# Patient Record
Sex: FEMALE | Race: WHITE | Employment: OTHER | URBAN - METROPOLITAN AREA
[De-identification: names, ages, dates, MRNs, and addresses within clinical notes are randomized per-mention and may not be internally consistent; named-entity substitution may affect disease eponyms.]

---

## 2018-04-17 ENCOUNTER — HOSPITAL ENCOUNTER (OUTPATIENT)
Dept: GENERAL RADIOLOGY | Age: 73
Discharge: HOME | End: 2018-04-19
Payer: MEDICARE

## 2018-04-17 ENCOUNTER — HOSPITAL ENCOUNTER (OUTPATIENT)
Age: 73
End: 2018-04-17
Payer: MEDICARE

## 2018-04-17 DIAGNOSIS — R06.00 DYSPNEA, UNSPECIFIED TYPE: ICD-10-CM

## 2018-04-17 PROCEDURE — 71046 X-RAY EXAM CHEST 2 VIEWS: CPT

## 2018-11-09 ENCOUNTER — TELEPHONE (OUTPATIENT)
Dept: ADMINISTRATIVE | Age: 73
End: 2018-11-09

## 2019-01-14 ENCOUNTER — OFFICE VISIT (OUTPATIENT)
Dept: CARDIOLOGY CLINIC | Age: 74
End: 2019-01-14
Payer: MEDICARE

## 2019-01-14 VITALS
BODY MASS INDEX: 46.44 KG/M2 | SYSTOLIC BLOOD PRESSURE: 130 MMHG | HEIGHT: 64 IN | RESPIRATION RATE: 20 BRPM | WEIGHT: 272 LBS | DIASTOLIC BLOOD PRESSURE: 90 MMHG | HEART RATE: 82 BPM

## 2019-01-14 DIAGNOSIS — I50.30 (HFPEF) HEART FAILURE WITH PRESERVED EJECTION FRACTION (HCC): ICD-10-CM

## 2019-01-14 DIAGNOSIS — Z82.49 FAMILY HISTORY OF HEART DISEASE: ICD-10-CM

## 2019-01-14 DIAGNOSIS — I10 ESSENTIAL HYPERTENSION: Primary | ICD-10-CM

## 2019-01-14 DIAGNOSIS — I35.0 AORTIC VALVE STENOSIS, ETIOLOGY OF CARDIAC VALVE DISEASE UNSPECIFIED: ICD-10-CM

## 2019-01-14 DIAGNOSIS — E03.9 ACQUIRED HYPOTHYROIDISM: ICD-10-CM

## 2019-01-14 DIAGNOSIS — I35.0 NONRHEUMATIC AORTIC VALVE STENOSIS: ICD-10-CM

## 2019-01-14 DIAGNOSIS — E78.5 HYPERLIPIDEMIA, UNSPECIFIED HYPERLIPIDEMIA TYPE: ICD-10-CM

## 2019-01-14 DIAGNOSIS — Z82.49 FAMILY HISTORY OF CORONARY ARTERY DISEASE: ICD-10-CM

## 2019-01-14 PROCEDURE — 93000 ELECTROCARDIOGRAM COMPLETE: CPT | Performed by: INTERNAL MEDICINE

## 2019-01-14 PROCEDURE — 99214 OFFICE O/P EST MOD 30 MIN: CPT | Performed by: INTERNAL MEDICINE

## 2019-01-14 RX ORDER — MAGNESIUM GLUCONATE 30 MG(550)
TABLET ORAL DAILY
COMMUNITY

## 2019-01-14 RX ORDER — FUROSEMIDE 20 MG/1
20 TABLET ORAL DAILY
Qty: 30 TABLET | Refills: 11 | Status: SHIPPED | OUTPATIENT
Start: 2019-01-14 | End: 2020-01-28

## 2019-01-14 RX ORDER — LOSARTAN POTASSIUM AND HYDROCHLOROTHIAZIDE 25; 100 MG/1; MG/1
1 TABLET ORAL DAILY
Refills: 1 | COMMUNITY
Start: 2018-10-30

## 2019-01-23 ENCOUNTER — HOSPITAL ENCOUNTER (OUTPATIENT)
Dept: CARDIOLOGY | Age: 74
Discharge: HOME | End: 2019-01-23
Payer: MEDICARE

## 2019-01-23 DIAGNOSIS — I50.30 (HFPEF) HEART FAILURE WITH PRESERVED EJECTION FRACTION (HCC): ICD-10-CM

## 2019-01-23 DIAGNOSIS — I35.0 NONRHEUMATIC AORTIC VALVE STENOSIS: ICD-10-CM

## 2019-01-23 LAB
LV EF: 65 %
LVEF MODALITY: NORMAL

## 2019-01-23 PROCEDURE — 93306 TTE W/DOPPLER COMPLETE: CPT | Performed by: PSYCHIATRY & NEUROLOGY

## 2019-01-25 ENCOUNTER — TELEPHONE (OUTPATIENT)
Dept: CARDIOLOGY CLINIC | Age: 74
End: 2019-01-25

## 2019-04-14 ENCOUNTER — HOSPITAL ENCOUNTER (EMERGENCY)
Age: 74
Discharge: HOME | End: 2019-04-14
Attending: EMERGENCY MEDICINE
Payer: MEDICARE

## 2019-04-14 VITALS
TEMPERATURE: 98.4 F | HEIGHT: 64 IN | HEART RATE: 70 BPM | OXYGEN SATURATION: 100 % | DIASTOLIC BLOOD PRESSURE: 63 MMHG | RESPIRATION RATE: 16 BRPM | SYSTOLIC BLOOD PRESSURE: 114 MMHG | WEIGHT: 282 LBS | BODY MASS INDEX: 48.14 KG/M2

## 2019-04-14 DIAGNOSIS — R04.0 EPISTAXIS: Primary | ICD-10-CM

## 2019-04-14 LAB
ANION GAP SERPL CALCULATED.3IONS-SCNC: 7 MMOL/L (ref 7–16)
APTT: 32.3 SEC (ref 24.5–35.1)
BUN BLDV-MCNC: 18 MG/DL (ref 8–23)
CALCIUM SERPL-MCNC: 9.2 MG/DL (ref 8.6–10.2)
CHLORIDE BLD-SCNC: 94 MMOL/L (ref 98–107)
CO2: 37 MMOL/L (ref 22–29)
CREAT SERPL-MCNC: 0.9 MG/DL (ref 0.5–1)
GFR AFRICAN AMERICAN: >60
GFR NON-AFRICAN AMERICAN: >60 ML/MIN/1.73
GLUCOSE BLD-MCNC: 129 MG/DL (ref 74–99)
HCT VFR BLD CALC: 45.1 % (ref 34–48)
HEMOGLOBIN: 13.9 G/DL (ref 11.5–15.5)
INR BLD: 1
MCH RBC QN AUTO: 27.7 PG (ref 26–35)
MCHC RBC AUTO-ENTMCNC: 30.8 % (ref 32–34.5)
MCV RBC AUTO: 90 FL (ref 80–99.9)
PDW BLD-RTO: 15.9 FL (ref 11.5–15)
PLATELET # BLD: 178 E9/L (ref 130–450)
PMV BLD AUTO: 10.6 FL (ref 7–12)
POTASSIUM REFLEX MAGNESIUM: 4.1 MMOL/L (ref 3.5–5)
PROTHROMBIN TIME: 11.6 SEC (ref 9.3–12.4)
RBC # BLD: 5.01 E12/L (ref 3.5–5.5)
SODIUM BLD-SCNC: 138 MMOL/L (ref 132–146)
WBC # BLD: 7.1 E9/L (ref 4.5–11.5)

## 2019-04-14 PROCEDURE — 30901 CONTROL OF NOSEBLEED: CPT

## 2019-04-14 PROCEDURE — 85027 COMPLETE CBC AUTOMATED: CPT

## 2019-04-14 PROCEDURE — 85730 THROMBOPLASTIN TIME PARTIAL: CPT

## 2019-04-14 PROCEDURE — 85610 PROTHROMBIN TIME: CPT

## 2019-04-14 PROCEDURE — 80048 BASIC METABOLIC PNL TOTAL CA: CPT

## 2019-04-14 PROCEDURE — 36415 COLL VENOUS BLD VENIPUNCTURE: CPT

## 2019-04-14 PROCEDURE — 99283 EMERGENCY DEPT VISIT LOW MDM: CPT

## 2019-04-14 ASSESSMENT — ENCOUNTER SYMPTOMS
EYE PAIN: 0
ABDOMINAL PAIN: 0
WHEEZING: 0
EYE REDNESS: 0
SINUS PRESSURE: 0
COUGH: 0
RHINORRHEA: 0
DIARRHEA: 0
SHORTNESS OF BREATH: 0
NAUSEA: 0
COLOR CHANGE: 0
SORE THROAT: 0
VOMITING: 0

## 2019-04-14 NOTE — ED PROVIDER NOTES
headaches. Hematological: Negative for adenopathy. Does not bruise/bleed easily. Physical Exam   Constitutional: She is oriented to person, place, and time. She appears well-developed and well-nourished. No distress. HENT:   Head: Normocephalic and atraumatic. Nose: No nasal septal hematoma. Epistaxis is observed. Mouth/Throat: Oropharynx is clear and moist. No oropharyngeal exudate. Small amount of dried dark red blood with a small clot between the turbinates and septum, no active bleeding; no posterior oropharynx blood   Eyes: Pupils are equal, round, and reactive to light. Conjunctivae and EOM are normal. Right eye exhibits no discharge. Left eye exhibits no discharge. No scleral icterus. Neck: Normal range of motion. Neck supple. No JVD present. No tracheal deviation present. No thyromegaly present. Cardiovascular: Normal rate, regular rhythm and intact distal pulses. Exam reveals no gallop and no friction rub. Murmur (systolic) heard. Pulmonary/Chest: Effort normal and breath sounds normal. No accessory muscle usage. No respiratory distress. She has no wheezes. She has no rhonchi. She has no rales. She exhibits no tenderness and no crepitus. Abdominal: Soft. Bowel sounds are normal. She exhibits no distension. There is no tenderness. There is no rebound and no guarding. Musculoskeletal: Normal range of motion. She exhibits no edema, tenderness or deformity. Neurological: She is alert and oriented to person, place, and time. No cranial nerve deficit. She exhibits normal muscle tone. Coordination normal.   Skin: Skin is warm and dry. No rash noted. She is not diaphoretic. No erythema. No pallor. Nursing note and vitals reviewed.       Procedures  --------------------------------------------------------- PROCEDURE NOTE ---------------------------------------------------------  NASAL  CAUTERY:    Unless otherwise indicated, this procedure was done or directly supervised by the ED attending. Indication:   Right anterior epistaxis. Risks, benefits and alternatives (for applicable procedures below) described. Informed consent obtained: The patient provided verbal consent for this procedure. .    Procedure:  Right nares cauterized with silver nitrate. .    Patient tolerated the procedure well    -----------------------------------------------------------------------------------------------------------------------------------------------------    [facility]    ED Course as of Apr 14 2030   Sun Apr 14, 2019   1215 Scabbed, clotted area of the right septum was cauterized with silver nitrate. No bleeding or bruising afterwards. Patient tolerated this well. She is ready and eager for discharge. [JL]      ED Course User Index  [JL] Dung Kruse DO           --------------------------------------------- PAST HISTORY ---------------------------------------------  Past Medical History:  has a past medical history of Dyspnea on exertion, Family history of heart disease, Hyperlipidemia, Hypertension, and Mild aortic stenosis. Past Surgical History:  has a past surgical history that includes Tubal ligation. Social History:  reports that she quit smoking about 54 years ago. Her smoking use included cigarettes. She has a 0.30 pack-year smoking history. She has never used smokeless tobacco. She reports that she does not drink alcohol. Family History: family history includes Heart Attack in her brother. The patients home medications have been reviewed. Allergies: Patient has no known allergies.     -------------------------------------------------- RESULTS -------------------------------------------------  Labs:  Results for orders placed or performed during the hospital encounter of 04/14/19   Protime-INR   Result Value Ref Range    Protime 11.6 9.3 - 12.4 sec    INR 1.0    APTT   Result Value Ref Range    aPTT 32.3 24.5 - 35.1 sec   CBC   Result Value Ref Range    WBC 7.1 4.5 - 11.5 E9/L RBC 5.01 3.50 - 5.50 E12/L    Hemoglobin 13.9 11.5 - 15.5 g/dL    Hematocrit 45.1 34.0 - 48.0 %    MCV 90.0 80.0 - 99.9 fL    MCH 27.7 26.0 - 35.0 pg    MCHC 30.8 (L) 32.0 - 34.5 %    RDW 15.9 (H) 11.5 - 15.0 fL    Platelets 598 211 - 391 E9/L    MPV 10.6 7.0 - 12.0 fL   Basic Metabolic Panel w/ Reflex to MG   Result Value Ref Range    Sodium 138 132 - 146 mmol/L    Potassium reflex Magnesium 4.1 3.5 - 5.0 mmol/L    Chloride 94 (L) 98 - 107 mmol/L    CO2 37 (H) 22 - 29 mmol/L    Anion Gap 7 7 - 16 mmol/L    Glucose 129 (H) 74 - 99 mg/dL    BUN 18 8 - 23 mg/dL    CREATININE 0.9 0.5 - 1.0 mg/dL    GFR Non-African American >60 >=60 mL/min/1.73    GFR African American >60     Calcium 9.2 8.6 - 10.2 mg/dL       Radiology:  No orders to display       ------------------------- NURSING NOTES AND VITALS REVIEWED ---------------------------  Date / Time Roomed:  4/14/2019 10:35 AM  ED Bed Assignment:  05/05    The nursing notes within the ED encounter and vital signs as below have been reviewed. /63   Pulse 70   Temp 98.4 °F (36.9 °C) (Oral)   Resp 16   Ht 5' 4\" (1.626 m)   Wt 282 lb (127.9 kg)   SpO2 100%   BMI 48.41 kg/m²   Oxygen Saturation Interpretation: Normal      ------------------------------------------ PROGRESS NOTES ------------------------------------------  11:01 AM  I have spoken with the patient and discussed todays results, in addition to providing specific details for the plan of care and counseling regarding the diagnosis and prognosis. Their questions are answered at this time and they are agreeable with the plan. I discussed at length with them reasons for immediate return here for re evaluation.  They will followup with their primary care physician by calling their office on Monday.      --------------------------------- ADDITIONAL PROVIDER NOTES ---------------------------------  At this time the patient is without objective evidence of an acute process requiring hospitalization or inpatient management. They have remained hemodynamically stable throughout their entire ED visit and are stable for discharge with outpatient follow-up. The plan has been discussed in detail and they are aware of the specific conditions for emergent return, as well as the importance of follow-up. Discharge Medication List as of 4/14/2019 12:14 PM          Diagnosis:  1. Epistaxis        Disposition:  Patient's disposition: Discharge to home  Patient's condition is stable.             Memo Galeano, DO  Resident  04/14/19 2030

## 2019-04-14 NOTE — ED NOTES
Bed: 05  Expected date:   Expected time:   Means of arrival:   Comments:  Jocelin Peña RN  04/14/19 4523

## 2019-07-31 ENCOUNTER — OFFICE VISIT (OUTPATIENT)
Dept: CARDIOLOGY CLINIC | Age: 74
End: 2019-07-31
Payer: MEDICARE

## 2019-07-31 VITALS
WEIGHT: 290.3 LBS | SYSTOLIC BLOOD PRESSURE: 118 MMHG | BODY MASS INDEX: 49.56 KG/M2 | HEIGHT: 64 IN | DIASTOLIC BLOOD PRESSURE: 68 MMHG | HEART RATE: 84 BPM | RESPIRATION RATE: 16 BRPM

## 2019-07-31 DIAGNOSIS — I50.32 CHRONIC HEART FAILURE WITH PRESERVED EJECTION FRACTION (HCC): Primary | ICD-10-CM

## 2019-07-31 DIAGNOSIS — Z82.49 FAMILY HISTORY OF HEART DISEASE: ICD-10-CM

## 2019-07-31 DIAGNOSIS — I10 ESSENTIAL HYPERTENSION: ICD-10-CM

## 2019-07-31 DIAGNOSIS — I35.0 NONRHEUMATIC AORTIC VALVE STENOSIS: ICD-10-CM

## 2019-07-31 PROCEDURE — 93000 ELECTROCARDIOGRAM COMPLETE: CPT | Performed by: INTERNAL MEDICINE

## 2019-07-31 PROCEDURE — 99214 OFFICE O/P EST MOD 30 MIN: CPT | Performed by: INTERNAL MEDICINE

## 2019-07-31 NOTE — PROGRESS NOTES
on file    Sexual activity: Not on file   Lifestyle    Physical activity:     Days per week: Not on file     Minutes per session: Not on file    Stress: Not on file   Relationships    Social connections:     Talks on phone: Not on file     Gets together: Not on file     Attends [employer] service: Not on file     Active member of club or organization: Not on file     Attends meetings of clubs or organizations: Not on file     Relationship status: Not on file    Intimate partner violence:     Fear of current or ex partner: Not on file     Emotionally abused: Not on file     Physically abused: Not on file     Forced sexual activity: Not on file   Other Topics Concern    Not on file   Social History Narrative    Not on file       Allergies:  No Known Allergies    Current Medications:  Current Outpatient Medications   Medication Sig Dispense Refill    losartan-hydrochlorothiazide (HYZAAR) 100-25 MG per tablet Take 1 tablet by mouth daily   1    Cholecalciferol (VITAMIN D3) 5000 units TABS Take by mouth daily      Potassium Gluconate 595 (99 K) MG TABS Take by mouth daily      furosemide (LASIX) 20 MG tablet Take 1 tablet by mouth daily 30 tablet 11    montelukast (SINGULAIR) 10 MG tablet Take 10 mg by mouth nightly.  Multiple Vitamins-Minerals (VISION VITAMINS PO) Take  by mouth daily.  Flaxseed, Linseed, (EQL FLAX SEED OIL) 1000 MG CAPS Take 1,300 mg by mouth daily       Omega-3 Fatty Acids (OMEGA 3 PO) Take 1,000 mg by mouth daily.  albuterol (PROAIR HFA) 108 (90 BASE) MCG/ACT inhaler Inhale 1-2 puffs into the lungs every 6 hours as needed.  levothyroxine (SYNTHROID) 150 MCG tablet Take 150 mcg by mouth Daily. No current facility-administered medications for this visit.       Physical Exam:  /68   Pulse 84   Resp 16   Ht 5' 4\" (1.626 m)   Wt 290 lb 4.8 oz (131.7 kg)   BMI 49.83 kg/m²   Wt Readings from Last 3 Encounters:   07/31/19 290 lb 4.8 oz (131.7 kg)   04/14/19 282 lb (127.9 kg)   01/14/19 272 lb (123.4 kg)     Appearance: Awake, no acute respiratory distress  Skin: Intact, no rash  Head: Normocephalic, atraumatic  Eyes: EOMI, no conjunctival erythema  ENMT: No pharyngeal erythema, MMM, no rhinorrhea  Neck: Supple, no elevated JVP, no carotid bruits  Lungs: Decreased BS B/L, no wheezing  Cardiac: Regular rate and rhythm, +H8N6, 2/6 systolic murmur > RUSB  Abdomen: Soft, nontender, +bowel sounds  Extremities: Moves all extremities x 4, +LE edema  Neurologic: No focal motor deficits apparent, normal mood and affect    Cardiac Tests:  ECG: Sinus rhythm, poor R wave progression    ** Lexiscan nuclear stress test on 5/10/13 demonstrated no evidence of stress induced ischemia, EF 74%    ** Echocardiogram on 5/10/13 demonstrated normal LV and RV function (EF 65%), moderate LVH, stage 1 diastolic dysfunction, and mild AS    Echocardiogram: 1/23/19 (Dr. Oneyda Beyer)   Normal left ventricle size and systolic function. Normal right ventricle structure and function.   Mild aortic stenosis. ASSESSMENT / PLAN:  1. Long-standing exertional dyspnea on exertion  2. LE lymphedema  3. HTN - /68  4. DM  5. HLD  6. Hypothyroidism  7. Diastolic dysfunction  8. [facility] of premature CAD  9.  Mild aortic stenosis (5/2013 and 1/2019)    - Results of 1/2019 echocardiogram reviewed with the patient today / serial echocardiograms  - Lasix resumed in 1/2019 (no significant improvement in LE edema) / she defers referral to lymphedema clinic  - Continue ARB    Alyssa Wu MD  [facility]) Cardiology

## 2020-01-29 RX ORDER — FUROSEMIDE 20 MG/1
TABLET ORAL
Qty: 30 TABLET | Refills: 11 | Status: SHIPPED | OUTPATIENT
Start: 2020-01-29

## 2024-12-05 ENCOUNTER — HOSPITAL ENCOUNTER (OUTPATIENT)
Dept: GENERAL RADIOLOGY | Age: 79
Discharge: HOME | End: 2024-12-07
Payer: MEDICARE

## 2024-12-05 ENCOUNTER — HOSPITAL ENCOUNTER (OUTPATIENT)
Age: 79
Discharge: HOME | End: 2024-12-07
Payer: MEDICARE

## 2024-12-05 DIAGNOSIS — R06.02 SHORTNESS OF BREATH: ICD-10-CM

## 2024-12-05 PROCEDURE — 71046 X-RAY EXAM CHEST 2 VIEWS: CPT

## 2025-01-17 ENCOUNTER — HOSPITAL ENCOUNTER (OUTPATIENT)
Age: 80
Discharge: HOME | End: 2025-01-19
Payer: MEDICARE

## 2025-01-17 ENCOUNTER — HOSPITAL ENCOUNTER (OUTPATIENT)
Dept: ULTRASOUND IMAGING | Age: 80
Discharge: HOME | End: 2025-01-19
Payer: MEDICARE

## 2025-01-17 DIAGNOSIS — N18.32 CHRONIC KIDNEY DISEASE (CKD) STAGE G3B/A1, MODERATELY DECREASED GLOMERULAR FILTRATION RATE (GFR) BETWEEN 30-44 ML/MIN/1.73 SQUARE METER AND ALBUMINURIA CREATININE RATIO LESS THAN 30 MG/G (HCC): ICD-10-CM

## 2025-01-17 PROCEDURE — 76770 US EXAM ABDO BACK WALL COMP: CPT

## 2025-02-19 ENCOUNTER — APPOINTMENT (OUTPATIENT)
Dept: GENERAL RADIOLOGY | Age: 80
DRG: 291 | End: 2025-02-19
Payer: MEDICARE

## 2025-02-19 ENCOUNTER — APPOINTMENT (OUTPATIENT)
Dept: CT IMAGING | Age: 80
DRG: 291 | End: 2025-02-19
Payer: MEDICARE

## 2025-02-19 ENCOUNTER — HOSPITAL ENCOUNTER (INPATIENT)
Age: 80
LOS: 12 days | Discharge: NURSING, REHAB OR LONG-TERM CARE | DRG: 291 | End: 2025-03-03
Attending: EMERGENCY MEDICINE | Admitting: INTERNAL MEDICINE
Payer: MEDICARE

## 2025-02-19 DIAGNOSIS — I50.30 (HFPEF) HEART FAILURE WITH PRESERVED EJECTION FRACTION (HCC): ICD-10-CM

## 2025-02-19 DIAGNOSIS — N17.9 AKI (ACUTE KIDNEY INJURY): ICD-10-CM

## 2025-02-19 DIAGNOSIS — R06.02 SHORTNESS OF BREATH: ICD-10-CM

## 2025-02-19 DIAGNOSIS — R60.0 LEG EDEMA: ICD-10-CM

## 2025-02-19 DIAGNOSIS — J96.01 ACUTE RESPIRATORY FAILURE WITH HYPOXIA (HCC): ICD-10-CM

## 2025-02-19 DIAGNOSIS — I50.9 CONGESTIVE HEART FAILURE, UNSPECIFIED HF CHRONICITY, UNSPECIFIED HEART FAILURE TYPE (HCC): Primary | ICD-10-CM

## 2025-02-19 DIAGNOSIS — I48.0 PAROXYSMAL ATRIAL FIBRILLATION (HCC): ICD-10-CM

## 2025-02-19 DIAGNOSIS — E87.70 HYPERVOLEMIA, UNSPECIFIED HYPERVOLEMIA TYPE: ICD-10-CM

## 2025-02-19 DIAGNOSIS — I35.0 NONRHEUMATIC AORTIC VALVE STENOSIS: ICD-10-CM

## 2025-02-19 DIAGNOSIS — M79.89 SWELLING OF BOTH LOWER EXTREMITIES: ICD-10-CM

## 2025-02-19 LAB
ALBUMIN SERPL-MCNC: 4.3 G/DL (ref 3.5–5.2)
ALP SERPL-CCNC: 78 U/L (ref 35–104)
ALT SERPL-CCNC: 17 U/L (ref 0–32)
ANION GAP SERPL CALCULATED.3IONS-SCNC: 10 MMOL/L (ref 7–16)
AST SERPL-CCNC: 15 U/L (ref 0–31)
BASOPHILS # BLD: 0.07 K/UL (ref 0–0.2)
BASOPHILS NFR BLD: 1 % (ref 0–2)
BILIRUB SERPL-MCNC: 0.5 MG/DL (ref 0–1.2)
BNP SERPL-MCNC: 4086 PG/ML (ref 0–450)
BUN SERPL-MCNC: 55 MG/DL (ref 6–23)
CALCIUM SERPL-MCNC: 9.2 MG/DL (ref 8.6–10.2)
CHLORIDE SERPL-SCNC: 96 MMOL/L (ref 98–107)
CO2 SERPL-SCNC: 34 MMOL/L (ref 22–29)
CREAT SERPL-MCNC: 1.8 MG/DL (ref 0.5–1)
EOSINOPHIL # BLD: 0.13 K/UL (ref 0.05–0.5)
EOSINOPHILS RELATIVE PERCENT: 2 % (ref 0–6)
ERYTHROCYTE [DISTWIDTH] IN BLOOD BY AUTOMATED COUNT: 17.8 % (ref 11.5–15)
GFR, ESTIMATED: 28 ML/MIN/1.73M2
GLUCOSE SERPL-MCNC: 105 MG/DL (ref 74–99)
HCT VFR BLD AUTO: 52.6 % (ref 34–48)
HGB BLD-MCNC: 15.3 G/DL (ref 11.5–15.5)
IMM GRANULOCYTES # BLD AUTO: 0.04 K/UL (ref 0–0.58)
IMM GRANULOCYTES NFR BLD: 1 % (ref 0–5)
LACTATE BLDV-SCNC: 2.3 MMOL/L (ref 0.5–2.2)
LYMPHOCYTES NFR BLD: 1.07 K/UL (ref 1.5–4)
LYMPHOCYTES RELATIVE PERCENT: 13 % (ref 20–42)
MCH RBC QN AUTO: 27.5 PG (ref 26–35)
MCHC RBC AUTO-ENTMCNC: 29.1 G/DL (ref 32–34.5)
MCV RBC AUTO: 94.4 FL (ref 80–99.9)
MONOCYTES NFR BLD: 0.59 K/UL (ref 0.1–0.95)
MONOCYTES NFR BLD: 7 % (ref 2–12)
NEUTROPHILS NFR BLD: 77 % (ref 43–80)
NEUTS SEG NFR BLD: 6.2 K/UL (ref 1.8–7.3)
PLATELET # BLD AUTO: 197 K/UL (ref 130–450)
PMV BLD AUTO: 11.6 FL (ref 7–12)
POTASSIUM SERPL-SCNC: 4.6 MMOL/L (ref 3.5–5)
PROT SERPL-MCNC: 7.2 G/DL (ref 6.4–8.3)
RBC # BLD AUTO: 5.57 M/UL (ref 3.5–5.5)
SODIUM SERPL-SCNC: 140 MMOL/L (ref 132–146)
TROPONIN I SERPL HS-MCNC: 34 NG/L (ref 0–9)
WBC OTHER # BLD: 8.1 K/UL (ref 4.5–11.5)

## 2025-02-19 PROCEDURE — 83880 ASSAY OF NATRIURETIC PEPTIDE: CPT

## 2025-02-19 PROCEDURE — 84484 ASSAY OF TROPONIN QUANT: CPT

## 2025-02-19 PROCEDURE — 71275 CT ANGIOGRAPHY CHEST: CPT

## 2025-02-19 PROCEDURE — 85025 COMPLETE CBC W/AUTO DIFF WBC: CPT

## 2025-02-19 PROCEDURE — 6360000002 HC RX W HCPCS: Performed by: EMERGENCY MEDICINE

## 2025-02-19 PROCEDURE — 99285 EMERGENCY DEPT VISIT HI MDM: CPT

## 2025-02-19 PROCEDURE — 80053 COMPREHEN METABOLIC PANEL: CPT

## 2025-02-19 PROCEDURE — 2060000000 HC ICU INTERMEDIATE R&B

## 2025-02-19 PROCEDURE — 71045 X-RAY EXAM CHEST 1 VIEW: CPT

## 2025-02-19 PROCEDURE — 6360000004 HC RX CONTRAST MEDICATION: Performed by: RADIOLOGY

## 2025-02-19 PROCEDURE — 83605 ASSAY OF LACTIC ACID: CPT

## 2025-02-19 PROCEDURE — 93005 ELECTROCARDIOGRAM TRACING: CPT | Performed by: EMERGENCY MEDICINE

## 2025-02-19 RX ORDER — IOPAMIDOL 755 MG/ML
75 INJECTION, SOLUTION INTRAVASCULAR
Status: COMPLETED | OUTPATIENT
Start: 2025-02-19 | End: 2025-02-19

## 2025-02-19 RX ORDER — BUMETANIDE 0.25 MG/ML
1 INJECTION, SOLUTION INTRAMUSCULAR; INTRAVENOUS ONCE
Status: COMPLETED | OUTPATIENT
Start: 2025-02-19 | End: 2025-02-19

## 2025-02-19 RX ADMIN — BUMETANIDE 1 MG: 0.25 INJECTION INTRAMUSCULAR; INTRAVENOUS at 22:38

## 2025-02-19 RX ADMIN — IOPAMIDOL 75 ML: 755 INJECTION, SOLUTION INTRAVENOUS at 21:01

## 2025-02-19 ASSESSMENT — LIFESTYLE VARIABLES
HOW OFTEN DO YOU HAVE A DRINK CONTAINING ALCOHOL: NEVER
HOW MANY STANDARD DRINKS CONTAINING ALCOHOL DO YOU HAVE ON A TYPICAL DAY: PATIENT DOES NOT DRINK

## 2025-02-19 NOTE — ED NOTES
Name: Aggie Carter  : 1945  MRN: [id number]    Date: 2025    Benefits of immediately proceeding with Radiology exam outweigh the risks and therefore the following is being waived:      [] Pregnancy test    [] Protocol for Iodine allergy    [] MRI questionnaire    [x] BUN/Creatinine        DO Edmond Cueto Charles, DO  25 0713

## 2025-02-19 NOTE — ED NOTES
Department of Emergency Medicine  FIRST PROVIDER TRIAGE NOTE             Independent MLP           2/19/25  2:04 PM EST    Date of Encounter: 2/19/25   MRN: [id number]      HPI: Aggie Carter is a 79 y.o. female who presents to the ED for Irregular Heart Beat (Patient sent by Dr. Berry's office to be evaluated for A-fib, heart block and 21 lb. Weight gain in the past month. ), Weight Gain, and Shortness of Breath     CYANOTIC DURING TRIAGE, SPO2 62% ON ROOM AIR.  PATIENT DOES NOT WEAR O2 AT BASELINE.     ROS: Negative for abd pain or back pain.    PE: Gen Appearance/Constitutional: alert  HEENT: NC/NT. PERRLA,  Airway patent.    Provider-Patient relationship only established for Provider In Triage (PIT)  Full assessment, HPI and examination not performed, therefore, it is not yet possible to state whether or not an emergency medical condition exists   Focused assessment only during triage. This is not a comprehensive evaluation due to no physical ER space, staff shortage and high numbers of boarding patients in the ER.       Initial Plan of Care: All treatment areas with department are currently occupied. Plan to order/Initiate the following while awaiting opening in ED.  Initiate Treatment-Testing, Proceed toTreatment Area When Bed Available for ED Attending/MLP to Continue Care    Electronically signed by SARA Amado CNP   DD: 2/19/25      Seth Linares APRN - CNP  02/19/25 8320

## 2025-02-19 NOTE — ED PROVIDER NOTES
79-year-old female presents to the emergency department for concern for A-fib and concern for fluid overload the patient has gained 21 pounds over the last month possibly due to her not using her diuretics appropriately patient family reports she was told by her doctor a month ago to cut back on her diuretics but apparently she stopped using all diuretics they report she is now having increasing shortness of breath cough congestion or other complaints.  Nursing staff reporting patient is hypoxic    The history is provided by the patient.   Shortness of Breath  Severity:  Moderate  Onset quality:  Gradual  Duration:  1 week  Timing:  Intermittent  Progression:  Waxing and waning  Chronicity:  New  Relieved by:  Nothing  Worsened by:  Nothing  Ineffective treatments:  None tried  Associated symptoms: no chest pain, no cough, no ear pain, no fever, no headaches, no rash, no sore throat, no vomiting and no wheezing         Review of Systems   Constitutional:  Positive for unexpected weight change. Negative for chills and fever.   HENT:  Negative for ear pain, sinus pressure and sore throat.    Eyes:  Negative for pain, discharge and redness.   Respiratory:  Positive for shortness of breath. Negative for cough and wheezing.    Cardiovascular:  Positive for leg swelling. Negative for chest pain.   Gastrointestinal:  Negative for abdominal distention, diarrhea, nausea and vomiting.   Genitourinary:  Negative for dysuria and frequency.   Musculoskeletal:  Negative for arthralgias and back pain.   Skin:  Negative for rash and wound.   Neurological:  Negative for weakness and headaches.   Hematological:  Negative for adenopathy.   All other systems reviewed and are negative.       Physical Exam  Constitutional:       Appearance: She is well-developed.   HENT:      Head: Normocephalic and atraumatic.   Eyes:      Extraocular Movements: Extraocular movements intact.      Pupils: Pupils are equal, round, and reactive to

## 2025-02-20 ENCOUNTER — APPOINTMENT (OUTPATIENT)
Age: 80
DRG: 291 | End: 2025-02-20
Payer: MEDICARE

## 2025-02-20 PROBLEM — I48.91 ATRIAL FIBRILLATION, NEW ONSET (HCC): Status: ACTIVE | Noted: 2025-02-20

## 2025-02-20 PROBLEM — E87.70 VOLUME OVERLOAD: Status: ACTIVE | Noted: 2025-02-20

## 2025-02-20 PROBLEM — I35.0 NONRHEUMATIC AORTIC VALVE STENOSIS: Status: ACTIVE | Noted: 2025-02-20

## 2025-02-20 PROBLEM — N17.9 AKI (ACUTE KIDNEY INJURY): Status: ACTIVE | Noted: 2025-02-20

## 2025-02-20 LAB
ANION GAP SERPL CALCULATED.3IONS-SCNC: 10 MMOL/L (ref 7–16)
BASOPHILS # BLD: 0.06 K/UL (ref 0–0.2)
BASOPHILS NFR BLD: 1 % (ref 0–2)
BUN SERPL-MCNC: 50 MG/DL (ref 6–23)
CALCIUM SERPL-MCNC: 9 MG/DL (ref 8.6–10.2)
CHLORIDE SERPL-SCNC: 100 MMOL/L (ref 98–107)
CHOLEST SERPL-MCNC: 151 MG/DL
CO2 SERPL-SCNC: 32 MMOL/L (ref 22–29)
CREAT SERPL-MCNC: 1.7 MG/DL (ref 0.5–1)
ECHO AO ASC DIAM: 3.8 CM
ECHO AO ASCENDING AORTA INDEX: 1.66 CM/M2
ECHO AO SINUS VALSALVA DIAM: 3.2 CM
ECHO AO SINUS VALSALVA INDEX: 1.4 CM/M2
ECHO AO ST JNCT DIAM: 3 CM
ECHO AV AREA PEAK VELOCITY: 1.2 CM2
ECHO AV AREA VTI: 1.2 CM2
ECHO AV AREA/BSA PEAK VELOCITY: 0.5 CM2/M2
ECHO AV AREA/BSA VTI: 0.5 CM2/M2
ECHO AV CUSP MM: 1.4 CM
ECHO AV MEAN GRADIENT: 14 MMHG
ECHO AV MEAN VELOCITY: 1.8 M/S
ECHO AV PEAK GRADIENT: 22 MMHG
ECHO AV PEAK VELOCITY: 2.3 M/S
ECHO AV VELOCITY RATIO: 0.3
ECHO AV VTI: 49.1 CM
ECHO BSA: 2.43 M2
ECHO EST RA PRESSURE: 3 MMHG
ECHO LA DIAMETER INDEX: 1.75 CM/M2
ECHO LA DIAMETER: 4 CM
ECHO LA VOL A-L A2C: 69 ML (ref 22–52)
ECHO LA VOL A-L A4C: 44 ML (ref 22–52)
ECHO LA VOL MOD A2C: 64 ML (ref 22–52)
ECHO LA VOL MOD A4C: 42 ML (ref 22–52)
ECHO LA VOLUME AREA LENGTH: 58 ML
ECHO LA VOLUME INDEX A-L A2C: 30 ML/M2 (ref 16–34)
ECHO LA VOLUME INDEX A-L A4C: 19 ML/M2 (ref 16–34)
ECHO LA VOLUME INDEX AREA LENGTH: 25 ML/M2 (ref 16–34)
ECHO LA VOLUME INDEX MOD A2C: 28 ML/M2 (ref 16–34)
ECHO LA VOLUME INDEX MOD A4C: 18 ML/M2 (ref 16–34)
ECHO LV EDV A2C: 100 ML
ECHO LV EDV A4C: 81 ML
ECHO LV EDV BP: 91 ML (ref 56–104)
ECHO LV EDV INDEX A4C: 35 ML/M2
ECHO LV EDV INDEX BP: 40 ML/M2
ECHO LV EDV NDEX A2C: 44 ML/M2
ECHO LV EF PHYSICIAN: 60 %
ECHO LV EJECTION FRACTION A2C: 63 %
ECHO LV EJECTION FRACTION A4C: 61 %
ECHO LV EJECTION FRACTION BIPLANE: 61 % (ref 55–100)
ECHO LV ESV A2C: 37 ML
ECHO LV ESV A4C: 31 ML
ECHO LV ESV BP: 35 ML (ref 19–49)
ECHO LV ESV INDEX A2C: 16 ML/M2
ECHO LV ESV INDEX A4C: 14 ML/M2
ECHO LV ESV INDEX BP: 15 ML/M2
ECHO LV FRACTIONAL SHORTENING: 31 % (ref 28–44)
ECHO LV INTERNAL DIMENSION DIASTOLE INDEX: 2.4 CM/M2
ECHO LV INTERNAL DIMENSION DIASTOLIC: 5.5 CM (ref 3.9–5.3)
ECHO LV INTERNAL DIMENSION SYSTOLIC INDEX: 1.66 CM/M2
ECHO LV INTERNAL DIMENSION SYSTOLIC: 3.8 CM
ECHO LV ISOVOLUMETRIC RELAXATION TIME (IVRT): 64.6 MS
ECHO LV IVSD: 1.1 CM (ref 0.6–0.9)
ECHO LV IVSS: 1.6 CM
ECHO LV MASS 2D: 272.4 G (ref 67–162)
ECHO LV MASS INDEX 2D: 118.9 G/M2 (ref 43–95)
ECHO LV POSTERIOR WALL DIASTOLIC: 1.3 CM (ref 0.6–0.9)
ECHO LV POSTERIOR WALL SYSTOLIC: 1.9 CM
ECHO LV RELATIVE WALL THICKNESS RATIO: 0.47
ECHO LVOT AREA: 3.8 CM2
ECHO LVOT AV VTI INDEX: 0.32
ECHO LVOT DIAM: 2.2 CM
ECHO LVOT MEAN GRADIENT: 1 MMHG
ECHO LVOT PEAK GRADIENT: 2 MMHG
ECHO LVOT PEAK VELOCITY: 0.7 M/S
ECHO LVOT STROKE VOLUME INDEX: 26.4 ML/M2
ECHO LVOT SV: 60.4 ML
ECHO LVOT VTI: 15.9 CM
ECHO MV AREA VTI: 4.2 CM2
ECHO MV LVOT VTI INDEX: 0.91
ECHO MV MAX VELOCITY: 1.3 M/S
ECHO MV MEAN GRADIENT: 3 MMHG
ECHO MV MEAN VELOCITY: 0.8 M/S
ECHO MV PEAK GRADIENT: 6 MMHG
ECHO MV VTI: 14.4 CM
ECHO PV MAX VELOCITY: 1 M/S
ECHO PV MEAN GRADIENT: 3 MMHG
ECHO PV MEAN VELOCITY: 0.8 M/S
ECHO PV PEAK GRADIENT: 4 MMHG
ECHO PV VTI: 22 CM
ECHO PVEIN PEAK D VELOCITY: 0.7 M/S
ECHO PVEIN PEAK S VELOCITY: 0.5 M/S
ECHO PVEIN S/D RATIO: 0.7
ECHO RIGHT VENTRICULAR SYSTOLIC PRESSURE (RVSP): 49 MMHG
ECHO RV BASAL DIMENSION: 6 CM
ECHO RV INTERNAL DIMENSION: 2.8 CM
ECHO RV MID DIMENSION: 4.4 CM
ECHO RV TAPSE: 1.7 CM (ref 1.7–?)
ECHO TV REGURGITANT MAX VELOCITY: 3.38 M/S
ECHO TV REGURGITANT PEAK GRADIENT: 46 MMHG
EKG ATRIAL RATE: 312 BPM
EKG Q-T INTERVAL: 330 MS
EKG QRS DURATION: 74 MS
EKG QTC CALCULATION (BAZETT): 444 MS
EKG R AXIS: -87 DEGREES
EKG T AXIS: 92 DEGREES
EKG VENTRICULAR RATE: 109 BPM
EOSINOPHIL # BLD: 0.13 K/UL (ref 0.05–0.5)
EOSINOPHILS RELATIVE PERCENT: 2 % (ref 0–6)
ERYTHROCYTE [DISTWIDTH] IN BLOOD BY AUTOMATED COUNT: 17.5 % (ref 11.5–15)
FERRITIN SERPL-MCNC: 43 NG/ML
GFR, ESTIMATED: 30 ML/MIN/1.73M2
GLUCOSE SERPL-MCNC: 83 MG/DL (ref 74–99)
HCT VFR BLD AUTO: 49.1 % (ref 34–48)
HDLC SERPL-MCNC: 45 MG/DL
HGB BLD-MCNC: 14 G/DL (ref 11.5–15.5)
IMM GRANULOCYTES # BLD AUTO: 0.03 K/UL (ref 0–0.58)
IMM GRANULOCYTES NFR BLD: 1 % (ref 0–5)
IRON SATN MFR SERPL: 12 % (ref 15–50)
IRON SERPL-MCNC: 46 UG/DL (ref 37–145)
LDLC SERPL CALC-MCNC: 88 MG/DL
LYMPHOCYTES NFR BLD: 0.83 K/UL (ref 1.5–4)
LYMPHOCYTES RELATIVE PERCENT: 13 % (ref 20–42)
MAGNESIUM SERPL-MCNC: 2.6 MG/DL (ref 1.6–2.6)
MCH RBC QN AUTO: 27.2 PG (ref 26–35)
MCHC RBC AUTO-ENTMCNC: 28.5 G/DL (ref 32–34.5)
MCV RBC AUTO: 95.3 FL (ref 80–99.9)
MONOCYTES NFR BLD: 0.61 K/UL (ref 0.1–0.95)
MONOCYTES NFR BLD: 9 % (ref 2–12)
NEUTROPHILS NFR BLD: 75 % (ref 43–80)
NEUTS SEG NFR BLD: 4.98 K/UL (ref 1.8–7.3)
PLATELET # BLD AUTO: 146 K/UL (ref 130–450)
PMV BLD AUTO: 10.9 FL (ref 7–12)
POTASSIUM SERPL-SCNC: 5.2 MMOL/L (ref 3.5–5)
RBC # BLD AUTO: 5.15 M/UL (ref 3.5–5.5)
RBC # BLD: ABNORMAL 10*6/UL
SODIUM SERPL-SCNC: 142 MMOL/L (ref 132–146)
T4 FREE SERPL-MCNC: 1.2 NG/DL (ref 0.9–1.7)
TIBC SERPL-MCNC: 394 UG/DL (ref 250–450)
TRIGL SERPL-MCNC: 88 MG/DL
TROPONIN I SERPL HS-MCNC: 29 NG/L (ref 0–9)
TSH SERPL DL<=0.05 MIU/L-ACNC: 4.82 UIU/ML (ref 0.27–4.2)
VLDLC SERPL CALC-MCNC: 18 MG/DL
WBC OTHER # BLD: 6.6 K/UL (ref 4.5–11.5)

## 2025-02-20 PROCEDURE — 6360000002 HC RX W HCPCS: Performed by: NURSE PRACTITIONER

## 2025-02-20 PROCEDURE — 83550 IRON BINDING TEST: CPT

## 2025-02-20 PROCEDURE — 82728 ASSAY OF FERRITIN: CPT

## 2025-02-20 PROCEDURE — 93010 ELECTROCARDIOGRAM REPORT: CPT | Performed by: INTERNAL MEDICINE

## 2025-02-20 PROCEDURE — 93306 TTE W/DOPPLER COMPLETE: CPT | Performed by: INTERNAL MEDICINE

## 2025-02-20 PROCEDURE — 85025 COMPLETE CBC W/AUTO DIFF WBC: CPT

## 2025-02-20 PROCEDURE — C8929 TTE W OR WO FOL WCON,DOPPLER: HCPCS

## 2025-02-20 PROCEDURE — 80061 LIPID PANEL: CPT

## 2025-02-20 PROCEDURE — 83540 ASSAY OF IRON: CPT

## 2025-02-20 PROCEDURE — 2500000003 HC RX 250 WO HCPCS: Performed by: NURSE PRACTITIONER

## 2025-02-20 PROCEDURE — 2060000000 HC ICU INTERMEDIATE R&B

## 2025-02-20 PROCEDURE — 2700000000 HC OXYGEN THERAPY PER DAY

## 2025-02-20 PROCEDURE — 80048 BASIC METABOLIC PNL TOTAL CA: CPT

## 2025-02-20 PROCEDURE — 84443 ASSAY THYROID STIM HORMONE: CPT

## 2025-02-20 PROCEDURE — 6360000004 HC RX CONTRAST MEDICATION: Performed by: NURSE PRACTITIONER

## 2025-02-20 PROCEDURE — 84439 ASSAY OF FREE THYROXINE: CPT

## 2025-02-20 PROCEDURE — 83735 ASSAY OF MAGNESIUM: CPT

## 2025-02-20 PROCEDURE — 6370000000 HC RX 637 (ALT 250 FOR IP): Performed by: NURSE PRACTITIONER

## 2025-02-20 PROCEDURE — 36415 COLL VENOUS BLD VENIPUNCTURE: CPT

## 2025-02-20 PROCEDURE — 84484 ASSAY OF TROPONIN QUANT: CPT

## 2025-02-20 RX ORDER — ACETAMINOPHEN 650 MG/1
650 SUPPOSITORY RECTAL EVERY 6 HOURS PRN
Status: DISCONTINUED | OUTPATIENT
Start: 2025-02-20 | End: 2025-03-04 | Stop reason: HOSPADM

## 2025-02-20 RX ORDER — ONDANSETRON 2 MG/ML
4 INJECTION INTRAMUSCULAR; INTRAVENOUS EVERY 6 HOURS PRN
Status: DISCONTINUED | OUTPATIENT
Start: 2025-02-20 | End: 2025-03-04 | Stop reason: HOSPADM

## 2025-02-20 RX ORDER — ONDANSETRON 4 MG/1
4 TABLET, ORALLY DISINTEGRATING ORAL EVERY 8 HOURS PRN
Status: DISCONTINUED | OUTPATIENT
Start: 2025-02-20 | End: 2025-03-04 | Stop reason: HOSPADM

## 2025-02-20 RX ORDER — LEVOTHYROXINE SODIUM 75 UG/1
150 TABLET ORAL DAILY
Status: DISCONTINUED | OUTPATIENT
Start: 2025-02-20 | End: 2025-03-04 | Stop reason: HOSPADM

## 2025-02-20 RX ORDER — ACETAMINOPHEN 325 MG/1
650 TABLET ORAL EVERY 6 HOURS PRN
Status: DISCONTINUED | OUTPATIENT
Start: 2025-02-20 | End: 2025-03-04 | Stop reason: HOSPADM

## 2025-02-20 RX ORDER — ALBUTEROL SULFATE 0.83 MG/ML
2.5 SOLUTION RESPIRATORY (INHALATION) EVERY 6 HOURS PRN
Status: DISCONTINUED | OUTPATIENT
Start: 2025-02-20 | End: 2025-03-04 | Stop reason: HOSPADM

## 2025-02-20 RX ORDER — ENOXAPARIN SODIUM 100 MG/ML
30 INJECTION SUBCUTANEOUS 2 TIMES DAILY
Status: DISCONTINUED | OUTPATIENT
Start: 2025-02-20 | End: 2025-02-22

## 2025-02-20 RX ORDER — POTASSIUM CHLORIDE 7.45 MG/ML
10 INJECTION INTRAVENOUS PRN
Status: DISCONTINUED | OUTPATIENT
Start: 2025-02-20 | End: 2025-03-04 | Stop reason: HOSPADM

## 2025-02-20 RX ORDER — SODIUM CHLORIDE 0.9 % (FLUSH) 0.9 %
5-40 SYRINGE (ML) INJECTION EVERY 12 HOURS SCHEDULED
Status: DISCONTINUED | OUTPATIENT
Start: 2025-02-20 | End: 2025-03-04 | Stop reason: HOSPADM

## 2025-02-20 RX ORDER — MAGNESIUM SULFATE IN WATER 40 MG/ML
2000 INJECTION, SOLUTION INTRAVENOUS PRN
Status: DISCONTINUED | OUTPATIENT
Start: 2025-02-20 | End: 2025-03-04 | Stop reason: HOSPADM

## 2025-02-20 RX ORDER — POTASSIUM CHLORIDE 1500 MG/1
40 TABLET, EXTENDED RELEASE ORAL PRN
Status: DISCONTINUED | OUTPATIENT
Start: 2025-02-20 | End: 2025-03-04 | Stop reason: HOSPADM

## 2025-02-20 RX ORDER — BUMETANIDE 0.25 MG/ML
0.5 INJECTION, SOLUTION INTRAMUSCULAR; INTRAVENOUS 2 TIMES DAILY
Status: DISCONTINUED | OUTPATIENT
Start: 2025-02-20 | End: 2025-02-21

## 2025-02-20 RX ORDER — SODIUM CHLORIDE 9 MG/ML
INJECTION, SOLUTION INTRAVENOUS PRN
Status: DISCONTINUED | OUTPATIENT
Start: 2025-02-20 | End: 2025-03-04 | Stop reason: HOSPADM

## 2025-02-20 RX ORDER — SODIUM CHLORIDE 0.9 % (FLUSH) 0.9 %
10 SYRINGE (ML) INJECTION PRN
Status: DISCONTINUED | OUTPATIENT
Start: 2025-02-20 | End: 2025-03-04 | Stop reason: HOSPADM

## 2025-02-20 RX ORDER — LOSARTAN POTASSIUM AND HYDROCHLOROTHIAZIDE 25; 100 MG/1; MG/1
1 TABLET ORAL DAILY
Status: DISCONTINUED | OUTPATIENT
Start: 2025-02-20 | End: 2025-02-22

## 2025-02-20 RX ORDER — ALBUTEROL SULFATE 90 UG/1
1 INHALANT RESPIRATORY (INHALATION) EVERY 6 HOURS PRN
Status: DISCONTINUED | OUTPATIENT
Start: 2025-02-20 | End: 2025-02-20 | Stop reason: CLARIF

## 2025-02-20 RX ADMIN — BUMETANIDE 0.5 MG: 0.25 INJECTION INTRAMUSCULAR; INTRAVENOUS at 08:56

## 2025-02-20 RX ADMIN — SODIUM CHLORIDE, PRESERVATIVE FREE 10 ML: 5 INJECTION INTRAVENOUS at 08:57

## 2025-02-20 RX ADMIN — BUMETANIDE 0.5 MG: 0.25 INJECTION INTRAMUSCULAR; INTRAVENOUS at 16:56

## 2025-02-20 RX ADMIN — LEVOTHYROXINE SODIUM 150 MCG: 75 TABLET ORAL at 06:14

## 2025-02-20 RX ADMIN — ENOXAPARIN SODIUM 30 MG: 100 INJECTION SUBCUTANEOUS at 08:57

## 2025-02-20 RX ADMIN — SODIUM CHLORIDE, PRESERVATIVE FREE 10 ML: 5 INJECTION INTRAVENOUS at 21:45

## 2025-02-20 RX ADMIN — ENOXAPARIN SODIUM 30 MG: 100 INJECTION SUBCUTANEOUS at 21:35

## 2025-02-20 RX ADMIN — SULFUR HEXAFLUORIDE 2 ML: 60.7; .19; .19 INJECTION, POWDER, LYOPHILIZED, FOR SUSPENSION INTRAVENOUS; INTRAVESICAL at 08:56

## 2025-02-20 RX ADMIN — ENOXAPARIN SODIUM 30 MG: 100 INJECTION SUBCUTANEOUS at 01:02

## 2025-02-20 NOTE — CARE COORDINATION
Advance Care Planning   Healthcare Decision Maker:    Primary Decision Maker: Lucy Chow - Step Child - [phone]    Secondary Decision Maker: DonatoJossy - Grandchild - [phone]    Click here to complete Healthcare Decision Makers including selection of the Healthcare Decision Maker Relationship (ie \"Primary\").

## 2025-02-20 NOTE — H&P
closely.  May add norvasc, BB, or dilt if needed.      DVT prophylaxis - lovenox 30 BID.  PT OT  Discharge plan - likely in 2-3 days.          Dwight Avendano MD  2/20/2025  11:48 AM

## 2025-02-20 NOTE — CARE COORDINATION
Introduced my self and provided explanation of CM role to patient.  Patient is awake, alert, and aware of current diagnosis and treatment plan including iv diuresis, cardiac monitoring, supplemental oxygen.  She voices she resides at home and completes her adl's with independence. She uses a walker w/in the home and a cane when outside her residence.  Patient is established with a pcp and denies any issue with retail pharmaceutical coverage.  She has not had recent home health care utilization and has never had snf/omar stay.  Patient and her step daughter Lucy are agreeable to initiating [facility] and after choices provided/reviewed, [facility] was selected.  Referral process initiated to Dottie at agency.  Will await her review and response regarding  availability/acceptance. HHC orders are in place.  Patient will need followed and assisted with discharge planning as necessary.   PT/OT evals ordered, remain incomplete.  Tushar Jaramillo, MSN RN  [facility] Case Management  [phone]

## 2025-02-20 NOTE — ED NOTES
ED to Inpatient Handoff Report    Notified 4W that electronic handoff available and patient ready for transport to room 415.    Safety Risks: None identified    Patient in Restraints: no    Constant Observer or Patient : no    Telemetry Monitoring Ordered: Yes     Cardiac Rhythm: Atrial fib    Order to transfer to unit without monitor: NO    Last MEWS: 1 Time completed: 2331    Deterioration Index: 33.71    Vitals:    02/19/25 1456 02/19/25 1556 02/19/25 2251 02/19/25 2330   BP: (!) 146/92 (!) 139/96 134/70 134/70   Pulse: 100 93 87 91   Resp: 22 17 16 18   Temp:    98.6 °F (37 °C)   TempSrc:    Oral   SpO2: 97% 96%  99%   Weight:       Height:           Opportunity for questions and clarification was provided.

## 2025-02-21 PROCEDURE — 2060000000 HC ICU INTERMEDIATE R&B

## 2025-02-21 PROCEDURE — 97161 PT EVAL LOW COMPLEX 20 MIN: CPT

## 2025-02-21 PROCEDURE — 97535 SELF CARE MNGMENT TRAINING: CPT

## 2025-02-21 PROCEDURE — 6370000000 HC RX 637 (ALT 250 FOR IP): Performed by: NURSE PRACTITIONER

## 2025-02-21 PROCEDURE — 2700000000 HC OXYGEN THERAPY PER DAY

## 2025-02-21 PROCEDURE — 2500000003 HC RX 250 WO HCPCS: Performed by: NURSE PRACTITIONER

## 2025-02-21 PROCEDURE — 6370000000 HC RX 637 (ALT 250 FOR IP): Performed by: INTERNAL MEDICINE

## 2025-02-21 PROCEDURE — 6360000002 HC RX W HCPCS: Performed by: NURSE PRACTITIONER

## 2025-02-21 PROCEDURE — 97165 OT EVAL LOW COMPLEX 30 MIN: CPT

## 2025-02-21 RX ORDER — FUROSEMIDE 20 MG/1
20 TABLET ORAL 2 TIMES DAILY
Status: DISCONTINUED | OUTPATIENT
Start: 2025-02-21 | End: 2025-02-22

## 2025-02-21 RX ORDER — METOPROLOL SUCCINATE 25 MG/1
25 TABLET, EXTENDED RELEASE ORAL DAILY
Status: DISCONTINUED | OUTPATIENT
Start: 2025-02-21 | End: 2025-02-22

## 2025-02-21 RX ADMIN — ENOXAPARIN SODIUM 30 MG: 100 INJECTION SUBCUTANEOUS at 08:28

## 2025-02-21 RX ADMIN — LEVOTHYROXINE SODIUM 150 MCG: 75 TABLET ORAL at 06:08

## 2025-02-21 RX ADMIN — SODIUM CHLORIDE, PRESERVATIVE FREE 10 ML: 5 INJECTION INTRAVENOUS at 20:27

## 2025-02-21 RX ADMIN — BUMETANIDE 0.5 MG: 0.25 INJECTION INTRAMUSCULAR; INTRAVENOUS at 08:27

## 2025-02-21 RX ADMIN — METOPROLOL SUCCINATE 25 MG: 25 TABLET, EXTENDED RELEASE ORAL at 12:09

## 2025-02-21 RX ADMIN — ENOXAPARIN SODIUM 30 MG: 100 INJECTION SUBCUTANEOUS at 20:30

## 2025-02-21 RX ADMIN — FUROSEMIDE 20 MG: 20 TABLET ORAL at 16:36

## 2025-02-21 RX ADMIN — SODIUM CHLORIDE, PRESERVATIVE FREE 10 ML: 5 INJECTION INTRAVENOUS at 08:28

## 2025-02-21 ASSESSMENT — PAIN SCALES - GENERAL
PAINLEVEL_OUTOF10: 0

## 2025-02-21 NOTE — ACP (ADVANCE CARE PLANNING)
Advance Care Planning   Healthcare Decision Maker:    Primary Decision Maker: Lucy Chow - Step Child - [phone]    Secondary Decision Maker: DonatoJossy - Grandchild - [phone]    Click here to complete Healthcare Decision Makers including selection of the Healthcare Decision Maker Relationship (ie \"Primary\").

## 2025-02-22 PROBLEM — I50.9 CONGESTIVE HEART FAILURE (HCC): Status: ACTIVE | Noted: 2025-02-22

## 2025-02-22 LAB
ANION GAP SERPL CALCULATED.3IONS-SCNC: 7 MMOL/L (ref 7–16)
BUN SERPL-MCNC: 44 MG/DL (ref 6–23)
CALCIUM SERPL-MCNC: 9.2 MG/DL (ref 8.6–10.2)
CHLORIDE SERPL-SCNC: 100 MMOL/L (ref 98–107)
CO2 SERPL-SCNC: 37 MMOL/L (ref 22–29)
CREAT SERPL-MCNC: 1.4 MG/DL (ref 0.5–1)
GFR, ESTIMATED: 37 ML/MIN/1.73M2
GLUCOSE SERPL-MCNC: 89 MG/DL (ref 74–99)
POTASSIUM SERPL-SCNC: 4.6 MMOL/L (ref 3.5–5)
SODIUM SERPL-SCNC: 144 MMOL/L (ref 132–146)

## 2025-02-22 PROCEDURE — 6370000000 HC RX 637 (ALT 250 FOR IP): Performed by: INTERNAL MEDICINE

## 2025-02-22 PROCEDURE — APPSS60 APP SPLIT SHARED TIME 46-60 MINUTES

## 2025-02-22 PROCEDURE — 6360000002 HC RX W HCPCS: Performed by: NURSE PRACTITIONER

## 2025-02-22 PROCEDURE — 2500000003 HC RX 250 WO HCPCS: Performed by: NURSE PRACTITIONER

## 2025-02-22 PROCEDURE — 2060000000 HC ICU INTERMEDIATE R&B

## 2025-02-22 PROCEDURE — 2700000000 HC OXYGEN THERAPY PER DAY

## 2025-02-22 PROCEDURE — 6370000000 HC RX 637 (ALT 250 FOR IP): Performed by: NURSE PRACTITIONER

## 2025-02-22 PROCEDURE — 80048 BASIC METABOLIC PNL TOTAL CA: CPT

## 2025-02-22 PROCEDURE — 6360000002 HC RX W HCPCS: Performed by: INTERNAL MEDICINE

## 2025-02-22 PROCEDURE — 99222 1ST HOSP IP/OBS MODERATE 55: CPT | Performed by: INTERNAL MEDICINE

## 2025-02-22 RX ORDER — FUROSEMIDE 10 MG/ML
40 INJECTION INTRAMUSCULAR; INTRAVENOUS DAILY
Status: DISCONTINUED | OUTPATIENT
Start: 2025-02-22 | End: 2025-02-24

## 2025-02-22 RX ORDER — METOPROLOL SUCCINATE 25 MG/1
25 TABLET, EXTENDED RELEASE ORAL 2 TIMES DAILY
Status: DISCONTINUED | OUTPATIENT
Start: 2025-02-22 | End: 2025-03-04 | Stop reason: HOSPADM

## 2025-02-22 RX ADMIN — METOPROLOL SUCCINATE 25 MG: 25 TABLET, EXTENDED RELEASE ORAL at 20:48

## 2025-02-22 RX ADMIN — FUROSEMIDE 20 MG: 20 TABLET ORAL at 08:33

## 2025-02-22 RX ADMIN — SODIUM CHLORIDE, PRESERVATIVE FREE 10 ML: 5 INJECTION INTRAVENOUS at 20:49

## 2025-02-22 RX ADMIN — LEVOTHYROXINE SODIUM 150 MCG: 75 TABLET ORAL at 06:39

## 2025-02-22 RX ADMIN — SODIUM CHLORIDE, PRESERVATIVE FREE 10 ML: 5 INJECTION INTRAVENOUS at 08:34

## 2025-02-22 RX ADMIN — METOPROLOL SUCCINATE 25 MG: 25 TABLET, EXTENDED RELEASE ORAL at 08:33

## 2025-02-22 RX ADMIN — ENOXAPARIN SODIUM 30 MG: 100 INJECTION SUBCUTANEOUS at 08:33

## 2025-02-22 RX ADMIN — APIXABAN 5 MG: 5 TABLET, FILM COATED ORAL at 20:49

## 2025-02-22 RX ADMIN — FUROSEMIDE 40 MG: 10 INJECTION, SOLUTION INTRAMUSCULAR; INTRAVENOUS at 15:52

## 2025-02-23 LAB
ANION GAP SERPL CALCULATED.3IONS-SCNC: 8 MMOL/L (ref 7–16)
BNP SERPL-MCNC: 3348 PG/ML (ref 0–450)
BUN SERPL-MCNC: 44 MG/DL (ref 6–23)
CALCIUM SERPL-MCNC: 8.9 MG/DL (ref 8.6–10.2)
CHLORIDE SERPL-SCNC: 99 MMOL/L (ref 98–107)
CO2 SERPL-SCNC: 35 MMOL/L (ref 22–29)
CREAT SERPL-MCNC: 1.4 MG/DL (ref 0.5–1)
ERYTHROCYTE [DISTWIDTH] IN BLOOD BY AUTOMATED COUNT: 17 % (ref 11.5–15)
GFR, ESTIMATED: 38 ML/MIN/1.73M2
GLUCOSE SERPL-MCNC: 137 MG/DL (ref 74–99)
HCT VFR BLD AUTO: 47.7 % (ref 34–48)
HGB BLD-MCNC: 13.6 G/DL (ref 11.5–15.5)
MCH RBC QN AUTO: 27.3 PG (ref 26–35)
MCHC RBC AUTO-ENTMCNC: 28.5 G/DL (ref 32–34.5)
MCV RBC AUTO: 95.6 FL (ref 80–99.9)
PLATELET, FLUORESCENCE: 125 K/UL (ref 130–450)
PMV BLD AUTO: 10.5 FL (ref 7–12)
POTASSIUM SERPL-SCNC: 4.2 MMOL/L (ref 3.5–5)
RBC # BLD AUTO: 4.99 M/UL (ref 3.5–5.5)
SODIUM SERPL-SCNC: 142 MMOL/L (ref 132–146)
WBC OTHER # BLD: 6.1 K/UL (ref 4.5–11.5)

## 2025-02-23 PROCEDURE — 83880 ASSAY OF NATRIURETIC PEPTIDE: CPT

## 2025-02-23 PROCEDURE — 6370000000 HC RX 637 (ALT 250 FOR IP): Performed by: INTERNAL MEDICINE

## 2025-02-23 PROCEDURE — 2700000000 HC OXYGEN THERAPY PER DAY

## 2025-02-23 PROCEDURE — 80048 BASIC METABOLIC PNL TOTAL CA: CPT

## 2025-02-23 PROCEDURE — 99232 SBSQ HOSP IP/OBS MODERATE 35: CPT | Performed by: INTERNAL MEDICINE

## 2025-02-23 PROCEDURE — 2500000003 HC RX 250 WO HCPCS: Performed by: NURSE PRACTITIONER

## 2025-02-23 PROCEDURE — 85027 COMPLETE CBC AUTOMATED: CPT

## 2025-02-23 PROCEDURE — 6370000000 HC RX 637 (ALT 250 FOR IP): Performed by: NURSE PRACTITIONER

## 2025-02-23 PROCEDURE — 6360000002 HC RX W HCPCS: Performed by: INTERNAL MEDICINE

## 2025-02-23 PROCEDURE — 2060000000 HC ICU INTERMEDIATE R&B

## 2025-02-23 RX ADMIN — METOPROLOL SUCCINATE 25 MG: 25 TABLET, EXTENDED RELEASE ORAL at 19:53

## 2025-02-23 RX ADMIN — SODIUM CHLORIDE, PRESERVATIVE FREE 10 ML: 5 INJECTION INTRAVENOUS at 07:55

## 2025-02-23 RX ADMIN — METOPROLOL SUCCINATE 25 MG: 25 TABLET, EXTENDED RELEASE ORAL at 07:55

## 2025-02-23 RX ADMIN — APIXABAN 5 MG: 5 TABLET, FILM COATED ORAL at 07:55

## 2025-02-23 RX ADMIN — APIXABAN 5 MG: 5 TABLET, FILM COATED ORAL at 19:53

## 2025-02-23 RX ADMIN — SODIUM CHLORIDE, PRESERVATIVE FREE 10 ML: 5 INJECTION INTRAVENOUS at 19:53

## 2025-02-23 RX ADMIN — LEVOTHYROXINE SODIUM 150 MCG: 75 TABLET ORAL at 05:36

## 2025-02-23 RX ADMIN — FUROSEMIDE 40 MG: 10 INJECTION, SOLUTION INTRAMUSCULAR; INTRAVENOUS at 07:55

## 2025-02-24 LAB
ANION GAP SERPL CALCULATED.3IONS-SCNC: 4 MMOL/L (ref 7–16)
BUN SERPL-MCNC: 39 MG/DL (ref 6–23)
CALCIUM SERPL-MCNC: 9.5 MG/DL (ref 8.6–10.2)
CHLORIDE SERPL-SCNC: 100 MMOL/L (ref 98–107)
CO2 SERPL-SCNC: 40 MMOL/L (ref 22–29)
CREAT SERPL-MCNC: 1.5 MG/DL (ref 0.5–1)
ERYTHROCYTE [DISTWIDTH] IN BLOOD BY AUTOMATED COUNT: 16.8 % (ref 11.5–15)
GFR, ESTIMATED: 35 ML/MIN/1.73M2
GLUCOSE SERPL-MCNC: 96 MG/DL (ref 74–99)
HCT VFR BLD AUTO: 47 % (ref 34–48)
HGB BLD-MCNC: 13.4 G/DL (ref 11.5–15.5)
MCH RBC QN AUTO: 27.5 PG (ref 26–35)
MCHC RBC AUTO-ENTMCNC: 28.5 G/DL (ref 32–34.5)
MCV RBC AUTO: 96.5 FL (ref 80–99.9)
PLATELET, FLUORESCENCE: 120 K/UL (ref 130–450)
PMV BLD AUTO: 10.8 FL (ref 7–12)
POTASSIUM SERPL-SCNC: 4.8 MMOL/L (ref 3.5–5)
RBC # BLD AUTO: 4.87 M/UL (ref 3.5–5.5)
SODIUM SERPL-SCNC: 144 MMOL/L (ref 132–146)
WBC OTHER # BLD: 6.4 K/UL (ref 4.5–11.5)

## 2025-02-24 PROCEDURE — 6370000000 HC RX 637 (ALT 250 FOR IP): Performed by: INTERNAL MEDICINE

## 2025-02-24 PROCEDURE — 97530 THERAPEUTIC ACTIVITIES: CPT

## 2025-02-24 PROCEDURE — 2500000003 HC RX 250 WO HCPCS: Performed by: NURSE PRACTITIONER

## 2025-02-24 PROCEDURE — 6370000000 HC RX 637 (ALT 250 FOR IP): Performed by: STUDENT IN AN ORGANIZED HEALTH CARE EDUCATION/TRAINING PROGRAM

## 2025-02-24 PROCEDURE — 80048 BASIC METABOLIC PNL TOTAL CA: CPT

## 2025-02-24 PROCEDURE — 36415 COLL VENOUS BLD VENIPUNCTURE: CPT

## 2025-02-24 PROCEDURE — 6360000002 HC RX W HCPCS: Performed by: INTERNAL MEDICINE

## 2025-02-24 PROCEDURE — 99233 SBSQ HOSP IP/OBS HIGH 50: CPT | Performed by: INTERNAL MEDICINE

## 2025-02-24 PROCEDURE — 85027 COMPLETE CBC AUTOMATED: CPT

## 2025-02-24 PROCEDURE — 94640 AIRWAY INHALATION TREATMENT: CPT

## 2025-02-24 PROCEDURE — 2700000000 HC OXYGEN THERAPY PER DAY

## 2025-02-24 PROCEDURE — 2060000000 HC ICU INTERMEDIATE R&B

## 2025-02-24 PROCEDURE — 94664 DEMO&/EVAL PT USE INHALER: CPT

## 2025-02-24 PROCEDURE — 6370000000 HC RX 637 (ALT 250 FOR IP): Performed by: NURSE PRACTITIONER

## 2025-02-24 PROCEDURE — 6360000002 HC RX W HCPCS: Performed by: STUDENT IN AN ORGANIZED HEALTH CARE EDUCATION/TRAINING PROGRAM

## 2025-02-24 RX ORDER — FUROSEMIDE 10 MG/ML
40 INJECTION INTRAMUSCULAR; INTRAVENOUS 2 TIMES DAILY
Status: DISCONTINUED | OUTPATIENT
Start: 2025-02-24 | End: 2025-02-27

## 2025-02-24 RX ORDER — ARFORMOTEROL TARTRATE 15 UG/2ML
15 SOLUTION RESPIRATORY (INHALATION)
Status: DISCONTINUED | OUTPATIENT
Start: 2025-02-24 | End: 2025-03-04 | Stop reason: HOSPADM

## 2025-02-24 RX ORDER — BUDESONIDE 0.5 MG/2ML
0.5 INHALANT ORAL
Status: DISCONTINUED | OUTPATIENT
Start: 2025-02-24 | End: 2025-03-04 | Stop reason: HOSPADM

## 2025-02-24 RX ORDER — MONTELUKAST SODIUM 10 MG/1
10 TABLET ORAL NIGHTLY
Status: DISCONTINUED | OUTPATIENT
Start: 2025-02-24 | End: 2025-03-04 | Stop reason: HOSPADM

## 2025-02-24 RX ADMIN — BUDESONIDE 500 MCG: 0.5 SUSPENSION RESPIRATORY (INHALATION) at 20:39

## 2025-02-24 RX ADMIN — METOPROLOL SUCCINATE 25 MG: 25 TABLET, EXTENDED RELEASE ORAL at 10:08

## 2025-02-24 RX ADMIN — MONTELUKAST 10 MG: 10 TABLET, FILM COATED ORAL at 21:23

## 2025-02-24 RX ADMIN — SODIUM CHLORIDE, PRESERVATIVE FREE 10 ML: 5 INJECTION INTRAVENOUS at 21:23

## 2025-02-24 RX ADMIN — FUROSEMIDE 40 MG: 10 INJECTION, SOLUTION INTRAMUSCULAR; INTRAVENOUS at 10:08

## 2025-02-24 RX ADMIN — METOPROLOL SUCCINATE 25 MG: 25 TABLET, EXTENDED RELEASE ORAL at 21:23

## 2025-02-24 RX ADMIN — BUDESONIDE 500 MCG: 0.5 SUSPENSION RESPIRATORY (INHALATION) at 12:21

## 2025-02-24 RX ADMIN — FUROSEMIDE 40 MG: 10 INJECTION, SOLUTION INTRAMUSCULAR; INTRAVENOUS at 18:14

## 2025-02-24 RX ADMIN — APIXABAN 5 MG: 5 TABLET, FILM COATED ORAL at 10:08

## 2025-02-24 RX ADMIN — SODIUM CHLORIDE, PRESERVATIVE FREE 10 ML: 5 INJECTION INTRAVENOUS at 10:08

## 2025-02-24 RX ADMIN — ARFORMOTEROL TARTRATE 15 MCG: 15 SOLUTION RESPIRATORY (INHALATION) at 20:39

## 2025-02-24 RX ADMIN — LEVOTHYROXINE SODIUM 150 MCG: 75 TABLET ORAL at 05:01

## 2025-02-24 RX ADMIN — ARFORMOTEROL TARTRATE 15 MCG: 15 SOLUTION RESPIRATORY (INHALATION) at 12:21

## 2025-02-24 RX ADMIN — APIXABAN 5 MG: 5 TABLET, FILM COATED ORAL at 21:23

## 2025-02-24 ASSESSMENT — PAIN SCALES - GENERAL: PAINLEVEL_OUTOF10: 0

## 2025-02-24 NOTE — CARE COORDINATION
Plan on discharge is home with [facility] and Home O2 from [facility].  Both agencies will require notification at time of discharge.  Patient will need followed and assisted with discharge planning as necessary.   Tushar Jaramillo, MSN RN  [facility] Case Management  [phone]

## 2025-02-25 LAB
ANION GAP SERPL CALCULATED.3IONS-SCNC: 7 MMOL/L (ref 7–16)
BNP SERPL-MCNC: 3249 PG/ML (ref 0–450)
BUN SERPL-MCNC: 37 MG/DL (ref 6–23)
CALCIUM SERPL-MCNC: 9.5 MG/DL (ref 8.6–10.2)
CHLORIDE SERPL-SCNC: 100 MMOL/L (ref 98–107)
CO2 SERPL-SCNC: 41 MMOL/L (ref 22–29)
CREAT SERPL-MCNC: 1.5 MG/DL (ref 0.5–1)
ERYTHROCYTE [DISTWIDTH] IN BLOOD BY AUTOMATED COUNT: 16.6 % (ref 11.5–15)
GFR, ESTIMATED: 35 ML/MIN/1.73M2
GLUCOSE SERPL-MCNC: 116 MG/DL (ref 74–99)
HCT VFR BLD AUTO: 48.9 % (ref 34–48)
HGB BLD-MCNC: 13.8 G/DL (ref 11.5–15.5)
MCH RBC QN AUTO: 27.4 PG (ref 26–35)
MCHC RBC AUTO-ENTMCNC: 28.2 G/DL (ref 32–34.5)
MCV RBC AUTO: 97 FL (ref 80–99.9)
PLATELET, FLUORESCENCE: 125 K/UL (ref 130–450)
PMV BLD AUTO: 11.1 FL (ref 7–12)
POTASSIUM SERPL-SCNC: 4.1 MMOL/L (ref 3.5–5)
RBC # BLD AUTO: 5.04 M/UL (ref 3.5–5.5)
SODIUM SERPL-SCNC: 148 MMOL/L (ref 132–146)
WBC OTHER # BLD: 6.2 K/UL (ref 4.5–11.5)

## 2025-02-25 PROCEDURE — 6370000000 HC RX 637 (ALT 250 FOR IP): Performed by: INTERNAL MEDICINE

## 2025-02-25 PROCEDURE — 6360000002 HC RX W HCPCS: Performed by: STUDENT IN AN ORGANIZED HEALTH CARE EDUCATION/TRAINING PROGRAM

## 2025-02-25 PROCEDURE — 83880 ASSAY OF NATRIURETIC PEPTIDE: CPT

## 2025-02-25 PROCEDURE — 2700000000 HC OXYGEN THERAPY PER DAY

## 2025-02-25 PROCEDURE — 94640 AIRWAY INHALATION TREATMENT: CPT

## 2025-02-25 PROCEDURE — 6370000000 HC RX 637 (ALT 250 FOR IP): Performed by: NURSE PRACTITIONER

## 2025-02-25 PROCEDURE — 6370000000 HC RX 637 (ALT 250 FOR IP): Performed by: STUDENT IN AN ORGANIZED HEALTH CARE EDUCATION/TRAINING PROGRAM

## 2025-02-25 PROCEDURE — 99233 SBSQ HOSP IP/OBS HIGH 50: CPT | Performed by: INTERNAL MEDICINE

## 2025-02-25 PROCEDURE — 80048 BASIC METABOLIC PNL TOTAL CA: CPT

## 2025-02-25 PROCEDURE — 2500000003 HC RX 250 WO HCPCS: Performed by: NURSE PRACTITIONER

## 2025-02-25 PROCEDURE — 6360000002 HC RX W HCPCS: Performed by: INTERNAL MEDICINE

## 2025-02-25 PROCEDURE — 2060000000 HC ICU INTERMEDIATE R&B

## 2025-02-25 PROCEDURE — 2500000003 HC RX 250 WO HCPCS: Performed by: INTERNAL MEDICINE

## 2025-02-25 PROCEDURE — 85027 COMPLETE CBC AUTOMATED: CPT

## 2025-02-25 PROCEDURE — 36415 COLL VENOUS BLD VENIPUNCTURE: CPT

## 2025-02-25 RX ORDER — ACETAZOLAMIDE 500 MG/5ML
500 INJECTION, POWDER, LYOPHILIZED, FOR SOLUTION INTRAVENOUS ONCE
Status: COMPLETED | OUTPATIENT
Start: 2025-02-25 | End: 2025-02-25

## 2025-02-25 RX ORDER — METOLAZONE 5 MG/1
5 TABLET ORAL ONCE
Status: COMPLETED | OUTPATIENT
Start: 2025-02-25 | End: 2025-02-25

## 2025-02-25 RX ADMIN — SODIUM CHLORIDE, PRESERVATIVE FREE 10 ML: 5 INJECTION INTRAVENOUS at 09:05

## 2025-02-25 RX ADMIN — BUDESONIDE 500 MCG: 0.5 SUSPENSION RESPIRATORY (INHALATION) at 08:53

## 2025-02-25 RX ADMIN — ACETAZOLAMIDE SODIUM 500 MG: 500 INJECTION, POWDER, LYOPHILIZED, FOR SOLUTION INTRAVENOUS at 14:16

## 2025-02-25 RX ADMIN — MICONAZOLE NITRATE: 20 POWDER TOPICAL at 20:54

## 2025-02-25 RX ADMIN — MONTELUKAST 10 MG: 10 TABLET, FILM COATED ORAL at 20:51

## 2025-02-25 RX ADMIN — BUDESONIDE 500 MCG: 0.5 SUSPENSION RESPIRATORY (INHALATION) at 18:22

## 2025-02-25 RX ADMIN — METOLAZONE 5 MG: 5 TABLET ORAL at 14:15

## 2025-02-25 RX ADMIN — APIXABAN 5 MG: 5 TABLET, FILM COATED ORAL at 20:51

## 2025-02-25 RX ADMIN — ARFORMOTEROL TARTRATE 15 MCG: 15 SOLUTION RESPIRATORY (INHALATION) at 08:53

## 2025-02-25 RX ADMIN — ARFORMOTEROL TARTRATE 15 MCG: 15 SOLUTION RESPIRATORY (INHALATION) at 18:22

## 2025-02-25 RX ADMIN — SODIUM CHLORIDE, PRESERVATIVE FREE 10 ML: 5 INJECTION INTRAVENOUS at 20:51

## 2025-02-25 RX ADMIN — FUROSEMIDE 40 MG: 10 INJECTION, SOLUTION INTRAMUSCULAR; INTRAVENOUS at 17:48

## 2025-02-25 RX ADMIN — APIXABAN 5 MG: 5 TABLET, FILM COATED ORAL at 09:05

## 2025-02-25 RX ADMIN — METOPROLOL SUCCINATE 25 MG: 25 TABLET, EXTENDED RELEASE ORAL at 09:05

## 2025-02-25 RX ADMIN — METOPROLOL SUCCINATE 25 MG: 25 TABLET, EXTENDED RELEASE ORAL at 20:51

## 2025-02-25 RX ADMIN — LEVOTHYROXINE SODIUM 150 MCG: 75 TABLET ORAL at 06:39

## 2025-02-25 RX ADMIN — FUROSEMIDE 40 MG: 10 INJECTION, SOLUTION INTRAMUSCULAR; INTRAVENOUS at 09:05

## 2025-02-25 ASSESSMENT — PAIN SCALES - GENERAL
PAINLEVEL_OUTOF10: 0

## 2025-02-26 ENCOUNTER — ANESTHESIA EVENT (OUTPATIENT)
Age: 80
End: 2025-02-26
Payer: MEDICARE

## 2025-02-26 ENCOUNTER — ANESTHESIA (OUTPATIENT)
Age: 80
End: 2025-02-26
Payer: MEDICARE

## 2025-02-26 ENCOUNTER — HOSPITAL ENCOUNTER (INPATIENT)
Age: 80
Discharge: HOME | DRG: 291 | End: 2025-02-28
Payer: MEDICARE

## 2025-02-26 VITALS
DIASTOLIC BLOOD PRESSURE: 71 MMHG | TEMPERATURE: 98 F | OXYGEN SATURATION: 97 % | SYSTOLIC BLOOD PRESSURE: 106 MMHG | HEART RATE: 70 BPM | RESPIRATION RATE: 28 BRPM

## 2025-02-26 LAB
ECHO AV AREA PLAN/BSA: 0.61 CM2/M2
ECHO AV AREA PLAN: 1.4 CM2
ECHO BSA: 2.43 M2
GLUCOSE BLD-MCNC: 111 MG/DL (ref 74–99)
LEFT VENTRICULAR EJECTION FRACTION HIGH VALUE: 55 %
LEFT VENTRICULAR EJECTION FRACTION MODE: NORMAL
LV EF: 50 %

## 2025-02-26 PROCEDURE — 3700000000 HC ANESTHESIA ATTENDED CARE

## 2025-02-26 PROCEDURE — 6360000002 HC RX W HCPCS

## 2025-02-26 PROCEDURE — 93325 DOPPLER ECHO COLOR FLOW MAPG: CPT | Performed by: INTERNAL MEDICINE

## 2025-02-26 PROCEDURE — 93312 ECHO TRANSESOPHAGEAL: CPT | Performed by: INTERNAL MEDICINE

## 2025-02-26 PROCEDURE — 6360000002 HC RX W HCPCS: Performed by: STUDENT IN AN ORGANIZED HEALTH CARE EDUCATION/TRAINING PROGRAM

## 2025-02-26 PROCEDURE — 99233 SBSQ HOSP IP/OBS HIGH 50: CPT | Performed by: INTERNAL MEDICINE

## 2025-02-26 PROCEDURE — 93005 ELECTROCARDIOGRAM TRACING: CPT | Performed by: INTERNAL MEDICINE

## 2025-02-26 PROCEDURE — 6370000000 HC RX 637 (ALT 250 FOR IP): Performed by: NURSE PRACTITIONER

## 2025-02-26 PROCEDURE — 6360000002 HC RX W HCPCS: Performed by: INTERNAL MEDICINE

## 2025-02-26 PROCEDURE — 3700000001 HC ADD 15 MINUTES (ANESTHESIA)

## 2025-02-26 PROCEDURE — 92960 CARDIOVERSION ELECTRIC EXT: CPT

## 2025-02-26 PROCEDURE — 92960 CARDIOVERSION ELECTRIC EXT: CPT | Performed by: INTERNAL MEDICINE

## 2025-02-26 PROCEDURE — 2580000003 HC RX 258: Performed by: NURSE ANESTHETIST, CERTIFIED REGISTERED

## 2025-02-26 PROCEDURE — 7100000011 HC PHASE II RECOVERY - ADDTL 15 MIN

## 2025-02-26 PROCEDURE — 6360000002 HC RX W HCPCS: Performed by: NURSE ANESTHETIST, CERTIFIED REGISTERED

## 2025-02-26 PROCEDURE — 6370000000 HC RX 637 (ALT 250 FOR IP): Performed by: INTERNAL MEDICINE

## 2025-02-26 PROCEDURE — 7100000010 HC PHASE II RECOVERY - FIRST 15 MIN

## 2025-02-26 PROCEDURE — 2500000003 HC RX 250 WO HCPCS: Performed by: NURSE PRACTITIONER

## 2025-02-26 PROCEDURE — 6370000000 HC RX 637 (ALT 250 FOR IP): Performed by: STUDENT IN AN ORGANIZED HEALTH CARE EDUCATION/TRAINING PROGRAM

## 2025-02-26 PROCEDURE — 2500000003 HC RX 250 WO HCPCS: Performed by: INTERNAL MEDICINE

## 2025-02-26 PROCEDURE — 2700000000 HC OXYGEN THERAPY PER DAY

## 2025-02-26 PROCEDURE — 94640 AIRWAY INHALATION TREATMENT: CPT

## 2025-02-26 PROCEDURE — 93320 DOPPLER ECHO COMPLETE: CPT | Performed by: INTERNAL MEDICINE

## 2025-02-26 PROCEDURE — 2060000000 HC ICU INTERMEDIATE R&B

## 2025-02-26 PROCEDURE — 82962 GLUCOSE BLOOD TEST: CPT

## 2025-02-26 RX ORDER — METOLAZONE 2.5 MG/1
2.5 TABLET ORAL ONCE
Status: COMPLETED | OUTPATIENT
Start: 2025-02-26 | End: 2025-02-26

## 2025-02-26 RX ORDER — ACETAZOLAMIDE 500 MG/5ML
500 INJECTION, POWDER, LYOPHILIZED, FOR SOLUTION INTRAVENOUS ONCE
Status: COMPLETED | OUTPATIENT
Start: 2025-02-26 | End: 2025-02-26

## 2025-02-26 RX ORDER — PROPOFOL 10 MG/ML
INJECTION, EMULSION INTRAVENOUS
Status: DISCONTINUED | OUTPATIENT
Start: 2025-02-26 | End: 2025-02-26 | Stop reason: SDUPTHER

## 2025-02-26 RX ORDER — LIDOCAINE HYDROCHLORIDE 20 MG/ML
INJECTION, SOLUTION INFILTRATION; PERINEURAL
Status: DISCONTINUED | OUTPATIENT
Start: 2025-02-26 | End: 2025-02-26 | Stop reason: SDUPTHER

## 2025-02-26 RX ORDER — SODIUM CHLORIDE 9 MG/ML
INJECTION, SOLUTION INTRAVENOUS
Status: DISCONTINUED | OUTPATIENT
Start: 2025-02-26 | End: 2025-02-26 | Stop reason: SDUPTHER

## 2025-02-26 RX ADMIN — PHENYLEPHRINE HYDROCHLORIDE 100 MCG: 10 INJECTION INTRAVENOUS at 08:45

## 2025-02-26 RX ADMIN — SODIUM CHLORIDE, PRESERVATIVE FREE 10 ML: 5 INJECTION INTRAVENOUS at 07:48

## 2025-02-26 RX ADMIN — METOLAZONE 2.5 MG: 2.5 TABLET ORAL at 14:34

## 2025-02-26 RX ADMIN — LIDOCAINE HYDROCHLORIDE 40 MG: 20 INJECTION, SOLUTION INFILTRATION; PERINEURAL at 08:39

## 2025-02-26 RX ADMIN — APIXABAN 2.5 MG: 2.5 TABLET, FILM COATED ORAL at 07:48

## 2025-02-26 RX ADMIN — MICONAZOLE NITRATE: 20 POWDER TOPICAL at 07:48

## 2025-02-26 RX ADMIN — ACETAZOLAMIDE SODIUM 500 MG: 500 INJECTION, POWDER, LYOPHILIZED, FOR SOLUTION INTRAVENOUS at 14:34

## 2025-02-26 RX ADMIN — FUROSEMIDE 40 MG: 10 INJECTION, SOLUTION INTRAMUSCULAR; INTRAVENOUS at 10:35

## 2025-02-26 RX ADMIN — MONTELUKAST 10 MG: 10 TABLET, FILM COATED ORAL at 21:57

## 2025-02-26 RX ADMIN — SODIUM CHLORIDE, PRESERVATIVE FREE 10 ML: 5 INJECTION INTRAVENOUS at 21:57

## 2025-02-26 RX ADMIN — METOPROLOL SUCCINATE 25 MG: 25 TABLET, EXTENDED RELEASE ORAL at 21:57

## 2025-02-26 RX ADMIN — FUROSEMIDE 40 MG: 10 INJECTION, SOLUTION INTRAMUSCULAR; INTRAVENOUS at 17:57

## 2025-02-26 RX ADMIN — BUDESONIDE 500 MCG: 0.5 SUSPENSION RESPIRATORY (INHALATION) at 20:45

## 2025-02-26 RX ADMIN — PHENYLEPHRINE HYDROCHLORIDE 100 MCG: 10 INJECTION INTRAVENOUS at 08:49

## 2025-02-26 RX ADMIN — PROPOFOL 70 MG: 10 INJECTION, EMULSION INTRAVENOUS at 08:39

## 2025-02-26 RX ADMIN — SODIUM CHLORIDE: 9 INJECTION, SOLUTION INTRAVENOUS at 08:10

## 2025-02-26 RX ADMIN — APIXABAN 2.5 MG: 2.5 TABLET, FILM COATED ORAL at 21:57

## 2025-02-26 RX ADMIN — METOPROLOL SUCCINATE 25 MG: 25 TABLET, EXTENDED RELEASE ORAL at 07:48

## 2025-02-26 RX ADMIN — MICONAZOLE NITRATE: 20 POWDER TOPICAL at 21:57

## 2025-02-26 RX ADMIN — LEVOTHYROXINE SODIUM 150 MCG: 75 TABLET ORAL at 05:26

## 2025-02-26 RX ADMIN — ARFORMOTEROL TARTRATE 15 MCG: 15 SOLUTION RESPIRATORY (INHALATION) at 20:45

## 2025-02-26 ASSESSMENT — PAIN SCALES - GENERAL
PAINLEVEL_OUTOF10: 0

## 2025-02-26 ASSESSMENT — LIFESTYLE VARIABLES: SMOKING_STATUS: 0

## 2025-02-26 NOTE — ANESTHESIA POSTPROCEDURE EVALUATION
Department of Anesthesiology  Postprocedure Note    Patient: Aggie Carter  MRN: [id number]  YOB: 1945  Date of evaluation: 2/26/2025    Procedure Summary       Date: 02/26/25 Room / Location: [facility] Cardiology    Anesthesia Start: 0825 Anesthesia Stop:     Procedure: CHUNG W/ POSSIBLE CARDIOVERSION (PRN CONTRAST/BUBBLE/3D) Diagnosis: Paroxysmal atrial fibrillation (HCC)    Scheduled Providers:  Responsible Provider: Barbara Nieves DO    Anesthesia Type: MAC ASA Status: 4            Anesthesia Type: No value filed.    Janusz Phase I:      Janusz Phase II:      Anesthesia Post Evaluation    Patient location during evaluation: PACU  Patient participation: complete - patient participated  Level of consciousness: awake  Airway patency: patent  Nausea & Vomiting: no nausea and no vomiting  Cardiovascular status: hemodynamically stable  Respiratory status: acceptable  Hydration status: euvolemic  Pain management: adequate        No notable events documented.

## 2025-02-26 NOTE — ANESTHESIA PRE PROCEDURE
[FreeTextEntry3] : I, Jaz Rodriguez solely acted as scribe for Dr. Emelyn Liao on 06/19/2024  All medical entries made by the Scribe were at my, Dr. Regalado, direction and personally dictated by me on 06/19/2024 . I have reviewed the chart and agree that the record accurately reflects my personal performance of the history, physical exam, assessment and plan. I have also personally directed, reviewed, and agreed with the chart. 02/26/25 0748   • montelukast (SINGULAIR) tablet 10 mg  10 mg Oral Nightly Abhinav Middleton, DO   10 mg at 02/25/25 2051   • arformoterol tartrate (BROVANA) nebulizer solution 15 mcg  15 mcg Nebulization BID RT Abhinav Middleton DO   15 mcg at 02/25/25 1822    And   • budesonide (PULMICORT) nebulizer suspension 500 mcg  0.5 mg Nebulization BID RT Abhinav Middleton, DO   500 mcg at 02/25/25 1822   • furosemide (LASIX) injection 40 mg  40 mg IntraVENous BID Simone Chan MD   40 mg at 02/25/25 1748   • metoprolol succinate (TOPROL XL) extended release tablet 25 mg  25 mg Oral BID Dimas Sewell DO   25 mg at 02/26/25 0748   • levothyroxine (SYNTHROID) tablet 150 mcg  150 mcg Oral Daily Diandra Kolb APRN - CNP   150 mcg at 02/26/25 0526   • sodium chloride flush 0.9 % injection 5-40 mL  5-40 mL IntraVENous 2 times per day Diandra Kolb APRN - CNP   10 mL at 02/26/25 0748   • sodium chloride flush 0.9 % injection 10 mL  10 mL IntraVENous PRN Diandra Kolb APRN - CNP       • 0.9 % sodium chloride infusion   IntraVENous PRN Diandra Kolb APRN - CNP       • ondansetron (ZOFRAN-ODT) disintegrating tablet 4 mg  4 mg Oral Q8H PRN Diandra Kolb APRN - CNP        Or   • ondansetron (ZOFRAN) injection 4 mg  4 mg IntraVENous Q6H PRN Diandra Kolb APRN - CNP       • magnesium hydroxide (MILK OF MAGNESIA) 400 MG/5ML suspension 30 mL  30 mL Oral Daily PRN Diandra Kolb APRN - CNP       • acetaminophen (TYLENOL) tablet 650 mg  650 mg Oral Q6H PRN Diandra Kolb APRN - CNP        Or   • acetaminophen (TYLENOL) suppository 650 mg  650 mg Rectal Q6H PRN Diandra Kolb APRN - CNP       • potassium chloride (KLOR-CON M) extended release tablet 40 mEq  40 mEq Oral PRN Kolb, Diandra, APRN - CNP        Or   • potassium bicarb-citric acid (EFFER-K) effervescent tablet 40 mEq  40 mEq Oral PRN Diandra Kolb APRN - CNP        Or   • potassium chloride 10 mEq/100 mL IVPB (Peripheral Line)  10 mEq IntraVENous PRN

## 2025-02-26 NOTE — CARE COORDINATION
Plan at discharge remains for a return to home.  [facility] has accepted and needs notified at time of discharge.  [facility] is following for home O2, need O2 saturation documentation prior to discharge and accompanying DME order.  Granddaughter called in and communicated family has made arrangements for private duty care giver support as well with [employer].  Patient will need followed and assisted with discharge planning as necessary.   Tushar Jaramillo, MSN RN  [facility] Case Management  [phone]

## 2025-02-27 ENCOUNTER — APPOINTMENT (OUTPATIENT)
Dept: GENERAL RADIOLOGY | Age: 80
DRG: 291 | End: 2025-02-27
Payer: MEDICARE

## 2025-02-27 LAB
ALBUMIN SERPL-MCNC: 3.5 G/DL (ref 3.5–5.2)
ALP SERPL-CCNC: 64 U/L (ref 35–104)
ALT SERPL-CCNC: 11 U/L (ref 0–32)
ANION GAP SERPL CALCULATED.3IONS-SCNC: 10 MMOL/L (ref 7–16)
AST SERPL-CCNC: 9 U/L (ref 0–31)
B.E.: 18.5 MMOL/L (ref -3–3)
B.E.: 20.2 MMOL/L (ref -3–3)
BILIRUB SERPL-MCNC: 0.7 MG/DL (ref 0–1.2)
BUN SERPL-MCNC: 42 MG/DL (ref 6–23)
CALCIUM SERPL-MCNC: 9.6 MG/DL (ref 8.6–10.2)
CHLORIDE SERPL-SCNC: 93 MMOL/L (ref 98–107)
CO2 SERPL-SCNC: 42 MMOL/L (ref 22–29)
COHB: 1.8 % (ref 0–1.5)
COHB: 1.9 % (ref 0–1.5)
CREAT SERPL-MCNC: 1.6 MG/DL (ref 0.5–1)
CRITICAL: ABNORMAL
CRITICAL: ABNORMAL
DATE ANALYZED: ABNORMAL
DATE ANALYZED: ABNORMAL
DATE OF COLLECTION: ABNORMAL
DATE OF COLLECTION: ABNORMAL
EKG ATRIAL RATE: 74 BPM
EKG P AXIS: 48 DEGREES
EKG P-R INTERVAL: 226 MS
EKG Q-T INTERVAL: 472 MS
EKG QRS DURATION: 88 MS
EKG QTC CALCULATION (BAZETT): 523 MS
EKG R AXIS: -67 DEGREES
EKG T AXIS: 43 DEGREES
EKG VENTRICULAR RATE: 74 BPM
ERYTHROCYTE [DISTWIDTH] IN BLOOD BY AUTOMATED COUNT: 16.3 % (ref 11.5–15)
GFR, ESTIMATED: 32 ML/MIN/1.73M2
GLUCOSE SERPL-MCNC: 105 MG/DL (ref 74–99)
HCO3: 49.4 MMOL/L (ref 22–26)
HCO3: 51.9 MMOL/L (ref 22–26)
HCT VFR BLD AUTO: 45.6 % (ref 34–48)
HGB BLD-MCNC: 13 G/DL (ref 11.5–15.5)
HHB: 5.5 % (ref 0–5)
HHB: 5.9 % (ref 0–5)
LAB: ABNORMAL
LAB: ABNORMAL
Lab: 1106
Lab: 1410
MCH RBC QN AUTO: 27.6 PG (ref 26–35)
MCHC RBC AUTO-ENTMCNC: 28.5 G/DL (ref 32–34.5)
MCV RBC AUTO: 96.8 FL (ref 80–99.9)
METHB: 0.3 % (ref 0–1.5)
METHB: 0.3 % (ref 0–1.5)
MODE: ABNORMAL
MODE: ABNORMAL
O2 CONTENT: 18.1 ML/DL
O2 CONTENT: 18.3 ML/DL
O2 SATURATION: 94 % (ref 92–98.5)
O2 SATURATION: 94.4 % (ref 92–98.5)
O2HB: 91.9 % (ref 94–97)
O2HB: 92.4 % (ref 94–97)
OPERATOR ID: 366
OPERATOR ID: 5132
PATIENT TEMP: 37 C
PATIENT TEMP: 37 C
PCO2: 101.1 MMHG (ref 35–45)
PCO2: 93 MMHG (ref 35–45)
PH BLOOD GAS: 7.33 (ref 7.35–7.45)
PH BLOOD GAS: 7.34 (ref 7.35–7.45)
PLATELET CONFIRMATION: NORMAL
PLATELET, FLUORESCENCE: 98 K/UL (ref 130–450)
PMV BLD AUTO: 11.8 FL (ref 7–12)
PO2: 70.7 MMHG (ref 75–100)
PO2: 72.1 MMHG (ref 75–100)
POTASSIUM SERPL-SCNC: 3.2 MMOL/L (ref 3.5–5)
PROT SERPL-MCNC: 6.4 G/DL (ref 6.4–8.3)
RBC # BLD AUTO: 4.71 M/UL (ref 3.5–5.5)
SODIUM SERPL-SCNC: 145 MMOL/L (ref 132–146)
SOURCE, BLOOD GAS: ABNORMAL
SOURCE, BLOOD GAS: ABNORMAL
THB: 14 G/DL (ref 11.5–16.5)
THB: 14.1 G/DL (ref 11.5–16.5)
TIME ANALYZED: 1117
TIME ANALYZED: 1421
WBC OTHER # BLD: 9 K/UL (ref 4.5–11.5)

## 2025-02-27 PROCEDURE — 6370000000 HC RX 637 (ALT 250 FOR IP): Performed by: STUDENT IN AN ORGANIZED HEALTH CARE EDUCATION/TRAINING PROGRAM

## 2025-02-27 PROCEDURE — 6360000002 HC RX W HCPCS: Performed by: STUDENT IN AN ORGANIZED HEALTH CARE EDUCATION/TRAINING PROGRAM

## 2025-02-27 PROCEDURE — 36415 COLL VENOUS BLD VENIPUNCTURE: CPT

## 2025-02-27 PROCEDURE — 94660 CPAP INITIATION&MGMT: CPT

## 2025-02-27 PROCEDURE — 93010 ELECTROCARDIOGRAM REPORT: CPT | Performed by: INTERNAL MEDICINE

## 2025-02-27 PROCEDURE — 71045 X-RAY EXAM CHEST 1 VIEW: CPT

## 2025-02-27 PROCEDURE — 99233 SBSQ HOSP IP/OBS HIGH 50: CPT | Performed by: INTERNAL MEDICINE

## 2025-02-27 PROCEDURE — 6360000002 HC RX W HCPCS: Performed by: INTERNAL MEDICINE

## 2025-02-27 PROCEDURE — 85027 COMPLETE CBC AUTOMATED: CPT

## 2025-02-27 PROCEDURE — 97530 THERAPEUTIC ACTIVITIES: CPT

## 2025-02-27 PROCEDURE — 94640 AIRWAY INHALATION TREATMENT: CPT

## 2025-02-27 PROCEDURE — 5A09457 ASSISTANCE WITH RESPIRATORY VENTILATION, 24-96 CONSECUTIVE HOURS, CONTINUOUS POSITIVE AIRWAY PRESSURE: ICD-10-PCS | Performed by: INTERNAL MEDICINE

## 2025-02-27 PROCEDURE — 2500000003 HC RX 250 WO HCPCS: Performed by: NURSE PRACTITIONER

## 2025-02-27 PROCEDURE — 6370000000 HC RX 637 (ALT 250 FOR IP): Performed by: INTERNAL MEDICINE

## 2025-02-27 PROCEDURE — 2060000000 HC ICU INTERMEDIATE R&B

## 2025-02-27 PROCEDURE — 80053 COMPREHEN METABOLIC PANEL: CPT

## 2025-02-27 PROCEDURE — 2700000000 HC OXYGEN THERAPY PER DAY

## 2025-02-27 PROCEDURE — 6370000000 HC RX 637 (ALT 250 FOR IP): Performed by: NURSE PRACTITIONER

## 2025-02-27 PROCEDURE — 82805 BLOOD GASES W/O2 SATURATION: CPT

## 2025-02-27 RX ORDER — POTASSIUM CHLORIDE 1500 MG/1
40 TABLET, EXTENDED RELEASE ORAL ONCE
Status: COMPLETED | OUTPATIENT
Start: 2025-02-27 | End: 2025-02-27

## 2025-02-27 RX ORDER — FUROSEMIDE 10 MG/ML
40 INJECTION INTRAMUSCULAR; INTRAVENOUS ONCE
Status: COMPLETED | OUTPATIENT
Start: 2025-02-28 | End: 2025-02-28

## 2025-02-27 RX ORDER — TORSEMIDE 20 MG/1
20 TABLET ORAL DAILY
Status: DISCONTINUED | OUTPATIENT
Start: 2025-02-28 | End: 2025-02-27

## 2025-02-27 RX ORDER — FUROSEMIDE 10 MG/ML
40 INJECTION INTRAMUSCULAR; INTRAVENOUS 3 TIMES DAILY
Status: DISCONTINUED | OUTPATIENT
Start: 2025-02-27 | End: 2025-02-28

## 2025-02-27 RX ORDER — AMIODARONE HYDROCHLORIDE 200 MG/1
200 TABLET ORAL 2 TIMES DAILY
Status: DISCONTINUED | OUTPATIENT
Start: 2025-02-27 | End: 2025-03-04 | Stop reason: HOSPADM

## 2025-02-27 RX ADMIN — ARFORMOTEROL TARTRATE 15 MCG: 15 SOLUTION RESPIRATORY (INHALATION) at 20:07

## 2025-02-27 RX ADMIN — MICONAZOLE NITRATE: 20 POWDER TOPICAL at 20:35

## 2025-02-27 RX ADMIN — AMIODARONE HYDROCHLORIDE 200 MG: 200 TABLET ORAL at 17:04

## 2025-02-27 RX ADMIN — APIXABAN 2.5 MG: 2.5 TABLET, FILM COATED ORAL at 20:30

## 2025-02-27 RX ADMIN — SODIUM CHLORIDE, PRESERVATIVE FREE 10 ML: 5 INJECTION INTRAVENOUS at 20:35

## 2025-02-27 RX ADMIN — FUROSEMIDE 40 MG: 10 INJECTION, SOLUTION INTRAMUSCULAR; INTRAVENOUS at 08:48

## 2025-02-27 RX ADMIN — METOPROLOL SUCCINATE 25 MG: 25 TABLET, EXTENDED RELEASE ORAL at 20:33

## 2025-02-27 RX ADMIN — SODIUM CHLORIDE, PRESERVATIVE FREE 10 ML: 5 INJECTION INTRAVENOUS at 08:48

## 2025-02-27 RX ADMIN — MICONAZOLE NITRATE: 20 POWDER TOPICAL at 08:48

## 2025-02-27 RX ADMIN — FUROSEMIDE 40 MG: 10 INJECTION, SOLUTION INTRAMUSCULAR; INTRAVENOUS at 17:44

## 2025-02-27 RX ADMIN — MONTELUKAST 10 MG: 10 TABLET, FILM COATED ORAL at 20:30

## 2025-02-27 RX ADMIN — LEVOTHYROXINE SODIUM 150 MCG: 75 TABLET ORAL at 06:21

## 2025-02-27 RX ADMIN — BUDESONIDE 500 MCG: 0.5 SUSPENSION RESPIRATORY (INHALATION) at 12:41

## 2025-02-27 RX ADMIN — APIXABAN 2.5 MG: 2.5 TABLET, FILM COATED ORAL at 08:48

## 2025-02-27 RX ADMIN — POTASSIUM CHLORIDE 40 MEQ: 1500 TABLET, EXTENDED RELEASE ORAL at 12:01

## 2025-02-27 RX ADMIN — ARFORMOTEROL TARTRATE 15 MCG: 15 SOLUTION RESPIRATORY (INHALATION) at 12:40

## 2025-02-27 RX ADMIN — METOPROLOL SUCCINATE 25 MG: 25 TABLET, EXTENDED RELEASE ORAL at 08:48

## 2025-02-27 RX ADMIN — BUDESONIDE 500 MCG: 0.5 SUSPENSION RESPIRATORY (INHALATION) at 20:07

## 2025-02-27 NOTE — CONSULTS
[facility]   Inpatient CHF Nurse Navigator Consult      Cardiologist: Dr Marcial Carter is a 79 y.o. (1945) female with a history of HFpEF, most recent EF:  Lab Results   Component Value Date    LVEF 65 01/23/2019       Patient was awake and alert, laying in bed during the consultation and is agreeable to heart failure education. She was engaged and asked appropriate questions throughout the education session. She is feeling a little better today.     Barriers identified during consult contributing to HF Hospitalization:  [] Limited medication adherence   [] Poor health literacy, education regarding HF medications provided   [] Pill box provided to patient  [] Difficulty affording medications  [] Difficulty obtaining/ managing medications  [] Prescription assistance information given     [x] Not weighing themselves daily  [x] Weight log provided for easy monitoring  [] Scale provided     [x] Not following low sodium diet  [] Food insecurity   [x] 2 gram sodium diet education provided   [] Low sodium recipes provided  [] Sodium free seasoning provided   [] Low sodium meal delivery options given to patient  [] Dietician consulted     [] Lack of transportation to appointments     [] Depression, given chronic illness  [] Primary team notified     [] Goals of care need addressed  [] Palliative care consulted     [] CHF CHW consulted, to assist with     Chart Reviewed:  Diet: ADULT DIET; Regular; No Added Salt (3-4 gm)   Daily Weights: Patient Vitals for the past 96 hrs (Last 3 readings):   Weight   02/19/25 1407 128.4 kg (283 lb)     I/O:   Intake/Output Summary (Last 24 hours) at 2/21/2025 0900  Last data filed at 2/21/2025 0618  Gross per 24 hour   Intake --   Output 1050 ml   Net -1050 ml       [] Nursing staff/manager notified of inaccurate elvy weights or I/O        Discharge Plan:  Above identified barriers reviewed and needs addressed    Patient/family 
Nutrition Note    Pt sound asleep at time of room visit. CHF handout left on pt's bedside table, will re attempt as able.   Method: Handout  Contact name and number provided.    Miranda D'Amico, RD, LD  Contact Number: [phone]    
reports she was told to stop taking her daily Lasix by nephrology about a month ago and has since followed up with fluid.  She reports she feels better since being in the hospital and being diuresed.  She is currently atrial fibrillation at 90 bpm.  She is still mildly hypervolemic with +1 BLE edema, diminished lung sounds with a slight expiratory wheeze.  JVD/HJR hard to establish due to patient's body habitus.    Most recent VS 97.5, 18 respirations, 76 pulse, 118/82, 94% on 2 L cannula.    [id number] 5    Intake and output: -2.5 L    Please note: past medical records were reviewed per electronic medical record (EMR) - see detailed reports under Past Medical/ Surgical History.   Past Medical History:    Mild VHD  TTE 2019: EF 65%.  Mild aortic stenosis.  Family history of premature CAD  Hypertension  Hyperlipidemia  Hypothyroidism  CKD stage 3, baseline 1.3-1.4. [facility]   Chronic AUSTIN  BLE lymphedema  Ambulatory dysfunction walks with cane  Former smoker quit 1965    Past Surgical History:    Past Surgical History:   Procedure Laterality Date    TUBAL LIGATION         Medications Prior to admit:  Prior to Admission medications    Medication Sig Start Date End Date Taking? Authorizing Provider   furosemide (LASIX) 20 MG tablet TAKE (1)ONE TABLET ONCE DAILY 1/29/20  Yes Abhinav Ceja MD   losartan-hydrochlorothiazide (HYZAAR) 100-25 MG per tablet Take 1 tablet by mouth daily 10/30/18  Yes Los Kent MD   Cholecalciferol (VITAMIN D3) 5000 units TABS Take by mouth daily   Yes Los Kent MD   Potassium Gluconate 595 (99 K) MG TABS Take by mouth daily   Yes Los Kent MD   montelukast (SINGULAIR) 10 MG tablet Take 1 tablet by mouth nightly 5/29/13  Yes Los Kent MD   Multiple Vitamins-Minerals (VISION VITAMINS PO) Take  by mouth daily.   Yes Los Kent MD   Omega-3 Fatty Acids (OMEGA 3 PO) Take 1,000 mg by mouth daily.   Yes Donte 
velocity 2.3 m/s and TENZIN 1.2 cm2. Mild TR with mild-mod PH with RVSP 49 mmHg.     Gently diurese.     BB. Eventual spirolactone and SGLT2 if renal function stabilizes.     3. VHD/PH:    Echo 2/20/2025 with LVEF 60%, mod LVH, dilate RV with low normal function, mod AS with mean gradient 14 mmHg. AV velocity 2.3 m/s and TENZIN 1.2 cm2. Mild TR with mild-mod PH with RVSP 49 mmHg.     Medically manage.     4. Elevated troponin: Echo as above.     Consider eventual ischemia evaluation. Medically manage for now.     5. Chronic AUSTIN: See above.     6. Acute on CKD: Improving. Follow labs.     7. HTN: Observe.     8. Lipids: Per primary service.     9. Hypothyroidism: On HRT.     10. Ambulatory dysfunction walks with cane    11. Former smoker    Available external charts reviewed.   Available imaging and evaluations independently reviewed.   Interviewed and discussed patient with available family.  Discussed case with referring service and non-cardiology consultants.     Dimas Sewell D.O.  Cardiologist  Cardiology, [facility]    
the urinary bladder.    2/202/5 2 D ECHO:  Left Ventricle Normal left ventricular systolic function. EF by visual approximation is 60%. Left ventricle size is normal. Moderate concentric hypertrophy. Indeterminate diastolic function.  Left Atrium Left atrium is mildly dilated.  Right Ventricle Right ventricle is dilated. Low normal systolic function. TAPSE is 1.7 cm.  Right Atrium Right atrium is dilated.  Aortic Valve Fibrocalcific changes present. No regurgitation. Moderate stenosis. AV mean gradient is 14 mmHg. AV peak velocity is 2.3 m/s. AV Velocity Ratio is 0.30. LVOT:AV VTI Index is 0.32. LVOT diameter is 2.2 cm. AV area by continuity VTI is 1.2 cm2. AV area by peak velocity is 1.2 cm2.  Mitral Valve Trace regurgitation. No stenosis noted.  Tricuspid Valve Mild regurgitation. The estimated RVSP is 49 mmHg.  Pulmonic Valve The pulmonic valve was not well visualized.  Aorta Normal sized aortic root.  Pericardium No pericardial effusion.        Assessment  1-CKD G3B with a baseline serum cr 1.6mg/dl with an e-GFR=32ml/min presumed sec to microvascular disease from HTN, dyslipidemia  PLAN:  Check UA  Check UACR  Follow Labs  Follow I/O's    2-Edema of the Bilat LE  PLAN:  Start furosemide 40mg IV tid  Follow I/O's  Follow wt's  Check US of the bilat LE  Hold the torsemide    3-Hypokalemia in the setting of the torsemide and metolazone on 2/26/24  PLAN:  Received 40meq KCl this PM  Recheck K+ this PM  Follow K+ and Mg++    4-Chronic Resp Acidosis with a compensatory Metabolic Alkalosis   PLAN:  Follow HCO3 and the ABG's    5- HTN with CKD I-IV  BP goal <130/80(ACC/AHA Target)  PLAN:  Follow BP with the diuresis    Thank you  for allowing us to participate in care of Aggiehome Mckeon MD  5:30 PM  2/27/2025

## 2025-02-27 NOTE — CARE COORDINATION
Spoke with patient and daughter Lucy - reviewed pt/ot scores.  DC plan modified with request for snf/omar referral.  Choices reviewed - selection of [facility]  Referral called to Rosa with [facility].  Will await her review and response regarding bed availability/acceptance.  Patient would require authorization prior to transfer to any accepting facility.  Patient will need followed and assisted with discharge planning as necessary.   Tushar Jaramillo, MSN RN  [facility] Case Management  [phone]

## 2025-02-28 ENCOUNTER — APPOINTMENT (OUTPATIENT)
Dept: ULTRASOUND IMAGING | Age: 80
DRG: 291 | End: 2025-02-28
Payer: MEDICARE

## 2025-02-28 PROBLEM — I12.9 BENIGN HYPERTENSIVE KIDNEY DISEASE: Status: ACTIVE | Noted: 2025-01-10

## 2025-02-28 PROBLEM — N18.32 STAGE 3B CHRONIC KIDNEY DISEASE (HCC): Status: ACTIVE | Noted: 2025-01-10

## 2025-02-28 PROBLEM — H35.3130 BILATERAL NONEXUDATIVE AGE-RELATED MACULAR DEGENERATION: Status: ACTIVE | Noted: 2023-05-05

## 2025-02-28 LAB
ALBUMIN SERPL-MCNC: 3.6 G/DL (ref 3.5–5.2)
ALP SERPL-CCNC: 65 U/L (ref 35–104)
ALT SERPL-CCNC: 9 U/L (ref 0–32)
ANION GAP SERPL CALCULATED.3IONS-SCNC: 7 MMOL/L (ref 7–16)
AST SERPL-CCNC: 10 U/L (ref 0–31)
B.E.: 18.4 MMOL/L (ref -3–3)
BILIRUB SERPL-MCNC: 0.7 MG/DL (ref 0–1.2)
BILIRUB UR QL STRIP: NEGATIVE
BUN SERPL-MCNC: 51 MG/DL (ref 6–23)
CALCIUM SERPL-MCNC: 9.8 MG/DL (ref 8.6–10.2)
CHLORIDE SERPL-SCNC: 90 MMOL/L (ref 98–107)
CLARITY UR: CLEAR
CO2 SERPL-SCNC: 46 MMOL/L (ref 22–29)
COHB: 2 % (ref 0–1.5)
COLOR UR: YELLOW
COMMENT: NORMAL
CREAT SERPL-MCNC: 1.7 MG/DL (ref 0.5–1)
CREAT UR-MCNC: 69.6 MG/DL (ref 29–226)
CRITICAL: ABNORMAL
DATE ANALYZED: ABNORMAL
DATE OF COLLECTION: ABNORMAL
ERYTHROCYTE [DISTWIDTH] IN BLOOD BY AUTOMATED COUNT: 16.2 % (ref 11.5–15)
GFR, ESTIMATED: 30 ML/MIN/1.73M2
GLUCOSE BLD-MCNC: 117 MG/DL (ref 74–99)
GLUCOSE SERPL-MCNC: 116 MG/DL (ref 74–99)
GLUCOSE UR STRIP-MCNC: NEGATIVE MG/DL
HCO3: 48.8 MMOL/L (ref 22–26)
HCT VFR BLD AUTO: 44.9 % (ref 34–48)
HGB BLD-MCNC: 13 G/DL (ref 11.5–15.5)
HGB UR QL STRIP.AUTO: NEGATIVE
HHB: 8.4 % (ref 0–5)
KETONES UR STRIP-MCNC: NEGATIVE MG/DL
LAB: ABNORMAL
LEUKOCYTE ESTERASE UR QL STRIP: NEGATIVE
Lab: 748
MCH RBC QN AUTO: 27.3 PG (ref 26–35)
MCHC RBC AUTO-ENTMCNC: 29 G/DL (ref 32–34.5)
MCV RBC AUTO: 94.1 FL (ref 80–99.9)
METHB: 0.3 % (ref 0–1.5)
MODE: ABNORMAL
NITRITE UR QL STRIP: NEGATIVE
O2 CONTENT: 17.3 ML/DL
O2 SATURATION: 91.4 % (ref 92–98.5)
O2HB: 89.3 % (ref 94–97)
OPERATOR ID: 8634
PATIENT TEMP: 37 C
PCO2: 89.4 MMHG (ref 35–45)
PH BLOOD GAS: 7.36 (ref 7.35–7.45)
PH UR STRIP: 6 [PH] (ref 5–8)
PLATELET # BLD AUTO: 107 K/UL (ref 130–450)
PMV BLD AUTO: 12.5 FL (ref 7–12)
PO2: 63.9 MMHG (ref 75–100)
POTASSIUM SERPL-SCNC: 3 MMOL/L (ref 3.5–5)
POTASSIUM SERPL-SCNC: 3.7 MMOL/L (ref 3.5–5)
PROT SERPL-MCNC: 6.6 G/DL (ref 6.4–8.3)
PROT UR STRIP-MCNC: NEGATIVE MG/DL
RBC # BLD AUTO: 4.77 M/UL (ref 3.5–5.5)
SODIUM SERPL-SCNC: 143 MMOL/L (ref 132–146)
SOURCE, BLOOD GAS: ABNORMAL
SP GR UR STRIP: 1.01 (ref 1–1.03)
THB: 13.8 G/DL (ref 11.5–16.5)
TIME ANALYZED: 751
TOTAL PROTEIN, URINE: 7 MG/DL (ref 0–12)
URATE SERPL-MCNC: 10.3 MG/DL (ref 2.4–5.7)
URINE TOTAL PROTEIN CREATININE RATIO: 0.11 (ref 0–0.2)
UROBILINOGEN UR STRIP-ACNC: 0.2 EU/DL (ref 0–1)
WBC OTHER # BLD: 10.5 K/UL (ref 4.5–11.5)

## 2025-02-28 PROCEDURE — 2700000000 HC OXYGEN THERAPY PER DAY

## 2025-02-28 PROCEDURE — 82962 GLUCOSE BLOOD TEST: CPT

## 2025-02-28 PROCEDURE — 94640 AIRWAY INHALATION TREATMENT: CPT

## 2025-02-28 PROCEDURE — 6370000000 HC RX 637 (ALT 250 FOR IP): Performed by: NURSE PRACTITIONER

## 2025-02-28 PROCEDURE — 36600 WITHDRAWAL OF ARTERIAL BLOOD: CPT

## 2025-02-28 PROCEDURE — 2500000003 HC RX 250 WO HCPCS: Performed by: NURSE PRACTITIONER

## 2025-02-28 PROCEDURE — 84132 ASSAY OF SERUM POTASSIUM: CPT

## 2025-02-28 PROCEDURE — 99233 SBSQ HOSP IP/OBS HIGH 50: CPT | Performed by: INTERNAL MEDICINE

## 2025-02-28 PROCEDURE — 82570 ASSAY OF URINE CREATININE: CPT

## 2025-02-28 PROCEDURE — P9047 ALBUMIN (HUMAN), 25%, 50ML: HCPCS

## 2025-02-28 PROCEDURE — 81003 URINALYSIS AUTO W/O SCOPE: CPT

## 2025-02-28 PROCEDURE — 6360000002 HC RX W HCPCS

## 2025-02-28 PROCEDURE — 82805 BLOOD GASES W/O2 SATURATION: CPT

## 2025-02-28 PROCEDURE — 80053 COMPREHEN METABOLIC PANEL: CPT

## 2025-02-28 PROCEDURE — 6360000002 HC RX W HCPCS: Performed by: STUDENT IN AN ORGANIZED HEALTH CARE EDUCATION/TRAINING PROGRAM

## 2025-02-28 PROCEDURE — 2060000000 HC ICU INTERMEDIATE R&B

## 2025-02-28 PROCEDURE — 84550 ASSAY OF BLOOD/URIC ACID: CPT

## 2025-02-28 PROCEDURE — 84156 ASSAY OF PROTEIN URINE: CPT

## 2025-02-28 PROCEDURE — 94660 CPAP INITIATION&MGMT: CPT

## 2025-02-28 PROCEDURE — 6370000000 HC RX 637 (ALT 250 FOR IP): Performed by: STUDENT IN AN ORGANIZED HEALTH CARE EDUCATION/TRAINING PROGRAM

## 2025-02-28 PROCEDURE — 85027 COMPLETE CBC AUTOMATED: CPT

## 2025-02-28 PROCEDURE — 6370000000 HC RX 637 (ALT 250 FOR IP): Performed by: INTERNAL MEDICINE

## 2025-02-28 PROCEDURE — 93970 EXTREMITY STUDY: CPT

## 2025-02-28 PROCEDURE — 6360000002 HC RX W HCPCS: Performed by: INTERNAL MEDICINE

## 2025-02-28 RX ORDER — POTASSIUM CHLORIDE 7.45 MG/ML
10 INJECTION INTRAVENOUS
Status: COMPLETED | OUTPATIENT
Start: 2025-02-28 | End: 2025-02-28

## 2025-02-28 RX ORDER — ALBUMIN (HUMAN) 12.5 G/50ML
25 SOLUTION INTRAVENOUS ONCE
Status: COMPLETED | OUTPATIENT
Start: 2025-02-28 | End: 2025-03-01

## 2025-02-28 RX ORDER — MIDODRINE HYDROCHLORIDE 5 MG/1
10 TABLET ORAL ONCE
Status: COMPLETED | OUTPATIENT
Start: 2025-02-28 | End: 2025-02-28

## 2025-02-28 RX ORDER — ALBUMIN (HUMAN) 12.5 G/50ML
SOLUTION INTRAVENOUS
Status: COMPLETED
Start: 2025-02-28 | End: 2025-03-01

## 2025-02-28 RX ORDER — POTASSIUM CHLORIDE 1500 MG/1
40 TABLET, EXTENDED RELEASE ORAL ONCE
Status: COMPLETED | OUTPATIENT
Start: 2025-02-28 | End: 2025-02-28

## 2025-02-28 RX ORDER — FUROSEMIDE 10 MG/ML
40 INJECTION INTRAMUSCULAR; INTRAVENOUS 2 TIMES DAILY
Status: DISCONTINUED | OUTPATIENT
Start: 2025-02-28 | End: 2025-03-02

## 2025-02-28 RX ADMIN — LEVOTHYROXINE SODIUM 150 MCG: 75 TABLET ORAL at 05:02

## 2025-02-28 RX ADMIN — APIXABAN 2.5 MG: 2.5 TABLET, FILM COATED ORAL at 09:18

## 2025-02-28 RX ADMIN — MIDODRINE HYDROCHLORIDE 10 MG: 5 TABLET ORAL at 22:15

## 2025-02-28 RX ADMIN — POTASSIUM CHLORIDE 10 MEQ: 7.46 INJECTION, SOLUTION INTRAVENOUS at 10:24

## 2025-02-28 RX ADMIN — SODIUM CHLORIDE, PRESERVATIVE FREE 10 ML: 5 INJECTION INTRAVENOUS at 22:01

## 2025-02-28 RX ADMIN — FUROSEMIDE 40 MG: 10 INJECTION, SOLUTION INTRAMUSCULAR; INTRAVENOUS at 00:25

## 2025-02-28 RX ADMIN — ARFORMOTEROL TARTRATE 15 MCG: 15 SOLUTION RESPIRATORY (INHALATION) at 10:03

## 2025-02-28 RX ADMIN — POTASSIUM CHLORIDE 10 MEQ: 7.46 INJECTION, SOLUTION INTRAVENOUS at 09:28

## 2025-02-28 RX ADMIN — SODIUM CHLORIDE, PRESERVATIVE FREE 10 ML: 5 INJECTION INTRAVENOUS at 09:28

## 2025-02-28 RX ADMIN — MONTELUKAST 10 MG: 10 TABLET, FILM COATED ORAL at 22:00

## 2025-02-28 RX ADMIN — MICONAZOLE NITRATE: 20 POWDER TOPICAL at 22:01

## 2025-02-28 RX ADMIN — POTASSIUM CHLORIDE 40 MEQ: 1500 TABLET, EXTENDED RELEASE ORAL at 10:24

## 2025-02-28 RX ADMIN — APIXABAN 2.5 MG: 2.5 TABLET, FILM COATED ORAL at 22:00

## 2025-02-28 RX ADMIN — BUDESONIDE 500 MCG: 0.5 SUSPENSION RESPIRATORY (INHALATION) at 10:03

## 2025-02-28 RX ADMIN — AMIODARONE HYDROCHLORIDE 200 MG: 200 TABLET ORAL at 09:18

## 2025-02-28 RX ADMIN — MICONAZOLE NITRATE: 20 POWDER TOPICAL at 09:28

## 2025-02-28 RX ADMIN — ALBUMIN (HUMAN) 25 G: 0.25 INJECTION, SOLUTION INTRAVENOUS at 22:19

## 2025-02-28 RX ADMIN — ALBUMIN (HUMAN) 25 G: 12.5 SOLUTION INTRAVENOUS at 22:19

## 2025-02-28 ASSESSMENT — PAIN SCALES - GENERAL: PAINLEVEL_OUTOF10: 0

## 2025-02-28 NOTE — CARE COORDINATION
Received notification from Rosa at [facility] that facility has accepted patient and has received authorization from [employer] Medicare.  Facility can accept patient for SNF stay upon discharge, inclusive of weekend.  If weekend discharge does occur, staff to call facility direct at [phone].  Non emergency transportation will need arranged.  NAINA initiated, envelope completed with demos, transport forms and [id number].  Tushar Jaramillo MSN RN  [facility] Case Management  [phone]

## 2025-02-28 NOTE — DISCHARGE INSTR - COC
Continuity of Care Form    Patient Name: Aggie Carter   :  1945  MRN:  [id number]    Admit date:  2025  Discharge date:  ***    Code Status Order: Full Code   Advance Directives:   Advance Care Flowsheet Documentation        Date/Time Healthcare Directive Type of Healthcare Directive Copy in Chart Healthcare Agent Appointed Healthcare Agent's Name Healthcare Agent's Phone Number    [phone] No, patient does not have an advance directive for healthcare treatment  --  --  --  --  --                     Admitting Physician:  Suyapa Hummel MD  PCP: Fuentes Herrera MD    Discharging Nurse: ***  Discharging Hospital Unit/Room#: 0415/0415-A  Discharging Unit Phone Number: ***    Emergency Contact:   Extended Emergency Contact Information  Primary Emergency Contact: Lucy Chow  Home Phone: [phone]  Mobile Phone: [phone]  Relation: Step Child   needed? No  Secondary Emergency Contact: KaiseralexaMaylin  Home Phone: [phone]  Relation: Other    Past Surgical History:  Past Surgical History:   Procedure Laterality Date    TUBAL LIGATION         Immunization History:     There is no immunization history on file for this patient.    Active Problems:  Patient Active Problem List   Diagnosis Code    Exertional dyspnea R06.09    Leg edema R60.0    DM (diabetes mellitus) (HCC) E11.9    HTN (hypertension) I10    HLD (hyperlipidemia) E78.5    Hypothyroidism E03.9    Acute respiratory failure with hypoxia (HCC) J96.01    Volume overload E87.70    JARED (acute kidney injury) N17.9    Nonrheumatic aortic valve stenosis I35.0    New onset atrial fibrillation (HCC) I48.91    Congestive heart failure (HCC) I50.9       Isolation/Infection:   Isolation            No Isolation          Patient Infection Status       None to display            Nurse Assessment:  Last Vital Signs: BP (!) 84/45   Pulse 92   Temp 97.2 °F (36.2 °C) (Temporal)   Resp 16   Ht 1.651 m (5' 5\")   Wt 131.1 kg (289 lb 0.4

## 2025-03-01 LAB
ALBUMIN SERPL-MCNC: 3.4 G/DL (ref 3.5–5.2)
ALP SERPL-CCNC: 57 U/L (ref 35–104)
ALT SERPL-CCNC: 8 U/L (ref 0–32)
ANION GAP SERPL CALCULATED.3IONS-SCNC: 8 MMOL/L (ref 7–16)
AST SERPL-CCNC: 10 U/L (ref 0–31)
B.E.: 19.1 MMOL/L (ref -3–3)
BILIRUB SERPL-MCNC: 0.7 MG/DL (ref 0–1.2)
BUN SERPL-MCNC: 53 MG/DL (ref 6–23)
CALCIUM SERPL-MCNC: 9.6 MG/DL (ref 8.6–10.2)
CHLORIDE SERPL-SCNC: 91 MMOL/L (ref 98–107)
CO2 SERPL-SCNC: 44 MMOL/L (ref 22–29)
COHB: 1.7 % (ref 0–1.5)
COMMENT: ABNORMAL
CREAT SERPL-MCNC: 1.5 MG/DL (ref 0.5–1)
CRITICAL: ABNORMAL
DATE ANALYZED: ABNORMAL
DATE OF COLLECTION: ABNORMAL
ERYTHROCYTE [DISTWIDTH] IN BLOOD BY AUTOMATED COUNT: 16.3 % (ref 11.5–15)
GFR, ESTIMATED: 34 ML/MIN/1.73M2
GLUCOSE SERPL-MCNC: 120 MG/DL (ref 74–99)
HCO3: 49.8 MMOL/L (ref 22–26)
HCT VFR BLD AUTO: 42.8 % (ref 34–48)
HGB BLD-MCNC: 12.6 G/DL (ref 11.5–15.5)
HHB: 4.3 % (ref 0–5)
LAB: ABNORMAL
Lab: 645
MAGNESIUM SERPL-MCNC: 2.1 MG/DL (ref 1.6–2.6)
MCH RBC QN AUTO: 27.3 PG (ref 26–35)
MCHC RBC AUTO-ENTMCNC: 29.4 G/DL (ref 32–34.5)
MCV RBC AUTO: 92.8 FL (ref 80–99.9)
METHB: 0.3 % (ref 0–1.5)
MODE: ABNORMAL
O2 CONTENT: 17.8 ML/DL
O2 SATURATION: 95.6 % (ref 92–98.5)
O2HB: 93.7 % (ref 94–97)
OPERATOR ID: ABNORMAL
PATIENT TEMP: 37 C
PCO2: 92.2 MMHG (ref 35–45)
PH BLOOD GAS: 7.35 (ref 7.35–7.45)
PHOSPHATE SERPL-MCNC: 3.4 MG/DL (ref 2.5–4.5)
PLATELET, FLUORESCENCE: 111 K/UL (ref 130–450)
PMV BLD AUTO: 12.3 FL (ref 7–12)
PO2: 81.1 MMHG (ref 75–100)
POTASSIUM SERPL-SCNC: 3.5 MMOL/L (ref 3.5–5)
PROT SERPL-MCNC: 6.3 G/DL (ref 6.4–8.3)
RBC # BLD AUTO: 4.61 M/UL (ref 3.5–5.5)
SODIUM SERPL-SCNC: 143 MMOL/L (ref 132–146)
SOURCE, BLOOD GAS: ABNORMAL
THB: 13.5 G/DL (ref 11.5–16.5)
TIME ANALYZED: 647
WBC OTHER # BLD: 8.3 K/UL (ref 4.5–11.5)

## 2025-03-01 PROCEDURE — 36415 COLL VENOUS BLD VENIPUNCTURE: CPT

## 2025-03-01 PROCEDURE — 6370000000 HC RX 637 (ALT 250 FOR IP): Performed by: INTERNAL MEDICINE

## 2025-03-01 PROCEDURE — 2060000000 HC ICU INTERMEDIATE R&B

## 2025-03-01 PROCEDURE — 80053 COMPREHEN METABOLIC PANEL: CPT

## 2025-03-01 PROCEDURE — 6370000000 HC RX 637 (ALT 250 FOR IP): Performed by: STUDENT IN AN ORGANIZED HEALTH CARE EDUCATION/TRAINING PROGRAM

## 2025-03-01 PROCEDURE — 2500000003 HC RX 250 WO HCPCS: Performed by: NURSE PRACTITIONER

## 2025-03-01 PROCEDURE — 84100 ASSAY OF PHOSPHORUS: CPT

## 2025-03-01 PROCEDURE — 6360000002 HC RX W HCPCS: Performed by: STUDENT IN AN ORGANIZED HEALTH CARE EDUCATION/TRAINING PROGRAM

## 2025-03-01 PROCEDURE — 82805 BLOOD GASES W/O2 SATURATION: CPT

## 2025-03-01 PROCEDURE — 85027 COMPLETE CBC AUTOMATED: CPT

## 2025-03-01 PROCEDURE — 6370000000 HC RX 637 (ALT 250 FOR IP): Performed by: NURSE PRACTITIONER

## 2025-03-01 PROCEDURE — 83735 ASSAY OF MAGNESIUM: CPT

## 2025-03-01 PROCEDURE — 94660 CPAP INITIATION&MGMT: CPT

## 2025-03-01 PROCEDURE — 2700000000 HC OXYGEN THERAPY PER DAY

## 2025-03-01 PROCEDURE — 6360000002 HC RX W HCPCS: Performed by: INTERNAL MEDICINE

## 2025-03-01 PROCEDURE — 94640 AIRWAY INHALATION TREATMENT: CPT

## 2025-03-01 RX ADMIN — BUDESONIDE 500 MCG: 0.5 SUSPENSION RESPIRATORY (INHALATION) at 10:01

## 2025-03-01 RX ADMIN — AMIODARONE HYDROCHLORIDE 200 MG: 200 TABLET ORAL at 08:06

## 2025-03-01 RX ADMIN — METOPROLOL SUCCINATE 25 MG: 25 TABLET, EXTENDED RELEASE ORAL at 08:06

## 2025-03-01 RX ADMIN — POTASSIUM BICARBONATE 40 MEQ: 782 TABLET, EFFERVESCENT ORAL at 09:26

## 2025-03-01 RX ADMIN — AMIODARONE HYDROCHLORIDE 200 MG: 200 TABLET ORAL at 00:24

## 2025-03-01 RX ADMIN — ARFORMOTEROL TARTRATE 15 MCG: 15 SOLUTION RESPIRATORY (INHALATION) at 10:01

## 2025-03-01 RX ADMIN — SODIUM CHLORIDE, PRESERVATIVE FREE 10 ML: 5 INJECTION INTRAVENOUS at 08:06

## 2025-03-01 RX ADMIN — MICONAZOLE NITRATE: 20 POWDER TOPICAL at 20:15

## 2025-03-01 RX ADMIN — FUROSEMIDE 40 MG: 10 INJECTION, SOLUTION INTRAMUSCULAR; INTRAVENOUS at 09:25

## 2025-03-01 RX ADMIN — MONTELUKAST 10 MG: 10 TABLET, FILM COATED ORAL at 20:14

## 2025-03-01 RX ADMIN — BUDESONIDE 500 MCG: 0.5 SUSPENSION RESPIRATORY (INHALATION) at 18:51

## 2025-03-01 RX ADMIN — APIXABAN 2.5 MG: 2.5 TABLET, FILM COATED ORAL at 20:15

## 2025-03-01 RX ADMIN — METOPROLOL SUCCINATE 25 MG: 25 TABLET, EXTENDED RELEASE ORAL at 20:14

## 2025-03-01 RX ADMIN — AMIODARONE HYDROCHLORIDE 200 MG: 200 TABLET ORAL at 20:14

## 2025-03-01 RX ADMIN — LEVOTHYROXINE SODIUM 150 MCG: 75 TABLET ORAL at 06:13

## 2025-03-01 RX ADMIN — MICONAZOLE NITRATE: 20 POWDER TOPICAL at 08:07

## 2025-03-01 RX ADMIN — APIXABAN 2.5 MG: 2.5 TABLET, FILM COATED ORAL at 08:06

## 2025-03-01 RX ADMIN — SODIUM CHLORIDE, PRESERVATIVE FREE 10 ML: 5 INJECTION INTRAVENOUS at 20:15

## 2025-03-01 RX ADMIN — ARFORMOTEROL TARTRATE 15 MCG: 15 SOLUTION RESPIRATORY (INHALATION) at 18:51

## 2025-03-01 NOTE — SIGNIFICANT EVENT
RRT called at 22:05 by cardiology for hypotension. By the time pt was evaluated SBP already improved to 100 without intervention. IV 25% Albumin 25 g x 1 ordered. Will monitor peripherally overnight.     Dr. Abhinav Rizvi D.O.  [facility] Medicine Team, Nocturnist

## 2025-03-02 LAB
ALBUMIN SERPL-MCNC: 3.4 G/DL (ref 3.5–5.2)
ALP SERPL-CCNC: 69 U/L (ref 35–104)
ALT SERPL-CCNC: 10 U/L (ref 0–32)
ANION GAP SERPL CALCULATED.3IONS-SCNC: 4 MMOL/L (ref 7–16)
AST SERPL-CCNC: 10 U/L (ref 0–31)
BILIRUB SERPL-MCNC: 0.5 MG/DL (ref 0–1.2)
BUN SERPL-MCNC: 59 MG/DL (ref 6–23)
CALCIUM SERPL-MCNC: 9.7 MG/DL (ref 8.6–10.2)
CHLORIDE SERPL-SCNC: 91 MMOL/L (ref 98–107)
CO2 SERPL-SCNC: 47 MMOL/L (ref 22–29)
CREAT SERPL-MCNC: 1.6 MG/DL (ref 0.5–1)
ERYTHROCYTE [DISTWIDTH] IN BLOOD BY AUTOMATED COUNT: 16.4 % (ref 11.5–15)
GFR, ESTIMATED: 32 ML/MIN/1.73M2
GLUCOSE SERPL-MCNC: 110 MG/DL (ref 74–99)
HCT VFR BLD AUTO: 43.8 % (ref 34–48)
HGB BLD-MCNC: 12.3 G/DL (ref 11.5–15.5)
MAGNESIUM SERPL-MCNC: 2.3 MG/DL (ref 1.6–2.6)
MCH RBC QN AUTO: 26.6 PG (ref 26–35)
MCHC RBC AUTO-ENTMCNC: 28.1 G/DL (ref 32–34.5)
MCV RBC AUTO: 94.8 FL (ref 80–99.9)
PHOSPHATE SERPL-MCNC: 3.4 MG/DL (ref 2.5–4.5)
PLATELET # BLD AUTO: 114 K/UL (ref 130–450)
PMV BLD AUTO: 12.8 FL (ref 7–12)
POTASSIUM SERPL-SCNC: 3.4 MMOL/L (ref 3.5–5)
PROT SERPL-MCNC: 6.4 G/DL (ref 6.4–8.3)
RBC # BLD AUTO: 4.62 M/UL (ref 3.5–5.5)
SODIUM SERPL-SCNC: 142 MMOL/L (ref 132–146)
WBC OTHER # BLD: 6.8 K/UL (ref 4.5–11.5)

## 2025-03-02 PROCEDURE — 6360000002 HC RX W HCPCS: Performed by: STUDENT IN AN ORGANIZED HEALTH CARE EDUCATION/TRAINING PROGRAM

## 2025-03-02 PROCEDURE — 6370000000 HC RX 637 (ALT 250 FOR IP): Performed by: INTERNAL MEDICINE

## 2025-03-02 PROCEDURE — 94660 CPAP INITIATION&MGMT: CPT

## 2025-03-02 PROCEDURE — 6360000002 HC RX W HCPCS: Performed by: INTERNAL MEDICINE

## 2025-03-02 PROCEDURE — 83735 ASSAY OF MAGNESIUM: CPT

## 2025-03-02 PROCEDURE — 80053 COMPREHEN METABOLIC PANEL: CPT

## 2025-03-02 PROCEDURE — 6370000000 HC RX 637 (ALT 250 FOR IP): Performed by: NURSE PRACTITIONER

## 2025-03-02 PROCEDURE — 36415 COLL VENOUS BLD VENIPUNCTURE: CPT

## 2025-03-02 PROCEDURE — 94640 AIRWAY INHALATION TREATMENT: CPT

## 2025-03-02 PROCEDURE — 84100 ASSAY OF PHOSPHORUS: CPT

## 2025-03-02 PROCEDURE — 2700000000 HC OXYGEN THERAPY PER DAY

## 2025-03-02 PROCEDURE — 6370000000 HC RX 637 (ALT 250 FOR IP): Performed by: STUDENT IN AN ORGANIZED HEALTH CARE EDUCATION/TRAINING PROGRAM

## 2025-03-02 PROCEDURE — 85027 COMPLETE CBC AUTOMATED: CPT

## 2025-03-02 PROCEDURE — 2500000003 HC RX 250 WO HCPCS: Performed by: NURSE PRACTITIONER

## 2025-03-02 PROCEDURE — 2060000000 HC ICU INTERMEDIATE R&B

## 2025-03-02 RX ORDER — FUROSEMIDE 40 MG/1
40 TABLET ORAL 2 TIMES DAILY
Status: DISCONTINUED | OUTPATIENT
Start: 2025-03-02 | End: 2025-03-04 | Stop reason: HOSPADM

## 2025-03-02 RX ADMIN — APIXABAN 2.5 MG: 2.5 TABLET, FILM COATED ORAL at 21:20

## 2025-03-02 RX ADMIN — SODIUM CHLORIDE, PRESERVATIVE FREE 10 ML: 5 INJECTION INTRAVENOUS at 21:20

## 2025-03-02 RX ADMIN — AMIODARONE HYDROCHLORIDE 200 MG: 200 TABLET ORAL at 08:27

## 2025-03-02 RX ADMIN — APIXABAN 2.5 MG: 2.5 TABLET, FILM COATED ORAL at 08:27

## 2025-03-02 RX ADMIN — ARFORMOTEROL TARTRATE 15 MCG: 15 SOLUTION RESPIRATORY (INHALATION) at 09:10

## 2025-03-02 RX ADMIN — MICONAZOLE NITRATE: 20 POWDER TOPICAL at 21:20

## 2025-03-02 RX ADMIN — BUDESONIDE 500 MCG: 0.5 SUSPENSION RESPIRATORY (INHALATION) at 18:51

## 2025-03-02 RX ADMIN — METOPROLOL SUCCINATE 25 MG: 25 TABLET, EXTENDED RELEASE ORAL at 21:20

## 2025-03-02 RX ADMIN — POTASSIUM CHLORIDE 40 MEQ: 1500 TABLET, EXTENDED RELEASE ORAL at 08:28

## 2025-03-02 RX ADMIN — MONTELUKAST 10 MG: 10 TABLET, FILM COATED ORAL at 21:20

## 2025-03-02 RX ADMIN — SODIUM CHLORIDE, PRESERVATIVE FREE 10 ML: 5 INJECTION INTRAVENOUS at 08:27

## 2025-03-02 RX ADMIN — FUROSEMIDE 40 MG: 40 TABLET ORAL at 15:42

## 2025-03-02 RX ADMIN — ARFORMOTEROL TARTRATE 15 MCG: 15 SOLUTION RESPIRATORY (INHALATION) at 18:52

## 2025-03-02 RX ADMIN — AMIODARONE HYDROCHLORIDE 200 MG: 200 TABLET ORAL at 21:20

## 2025-03-02 RX ADMIN — BUDESONIDE 500 MCG: 0.5 SUSPENSION RESPIRATORY (INHALATION) at 09:10

## 2025-03-02 RX ADMIN — LEVOTHYROXINE SODIUM 150 MCG: 75 TABLET ORAL at 06:32

## 2025-03-02 RX ADMIN — METOPROLOL SUCCINATE 25 MG: 25 TABLET, EXTENDED RELEASE ORAL at 08:27

## 2025-03-02 RX ADMIN — MICONAZOLE NITRATE: 20 POWDER TOPICAL at 08:32

## 2025-03-02 RX ADMIN — FUROSEMIDE 40 MG: 10 INJECTION, SOLUTION INTRAMUSCULAR; INTRAVENOUS at 08:27

## 2025-03-03 VITALS
OXYGEN SATURATION: 97 % | SYSTOLIC BLOOD PRESSURE: 121 MMHG | HEART RATE: 100 BPM | RESPIRATION RATE: 18 BRPM | BODY MASS INDEX: 46.65 KG/M2 | WEIGHT: 279.98 LBS | TEMPERATURE: 97.1 F | DIASTOLIC BLOOD PRESSURE: 58 MMHG | HEIGHT: 65 IN

## 2025-03-03 PROBLEM — J96.01 ACUTE RESPIRATORY FAILURE WITH HYPOXIA (HCC): Status: RESOLVED | Noted: 2025-02-19 | Resolved: 2025-03-03

## 2025-03-03 PROBLEM — I50.9 CONGESTIVE HEART FAILURE (HCC): Status: RESOLVED | Noted: 2025-02-22 | Resolved: 2025-03-03

## 2025-03-03 PROBLEM — N17.9 AKI (ACUTE KIDNEY INJURY): Status: RESOLVED | Noted: 2025-02-20 | Resolved: 2025-03-03

## 2025-03-03 PROBLEM — E87.70 VOLUME OVERLOAD: Status: RESOLVED | Noted: 2025-02-20 | Resolved: 2025-03-03

## 2025-03-03 LAB
ALBUMIN SERPL-MCNC: 3 G/DL (ref 3.5–5.2)
ALP SERPL-CCNC: 64 U/L (ref 35–104)
ALT SERPL-CCNC: 9 U/L (ref 0–32)
ANION GAP SERPL CALCULATED.3IONS-SCNC: 7 MMOL/L (ref 7–16)
AST SERPL-CCNC: 13 U/L (ref 0–31)
BILIRUB SERPL-MCNC: 0.5 MG/DL (ref 0–1.2)
BUN SERPL-MCNC: 59 MG/DL (ref 6–23)
CALCIUM SERPL-MCNC: 9.6 MG/DL (ref 8.6–10.2)
CHLORIDE SERPL-SCNC: 91 MMOL/L (ref 98–107)
CO2 SERPL-SCNC: 42 MMOL/L (ref 22–29)
CREAT SERPL-MCNC: 1.5 MG/DL (ref 0.5–1)
GFR, ESTIMATED: 34 ML/MIN/1.73M2
GLUCOSE SERPL-MCNC: 98 MG/DL (ref 74–99)
MAGNESIUM SERPL-MCNC: 2.6 MG/DL (ref 1.6–2.6)
PHOSPHATE SERPL-MCNC: 3.5 MG/DL (ref 2.5–4.5)
POTASSIUM SERPL-SCNC: 4.1 MMOL/L (ref 3.5–5)
PROT SERPL-MCNC: 6.6 G/DL (ref 6.4–8.3)
SODIUM SERPL-SCNC: 140 MMOL/L (ref 132–146)

## 2025-03-03 PROCEDURE — 83735 ASSAY OF MAGNESIUM: CPT

## 2025-03-03 PROCEDURE — 6370000000 HC RX 637 (ALT 250 FOR IP): Performed by: STUDENT IN AN ORGANIZED HEALTH CARE EDUCATION/TRAINING PROGRAM

## 2025-03-03 PROCEDURE — 80053 COMPREHEN METABOLIC PANEL: CPT

## 2025-03-03 PROCEDURE — 6370000000 HC RX 637 (ALT 250 FOR IP): Performed by: INTERNAL MEDICINE

## 2025-03-03 PROCEDURE — 94660 CPAP INITIATION&MGMT: CPT

## 2025-03-03 PROCEDURE — 2700000000 HC OXYGEN THERAPY PER DAY

## 2025-03-03 PROCEDURE — 94640 AIRWAY INHALATION TREATMENT: CPT

## 2025-03-03 PROCEDURE — 97530 THERAPEUTIC ACTIVITIES: CPT

## 2025-03-03 PROCEDURE — 84100 ASSAY OF PHOSPHORUS: CPT

## 2025-03-03 PROCEDURE — 36415 COLL VENOUS BLD VENIPUNCTURE: CPT

## 2025-03-03 PROCEDURE — 97535 SELF CARE MNGMENT TRAINING: CPT

## 2025-03-03 PROCEDURE — 2500000003 HC RX 250 WO HCPCS: Performed by: NURSE PRACTITIONER

## 2025-03-03 PROCEDURE — 6360000002 HC RX W HCPCS: Performed by: STUDENT IN AN ORGANIZED HEALTH CARE EDUCATION/TRAINING PROGRAM

## 2025-03-03 PROCEDURE — 6370000000 HC RX 637 (ALT 250 FOR IP): Performed by: NURSE PRACTITIONER

## 2025-03-03 RX ORDER — METOPROLOL SUCCINATE 25 MG/1
25 TABLET, EXTENDED RELEASE ORAL 2 TIMES DAILY
Qty: 30 TABLET | Refills: 3 | Status: ON HOLD
Start: 2025-03-03

## 2025-03-03 RX ORDER — FUROSEMIDE 20 MG/1
40 TABLET ORAL 2 TIMES DAILY
Qty: 30 TABLET | Refills: 11 | Status: ON HOLD
Start: 2025-03-03

## 2025-03-03 RX ORDER — AMIODARONE HYDROCHLORIDE 200 MG/1
200 TABLET ORAL 2 TIMES DAILY
Qty: 60 TABLET | Refills: 0 | Status: ON HOLD
Start: 2025-03-03 | End: 2025-04-02

## 2025-03-03 RX ADMIN — FUROSEMIDE 40 MG: 40 TABLET ORAL at 15:59

## 2025-03-03 RX ADMIN — LEVOTHYROXINE SODIUM 150 MCG: 75 TABLET ORAL at 06:37

## 2025-03-03 RX ADMIN — MICONAZOLE NITRATE: 20 POWDER TOPICAL at 21:42

## 2025-03-03 RX ADMIN — BUDESONIDE 500 MCG: 0.5 SUSPENSION RESPIRATORY (INHALATION) at 18:53

## 2025-03-03 RX ADMIN — AMIODARONE HYDROCHLORIDE 200 MG: 200 TABLET ORAL at 08:43

## 2025-03-03 RX ADMIN — APIXABAN 2.5 MG: 2.5 TABLET, FILM COATED ORAL at 08:43

## 2025-03-03 RX ADMIN — FUROSEMIDE 40 MG: 40 TABLET ORAL at 08:43

## 2025-03-03 RX ADMIN — MICONAZOLE NITRATE: 20 POWDER TOPICAL at 08:43

## 2025-03-03 RX ADMIN — METOPROLOL SUCCINATE 25 MG: 25 TABLET, EXTENDED RELEASE ORAL at 21:42

## 2025-03-03 RX ADMIN — MONTELUKAST 10 MG: 10 TABLET, FILM COATED ORAL at 21:42

## 2025-03-03 RX ADMIN — ARFORMOTEROL TARTRATE 15 MCG: 15 SOLUTION RESPIRATORY (INHALATION) at 18:53

## 2025-03-03 RX ADMIN — SODIUM CHLORIDE, PRESERVATIVE FREE 10 ML: 5 INJECTION INTRAVENOUS at 08:43

## 2025-03-03 RX ADMIN — AMIODARONE HYDROCHLORIDE 200 MG: 200 TABLET ORAL at 21:42

## 2025-03-03 RX ADMIN — METOPROLOL SUCCINATE 25 MG: 25 TABLET, EXTENDED RELEASE ORAL at 08:43

## 2025-03-03 RX ADMIN — APIXABAN 2.5 MG: 2.5 TABLET, FILM COATED ORAL at 21:42

## 2025-03-03 ASSESSMENT — PAIN SCALES - GENERAL
PAINLEVEL_OUTOF10: 0

## 2025-03-03 NOTE — DISCHARGE INSTR - DIET
Good nutrition is important when healing from an illness, injury, or surgery.  Follow any nutrition recommendations given to you during your hospital stay.   If you were given an oral nutrition supplement while in the hospital, continue to take this supplement at home.  You can take it with meals, in-between meals, and/or before bedtime. These supplements can be purchased at most local grocery stores, pharmacies, and chain [employer]-stores.   If you have any questions about your diet or nutrition, call the hospital and ask for the dietitian.   Regular diet, no added salt

## 2025-03-03 NOTE — PROGRESS NOTES
[facility] Cardiology Inpatient Progress Note    Patient is a 79 y.o. female of Fuentes Herrera MD seen in hospital follow up.     Chief complaint: Afib/CHF    HPI: Some SOB. No CP.    Patient Active Problem List   Diagnosis    Exertional dyspnea    Leg edema    DM (diabetes mellitus) (HCC)    HTN (hypertension)    HLD (hyperlipidemia)    Hypothyroidism    Acute respiratory failure with hypoxia (HCC)    Volume overload    JARED (acute kidney injury)    Nonrheumatic aortic valve stenosis    New onset atrial fibrillation (HCC)    Congestive heart failure (HCC)       No Known Allergies    Current Facility-Administered Medications   Medication Dose Route Frequency Provider Last Rate Last Admin    furosemide (LASIX) injection 40 mg  40 mg IntraVENous Daily Dimas Sewell, DO   40 mg at 02/23/25 0755    metoprolol succinate (TOPROL XL) extended release tablet 25 mg  25 mg Oral BID Dimas Sewell, DO   25 mg at 02/23/25 0755    apixaban (ELIQUIS) tablet 5 mg  5 mg Oral BID Dimas Sewell, DO   5 mg at 02/23/25 0755    levothyroxine (SYNTHROID) tablet 150 mcg  150 mcg Oral Daily Diandra Kolb APRN - CNP   150 mcg at 02/23/25 0536    sodium chloride flush 0.9 % injection 5-40 mL  5-40 mL IntraVENous 2 times per day Diandra Kolb APRN - CNP   10 mL at 02/23/25 0755    sodium chloride flush 0.9 % injection 10 mL  10 mL IntraVENous PRN Diandra Kolb APRN - CNP        0.9 % sodium chloride infusion   IntraVENous PRN Diandra Kolb APRN - CNP        ondansetron (ZOFRAN-ODT) disintegrating tablet 4 mg  4 mg Oral Q8H PRN Diandra Kolb APRN - CNP        Or    ondansetron (ZOFRAN) injection 4 mg  4 mg IntraVENous Q6H PRN Diandra Kolb APRN - CNP        magnesium hydroxide (MILK OF MAGNESIA) 400 MG/5ML suspension 30 mL  30 mL Oral Daily PRN Diandra Kolb APRN - CNP        acetaminophen (TYLENOL) tablet 650 mg  650 mg Oral Q6H PRN Diandra Kolb APRN - CNP        Or    acetaminophen (TYLENOL) suppository 
    [facility]   Progress note      Subjective:  Seen and examined at bedside. No acute event overnight. VSS  Medications Prior to Admission:    Medications Prior to Admission: furosemide (LASIX) 20 MG tablet, TAKE (1)ONE TABLET ONCE DAILY  losartan-hydrochlorothiazide (HYZAAR) 100-25 MG per tablet, Take 1 tablet by mouth daily  Cholecalciferol (VITAMIN D3) 5000 units TABS, Take by mouth daily  Potassium Gluconate 595 (99 K) MG TABS, Take by mouth daily  montelukast (SINGULAIR) 10 MG tablet, Take 1 tablet by mouth nightly  Multiple Vitamins-Minerals (VISION VITAMINS PO), Take  by mouth daily.  Omega-3 Fatty Acids (OMEGA 3 PO), Take 1,000 mg by mouth daily.  albuterol (PROAIR HFA) 108 (90 BASE) MCG/ACT inhaler, Inhale 1-2 puffs into the lungs every 6 hours as needed  levothyroxine (SYNTHROID) 150 MCG tablet, Take 1 tablet by mouth Daily  Flaxseed, Linseed, (EQL FLAX SEED OIL) 1000 MG CAPS, Take 1,300 mg by mouth daily  (Patient not taking: Reported on 2/19/2025)    Current Medications    Current Facility-Administered Medications:     metoprolol succinate (TOPROL XL) extended release tablet 25 mg, 25 mg, Oral, Daily, Dwight Avendano MD, 25 mg at 02/22/25 0833    furosemide (LASIX) tablet 20 mg, 20 mg, Oral, BID, Dwight Avendano MD, 20 mg at 02/22/25 0833    levothyroxine (SYNTHROID) tablet 150 mcg, 150 mcg, Oral, Daily, Diandra Kolb APRN - CNP, 150 mcg at 02/22/25 0639    sodium chloride flush 0.9 % injection 5-40 mL, 5-40 mL, IntraVENous, 2 times per day, Diandra Kolb APRN - CNP, 10 mL at 02/22/25 0834    sodium chloride flush 0.9 % injection 10 mL, 10 mL, IntraVENous, PRN, Diandra Kolb APRN - CNP    0.9 % sodium chloride infusion, , IntraVENous, PRN, Diandra Kolb APRN - CNP    ondansetron (ZOFRAN-ODT) disintegrating tablet 4 mg, 4 mg, Oral, Q8H PRN **OR** ondansetron (ZOFRAN) injection 4 mg, 4 mg, IntraVENous, Q6H PRN, Diandra Kolb, SARA - CNP    magnesium hydroxide 
    [facility]   Progress note      Subjective:  Seen and examined at bedside. No acute event overnight. VSS. Step daughter by the room. Denies fever and has no chills    Medications Prior to Admission:    Medications Prior to Admission: furosemide (LASIX) 20 MG tablet, TAKE (1)ONE TABLET ONCE DAILY  losartan-hydrochlorothiazide (HYZAAR) 100-25 MG per tablet, Take 1 tablet by mouth daily  Cholecalciferol (VITAMIN D3) 5000 units TABS, Take by mouth daily  Potassium Gluconate 595 (99 K) MG TABS, Take by mouth daily  montelukast (SINGULAIR) 10 MG tablet, Take 1 tablet by mouth nightly  Multiple Vitamins-Minerals (VISION VITAMINS PO), Take  by mouth daily.  Omega-3 Fatty Acids (OMEGA 3 PO), Take 1,000 mg by mouth daily.  albuterol (PROAIR HFA) 108 (90 BASE) MCG/ACT inhaler, Inhale 1-2 puffs into the lungs every 6 hours as needed  levothyroxine (SYNTHROID) 150 MCG tablet, Take 1 tablet by mouth Daily  Flaxseed, Linseed, (EQL FLAX SEED OIL) 1000 MG CAPS, Take 1,300 mg by mouth daily  (Patient not taking: Reported on 2/19/2025)    Current Medications    Current Facility-Administered Medications:     furosemide (LASIX) injection 40 mg, 40 mg, IntraVENous, Daily, Dimas Sewell DO, 40 mg at 02/23/25 0755    metoprolol succinate (TOPROL XL) extended release tablet 25 mg, 25 mg, Oral, BID, Dimas Sewell DO, 25 mg at 02/23/25 0755    apixaban (ELIQUIS) tablet 5 mg, 5 mg, Oral, BID, Dimas Sewell DO, 5 mg at 02/23/25 0755    levothyroxine (SYNTHROID) tablet 150 mcg, 150 mcg, Oral, Daily, Diandra Kolb APRN - CNP, 150 mcg at 02/23/25 0536    sodium chloride flush 0.9 % injection 5-40 mL, 5-40 mL, IntraVENous, 2 times per day, Diandra Kolb APRN - CNP, 10 mL at 02/23/25 0755    sodium chloride flush 0.9 % injection 10 mL, 10 mL, IntraVENous, PRN, Diandra Kolb APRN - CNP    0.9 % sodium chloride infusion, , IntraVENous, PRN, Diandra Kolb APRN - CNP    ondansetron (ZOFRAN-ODT) disintegrating 
   03/01/25 2232   NIV Type   NIV Started/Stopped On   Equipment Type v60   Mode Bilevel   Mask Type Full face mask   Mask Size Medium   Assessment   Level of Consciousness 0   Comfort Level Good   Using Accessory Muscles No   Mask Compliance Good   Skin Assessment Clean, dry, & intact   Skin Protection for O2 Device Yes   Orientation Middle   Location Nose   Intervention(s) Skin Barrier   Settings/Measurements   PIP Observed 10 cm H20   IPAP 10 cmH20   CPAP/EPAP 5 cmH2O   Vt (Measured) 415 mL   Rate Ordered 12   FiO2  40 %   I Time/ I Time % 0.85 s   Minute Volume (L/min) 8.4 Liters   Mask Leak (lpm) 19 lpm   Patient's Home Machine No   Alarm Settings   Alarms On Y       
   03/02/25 2229   NIV Type   NIV Started/Stopped Off  (pt declined to be placed on bipap at this time.)       
   03/03/25 0033   NIV Type   $NIV $Daily Charge   Mode (S)  Bilevel  (refuses at this time, standby)       
ABG results sent to CONCEPCION Kolb  
Attempted to check pulse ox on patient while ambulating. When standing patient to side of bed, she was very weak and unable to take steps forward. Did not feel safe walking patient at this time,  notified. Will see if therapy can work with patient.   
CONCEPCION Kolb notified of a.m. lab results.   
Consent for CHUNG w/ possible cardioversion signed and in patient's soft chart  
Consult messaged to trisha butt  
Dr. Mckeon notified of BP, hold 1800 dose of lasix per Dr. Mckeon  
Dr. Mckeon notified of patient's blood pressure.  
Nephrology Progress Note  Patient's Name: Aggie Carter  1:17 PM  2/28/2025    Nephrologist: SASHA Mckeon MD    Reason for Consult:  CKD  Requesting Physician: Dr. LUIS Hummel    Chief Complaint:  Fluid overload and Afib    History Obtained From:  patient and past medical records    History of Present Ilness from the 2/17/25 note:    Aggie Carter is a 79 y.o. female with prior history of CKD G3B with a baseline serum cr 1.6mg/dl with an e-GFR=32ml/min presumed sec to microvascular disease from HTN, dyslipidemia. Pt was admitted on 2/19/25 for vol overload and Afib. She had an episode of Afib with RVR and she told ED MD she had a 21# wt gain. She told me her MD in [place] had once told her diuretics caused kidney problems and she cut back on the dose, but then her legs began to swell. She noted she had A[place].    INTERVAL HX:    2/28/25: Pt awake alert and appropriate. She denied CP or SOB    Past Medical History:   Diagnosis Date    Dyspnea on exertion     Family history of heart disease     Hyperlipidemia     Hypertension     Mild aortic stenosis 05/10/2013       Past Surgical History:   Procedure Laterality Date    TUBAL LIGATION         Family History   Problem Relation Age of Onset    Heart Attack Brother         reports that she quit smoking about 60 years ago. Her smoking use included cigarettes. She started smoking about 61 years ago. She has a 0.3 pack-year smoking history. She has never used smokeless tobacco. She reports that she does not drink alcohol.    Allergies:  Patient has no known allergies.    Current Medications:    amiodarone (CORDARONE) tablet 200 mg, BID  furosemide (LASIX) injection 40 mg, TID  apixaban (ELIQUIS) tablet 2.5 mg, BID  miconazole (MICOTIN) 2 % powder, BID  montelukast (SINGULAIR) tablet 10 mg, Nightly  arformoterol tartrate (BROVANA) nebulizer solution 15 mcg, BID RT   And  budesonide (PULMICORT) nebulizer suspension 500 mcg, BID RT  metoprolol succinate (TOPROL XL) extended release 
Nephrology consult placed via perfect serve   
Nutrition Assessment    Type and Reason for Visit:  Initial, LOS    Nutrition Recommendations/Plan:   When medically feasible, recommend resume PO diet and advance as tolerated  Continue inpatient monitoring POC/GOC     Nutrition Assessment:    Pt admit 2/2 acute resp failure w/hypoxia, JARED, CHF, new onset A-fib. Currently NPO d/t NIPPV 2/27. S/p 2/26 CHUNG and cardioversion. Pt had been eating well prior to NPO. When medically feasible to resume PO, recommend Low Na diet. Discharge planning in progress for CICI/SNF. Will continue to monitor diet progression and POC.    Nutrition Related Findings:    A&Ox4, hypokalemia (3.2), BUN/creat 42/1.6, -I/O 10.1L, +3 edema, Wound Type: None       Current Nutrition Intake & Therapies:    Average Meal Intake: 51-75%, % (prior to current NPO 2/27)  Average Supplements Intake: None Ordered  Diet NPO    Anthropometric Measures:  Height: 165.1 cm (5' 5\")  Ideal Body Weight (IBW): 125 lbs (57 kg)    Admission Body Weight: 130.4 kg (287 lb 7.7 oz) (2/24/2025 bedscale)  Current Body Weight: 131.1 kg (289 lb 0.4 oz), 231.2 % IBW. Weight Source: Bed scale (2/25)  Current BMI (kg/m2): 48.1  Usual Body Weight: 117 kg (257 lb 15 oz) (1/10/25 Nephro OV)     % Weight Change (Calculated): 12.1                    BMI Categories: Obese Class 3 (BMI 40.0 or greater)    Estimated Daily Nutrient Needs:  Energy Requirements Based On: Kcal/kg  Weight Used for Energy Requirements: Admission  Energy (kcal/day):  (12-14 guilherme/kg)  Weight Used for Protein Requirements: Ideal  Protein (g/day): 65-75 (1.2-1.4 g/kg as ignacio)  Method Used for Fluid Requirements: Defer to provider      Nutrition Interventions:   Food and/or Nutrient Delivery: Continue NPO (Advance diet as tolerated when medically feasible)  Nutrition Education/Counseling: Education/Counseling initiated (CHF diet info left by an RD 2/20)  Coordination of Nutrition Care: Continue to monitor while inpatient       Goals:  Goals: Initiate 
O2 sat at rest with 2 L n/c 93%. Amb from br with assist of OT o2 sat with 2 L 87%. Recheck pulse ox at rest with 2 L o2 90%  after 2-3 min rest period.  
Occupational Therapy  OT BEDSIDE TREATMENT NOTE      Date:2025  Patient Name: Aggie Carter  MRN: [id number]  : 1945  Room: [address] [place] [address]     Evaluating OT: Ann Gómez, OTR/L - [id number]     Referring Provider: Dwight Avendano MD  Specific Provider Orders/Date: \"OT eval and treat\" - 2025     Diagnosis: Shortness of breath [R06.02]  JARED (acute kidney injury) [N17.9]  Acute respiratory failure with hypoxia (HCC) [J96.01]  Swelling of both lower extremities [M79.89]  Hypervolemia, unspecified hypervolemia type [E87.70]  Congestive heart failure, unspecified HF chronicity, unspecified heart failure type (HCC) [I50.9]     Pertinent Medical History: HTN     Precautions: fall risk, bed/chair alarms     Assessment of Current Deficits:    [x] Functional mobility             [x] ADLs          [x] Strength                  [x] Cognition   [x] Functional transfers           [x] IADLs         [x] Safety Awareness   [x] Endurance   [] Fine Motor Coordination    [x] Balance      [] Vision/Perception   [x] Sensation    [] Gross Motor Coordination [] ROM          [] Delirium                  [] Motor Control      OT PLAN OF CARE   OT POC is based on physician orders, patient diagnosis, and results of clinical assessment.  Frequency/Duration 2-5 days/week for 2-4 weeks PRN   Specific OT Treatment Interventions to Include:   * Instruction/training on adapted ADL techniques and AE recommendations to increase functional independence within precautions       * Training on energy conservation strategies, correct breathing pattern and techniques to improve independence/tolerance for self-care routine  * Functional transfer/mobility training/DME recommendations for increased independence, safety, and fall prevention  * Patient/Family education to increase follow through with safety techniques and functional independence  * Recommendation of environmental modifications for increased safety with functional 
Occupational Therapy  Patient treatment attempted this AM.  Patient off unit, will continue OT POC as able and appropriate.    Maddie SANTOYO/JOEL [id number]    
Physical Therapy    Pt NA for Rx this AM, off the floor at a procedure. Will follow on another date/time.  Mitesh Miller PTA [id number]    
Physical Therapy  Facility/Department: [address] INTERNAL MEDICINE 2  Physical Therapy Initial Assessment    Name: Aggie Carter  : 1945  MRN: [id number]  Date of Service: 2025        Patient Diagnosis(es): The primary encounter diagnosis was Congestive heart failure, unspecified HF chronicity, unspecified heart failure type (HCC). Diagnoses of Shortness of breath, JARED (acute kidney injury), Acute respiratory failure with hypoxia (HCC), Swelling of both lower extremities, and Hypervolemia, unspecified hypervolemia type were also pertinent to this visit.  Past Medical History:  has a past medical history of Dyspnea on exertion, Family history of heart disease, Hyperlipidemia, Hypertension, and Mild aortic stenosis.  Past Surgical History:  has a past surgical history that includes Tubal ligation.      Evaluating Therapist: Criselda Gonzalez PT    Room #:  0415/0415-A  Diagnosis:  Shortness of breath [R06.02]  JARED (acute kidney injury) [N17.9]  Acute respiratory failure with hypoxia (HCC) [J96.01]  Swelling of both lower extremities [M79.89]  Hypervolemia, unspecified hypervolemia type [E87.70]  Congestive heart failure, unspecified HF chronicity, unspecified heart failure type (HCC) [I50.9]  PMHx/PSHx:  JARED, CHF  Precautions:  falls, alarm, visual deficits      Social:  Pt lives alone in a 1 floor plan ambulates with ww in home and cane in community. Drives short distances.     Initial Evaluation  Date: 25 Treatment      Short Term/ Long Term   Goals   Was pt agreeable to Eval/treatment? yes     Does pt have pain? None reported     Bed Mobility  Rolling: SBA  Supine to sit: SBA  Sit to supine: NT  Scooting: SBA  independent   Transfers Sit to stand: CGA  Stand to sit: CGA  Stand pivot: CGA  independent   Ambulation    20 feet with s cane with min assist  30 feet with ww CG/SBA  150 feet with ww independent   Stair Negotiation  Ascended and descended  NT      LE strength     3+/5    4/5   balance      
Physical Therapy  Facility/Department: [address] INTERNAL MEDICINE 2  Daily Treatment Note  NAME: Aggie Carter  : 1945  MRN: [id number]    Date of Service: 3/3/2025      Patient Diagnosis(es): The primary encounter diagnosis was Congestive heart failure, unspecified HF chronicity, unspecified heart failure type (HCC). Diagnoses of Shortness of breath, JARED (acute kidney injury), Acute respiratory failure with hypoxia (HCC), Swelling of both lower extremities, Hypervolemia, unspecified hypervolemia type, Paroxysmal atrial fibrillation (HCC), and Leg edema were also pertinent to this visit.        Evaluating Therapist: Criselda Gonzalez PT     Room #:  0415/0415-A  Diagnosis:  Shortness of breath [R06.02]  JARED (acute kidney injury) [N17.9]  Acute respiratory failure with hypoxia (HCC) [J96.01]  Swelling of both lower extremities [M79.89]  Hypervolemia, unspecified hypervolemia type [E87.70]  Congestive heart failure, unspecified HF chronicity, unspecified heart failure type (HCC) [I50.9]  PMHx/PSHx:  JARED, CHF  Precautions:  falls, alarm, visual deficits        Social:  Pt lives alone in a 1 floor plan ambulates with ww in home and cane in community. Drives short distances.       Initial Evaluation  Date: 25 Treatment  3/3/25 Short Term/ Long Term   Goals   Was pt agreeable to Eval/treatment? yes yes     Does pt have pain? None reported No c/o pain     Bed Mobility  Rolling: SBA  Supine to sit: SBA  Sit to supine: NT  Scooting: SBA Rolling: Mod A  Supine to sit; Min assist with use of rail  Sit to supine: mod assist  Scooting: Max assist    independent   Transfers Sit to stand: CGA  Stand to sit: CGA  Stand pivot: CGA Sit<> stand max assist independent   Ambulation    20 feet with s cane with min assist  30 feet with ww CG/SBA  feet with ww independent   Stair Negotiation  Ascended and descended  NT       LE strength     3+/5     4/5   balance      fair       AM-PAC Raw score               17/24 10/24   
Physical Therapy  Facility/Department: [address] INTERNAL MEDICINE 2  Physical Therapy Treatment Note    Name: Aggie Carter  : 1945  MRN: [id number]  Date of Service: 2025        Patient Diagnosis(es): The primary encounter diagnosis was Congestive heart failure, unspecified HF chronicity, unspecified heart failure type (HCC). Diagnoses of Shortness of breath, JARED (acute kidney injury), Acute respiratory failure with hypoxia (HCC), Swelling of both lower extremities, Hypervolemia, unspecified hypervolemia type, and Paroxysmal atrial fibrillation (HCC) were also pertinent to this visit.  Past Medical History:  has a past medical history of Dyspnea on exertion, Family history of heart disease, Hyperlipidemia, Hypertension, and Mild aortic stenosis.  Past Surgical History:  has a past surgical history that includes Tubal ligation.      Evaluating Therapist: Criselda Gonzalez PT    Room #:  0415/0415-A  Diagnosis:  Shortness of breath [R06.02]  JARED (acute kidney injury) [N17.9]  Acute respiratory failure with hypoxia (HCC) [J96.01]  Swelling of both lower extremities [M79.89]  Hypervolemia, unspecified hypervolemia type [E87.70]  Congestive heart failure, unspecified HF chronicity, unspecified heart failure type (HCC) [I50.9]  PMHx/PSHx:  JARED, CHF  Precautions:  falls, alarm, visual deficits      Social:  Pt lives alone in a 1 floor plan ambulates with ww in home and cane in community. Drives short distances.     Initial Evaluation  Date: 25 Treatment  2025    Short Term/ Long Term   Goals   Was pt agreeable to Eval/treatment? yes yes    Does pt have pain? None reported B foot/LE pain to touch    Bed Mobility  Rolling: SBA  Supine to sit: SBA  Sit to supine: NT  Scooting: SBA Rolling: Mod A  Supine to sit; Mod A of 2  Sit to supine: Max A of 2  Scooting: Max A seated to EOB   independent   Transfers Sit to stand: CGA  Stand to sit: CGA  Stand pivot: CGA Sit to stand: Attempted. See comments 
[facility] to transport to [facility] today at  2100  
Renal Dose Adjustment Policy (Generic)     This patient is on medication that requires renal, weight, and/or indication dose adjustment.      Date Body Weight IBW  Adjusted BW SCr  CrCl Dialysis status   2/26/2025 131.1 kg (289 lb 0.4 oz) Ideal body weight: 57 kg (125 lb 10.6 oz)  Adjusted ideal body weight: 86.6 kg (191 lb 0.1 oz) Serum creatinine: 1.5 mg/dL (H) 02/25/25 0803  Estimated creatinine clearance: 40 mL/min (A) N/a       Pharmacy has dose-adjusted the following medication(s):    Date Previous Order Adjusted Order   2/26/2025 Apixaban 5 mg twice daily Apixaban 2.5 mg twice daily       These changes were made per protocol according to the [facility]   Automatic Renal Dose Adjustment Policy.     *Please note this dose may need readjusted if patient's condition changes.    Please contact pharmacy with any questions regarding these changes.    sweta albarran RPH  2/26/2025  6:41 AM    
SPO2 dropped to 80% on room air at rest.    SPO2 increased to 95% on 2 liters nasal cannula recovery.   
Spiritual Health History and Assessment/Progress Note  [facility]     Encounter, Rituals, Rites and Sacraments,  ,  ,      Name: Aggie Carter MRN: [id number]    Age: 79 y.o.     Sex: female   Language: English   Mandaeism: Jehovah's witness   Acute respiratory failure with hypoxia (HCC)     Date: 2/21/2025                           Spiritual Assessment began in [address] INTERNAL MEDICINE 2        Referral/Consult From: Rounding   Encounter Overview/Reason:  Encounter, Rituals, Rites and Sacraments  Service Provided For: Patient    Arabella, Belief, Meaning:   Patient is connected with a arabella tradition or spiritual practice  Family/Friends No family/friends present      Importance and Influence:  Patient has no beliefs influential to healthcare decision-making identified during this visit  Family/Friends No family/friends present    Community:  Patient feels well-supported. Support system includes: Children  Family/Friends No family/friends present    Assessment and Plan of Care:     Patient Interventions include: Affirmed coping skills/support systems and Provided sacramental/Latter day ritual  Family/Friends Interventions include: No family/friends present    Patient Plan of Care: Spiritual Care available upon further referral  Family/Friends Plan of Care: Spiritual Care available upon further referral    Electronically signed by Chaplain Indigo on 2/21/2025 at 3:55 PM   
42*   BUN 53* 59* 59*   CREATININE 1.5* 1.6* 1.5*   CALCIUM 9.6 9.7 9.6       Assessment:    Principal Problem:    Acute respiratory failure with hypoxia (HCC)  Active Problems:    HTN (hypertension)    Hypothyroidism    Volume overload    JARED (acute kidney injury)    Nonrheumatic aortic valve stenosis    New onset atrial fibrillation (HCC)    Congestive heart failure (HCC)  Resolved Problems:    * No resolved hospital problems. *      Plan:    Acute respiratory insuff with hypoxia - secondary to pulm edema from volume overload.  -improving.  Was on 4L, weaned to 2L.  SOB improving.  -wean O2 as tolerated. Likely need RT to assess for home O2 need before discharge.     2. Volume overload - symptoms of volume overload, BNP 4000.  -multiple factorial  -no hx of CHF but not been taking diuretics for 1 month. 11 kg weight gain with edema and likely pulm edema.  -echo done - EF 60%. Moderate LVH.   -possible new atrial fib could have caused it or volume overload caused the A fib.  -aortic stenosis may have also exacerbated especially with a fib.     -improving.  1L neg volume status.     -daily weight.  -continue 0.5 mg IV bumex BID, equal to 20 mg IV lasix.  -monitor I/O.  Monitor renal status and lytes closely on it.     3. JARED - likely from volume overload and decreased effective stroke volume.  Creat 1.8 (baseline 1).  Improved to 1.7 with diuretics.  Potassium 5.2 but asymptomatic.    Continue to monitor labs on diuretics.  May require oral potassium binders if potassium fails to improve.  Continue to monitor closely on telemetry.     4. New onset A fib  -possibly caused to volume overload or cause of volume overload.  Potassium 4.6. normal mag.  TSH 4.8 on synthroid.  Troponin 34, but trending down to 29. Likely from cardiac strain from volume overload or rapid a fib.     -still in a fib but rate .   Continue home synthroid.  -on lovenox 30 BID which should work for anticoag with renal function.  May need to 
JARED (acute kidney injury)    Nonrheumatic aortic valve stenosis    New onset atrial fibrillation (HCC)    Congestive heart failure (HCC)  Resolved Problems:    * No resolved hospital problems. *      Plan:  PLAN:     Acute respiratory insuff with hypoxia - secondary to pulm edema from volume overload.  -improving.  Was on 4L, weaned to 2L.  SOB improving.  -wean O2 as tolerated. Likely need RT to assess for home O2 need before discharge.     2. Volume overload - symptoms of volume overload, BNP 4000.  -multiple factorial  -no hx of CHF but not been taking diuretics for 1 month. 11 kg weight gain with edema and likely pulm edema.  -echo done - EF 60%. Moderate LVH.   -possible new atrial fib could have caused it or volume overload caused the A fib.  -aortic stenosis may have also exacerbated especially with a fib.     -improving.  1L neg volume status.     -daily weight.  -continue 0.5 mg IV bumex BID, equal to 20 mg IV lasix.  -monitor I/O.  Monitor renal status and lytes closely on it.     3. JARED - likely from volume overload and decreased effective stroke volume.  Creat 1.8 (baseline 1).  Improved to 1.7 with diuretics.  Potassium 5.2 but asymptomatic.    Continue to monitor labs on diuretics.  May require oral potassium binders if potassium fails to improve.  Continue to monitor closely on telemetry.     4. New onset A fib  -possibly caused to volume overload or cause of volume overload.  Potassium 4.6. normal mag.  TSH 4.8 on synthroid.  Troponin 34, but trending down to 29. Likely from cardiac strain from volume overload or rapid a fib.     -still in a fib but rate .   Continue home synthroid.  -on lovenox 30 BID which should work for anticoag with renal function.  May need to adjust.  Likely require oral anticoag but too soon at this time.  CHAD2 vasc score 2.     -may consider addition of BB or dilt.  -may consider cardiology consult.     5. HTN - BP stable.  On IV bumex. Hold lasix and losartan-HCTZ. 
Nerves: No cranial nerve deficit.      Sensory: No sensory deficit.      Motor: No weakness.   Psychiatric:         Mood and Affect: Mood normal.         Thought Content: Thought content normal.         Judgment: Judgment normal.           Recent Labs     02/19/25  1451 02/20/25  0622   WBC 8.1 6.6   HGB 15.3 14.0   HCT 52.6* 49.1*    146       Recent Labs     02/19/25  1451 02/20/25  0622    142   K 4.6 5.2*   CL 96* 100   CO2 34* 32*   BUN 55* 50*   CREATININE 1.8* 1.7*   CALCIUM 9.2 9.0       Assessment:    Principal Problem:    Acute respiratory failure with hypoxia (HCC)  Active Problems:    HTN (hypertension)    Hypothyroidism    Volume overload    JARED (acute kidney injury)    Nonrheumatic aortic valve stenosis    Atrial fibrillation, new onset (HCC)  Resolved Problems:    * No resolved hospital problems. *      Plan:    Acute respiratory insuff with hypoxia - secondary to pulm edema from volume overload.  -improving.  Was on 4L, weaned to 2L.  SOB improving.  -wean O2 as tolerated. Likely need RT to assess for home O2 need before discharge.     2. Volume overload - symptoms of volume overload, BNP 4000.  -multiple factorial  -no hx of CHF but not been taking diuretics for 1 month. 11 kg weight gain with edema and likely pulm edema.  -echo done - EF 60%. Moderate LVH.   -possible new atrial fib could have caused it or volume overload caused the A fib.  -aortic stenosis may have also exacerbated especially with a fib.     -improving.  1L neg volume status.     -daily weight.  -continue 0.5 mg IV bumex BID, equal to 20 mg IV lasix.  -monitor I/O.  Monitor renal status and lytes closely on it.     3. JARED - likely from volume overload and decreased effective stroke volume.  Creat 1.8 (baseline 1).  Improved to 1.7 with diuretics.  Potassium 5.2 but asymptomatic.    Continue to monitor labs on diuretics.  May require oral potassium binders if potassium fails to improve.  Continue to monitor closely on 
patient problem list and past encounter diagnosis.     Assessment:    Principal Problem:    Acute respiratory failure with hypoxia (HCC)  Active Problems:    HTN (hypertension)    Hypothyroidism    Volume overload    JARED (acute kidney injury)    Nonrheumatic aortic valve stenosis    New onset atrial fibrillation (HCC)    Congestive heart failure (HCC)  Resolved Problems:    * No resolved hospital problems. *      Plan:  PLAN:     Acute respiratory insuff with hypoxia - secondary to pulm edema from volume overload.  -improving.  Was on 4L, weaned to 2L.  SOB improving.  -wean O2 as tolerated. Likely need RT to assess for home O2 need before discharge.     2. Volume overload - symptoms of volume overload, BNP 4000.  -multiple factorial  -no hx of CHF but not been taking diuretics for 1 month. 11 kg weight gain with edema and likely pulm edema.  -echo done - EF 60%. Moderate LVH.   -possible new atrial fib could have caused it or volume overload caused the A fib.  -aortic stenosis may have also exacerbated especially with a fib.     -improving.  1L neg volume status.     -daily weight.  -continue 0.5 mg IV bumex BID, equal to 20 mg IV lasix.  -monitor I/O.  Monitor renal status and lytes closely on it.     3. JARED - likely from volume overload and decreased effective stroke volume.  Creat 1.8 (baseline 1).  Improved to 1.7 with diuretics.  Potassium 5.2 but asymptomatic.    Continue to monitor labs on diuretics.  May require oral potassium binders if potassium fails to improve.  Continue to monitor closely on telemetry.     4. New onset A fib  -possibly caused to volume overload or cause of volume overload.  Potassium 4.6. normal mag.  TSH 4.8 on synthroid.  Troponin 34, but trending down to 29. Likely from cardiac strain from volume overload or rapid a fib.     -still in a fib but rate .   Continue home synthroid.  -on lovenox 30 BID which should work for anticoag with renal function.  May need to adjust.  Likely 
  04/14/19 127.9 kg (282 lb)     Appearance: Awake, alert, no acute respiratory distress  Skin: Intact, no rash  Head: Normocephalic, atraumatic  Eyes: EOMI, no conjunctival erythema  ENMT: No pharyngeal erythema, MMM, no rhinorrhea  Neck: Supple, JVP at 12, no carotid bruits  Lungs: Clear to auscultation bilaterally. No wheezes, rales, or rhonchi.  Cardiac: PMI nondisplaced, Regular rhythm with a normal rate, S1 & S2 normal, no murmurs  Abdomen: Soft, nontender, +bowel sounds  Extremities: Moves all extremities x 4, no lower extremity edema  Neurologic: No focal motor deficits apparent, normal mood and affect  Peripheral Pulses: Intact posterior tibial pulses bilaterally    Intake/Output:    Intake/Output Summary (Last 24 hours) at 2/28/2025 0850  Last data filed at 2/27/2025 2005  Gross per 24 hour   Intake 180 ml   Output 1150 ml   Net -970 ml     No intake/output data recorded.    Laboratory Tests:  Recent Labs     02/27/25  0544 02/28/25  0450    143   K 3.2* 3.0*   CL 93* 90*   CO2 42* 46*   BUN 42* 51*   CREATININE 1.6* 1.7*   GLUCOSE 105* 116*   CALCIUM 9.6 9.8     Lab Results   Component Value Date/Time    MG 2.6 02/20/2025 06:22 AM     Recent Labs     02/27/25  0544 02/28/25  0450   ALKPHOS 64 65   ALT 11 9   AST 9 10   BILITOT 0.7 0.7     Recent Labs     02/27/25  0544 02/28/25  0450   WBC 9.0 10.5   RBC 4.71 4.77   HGB 13.0 13.0   HCT 45.6 44.9   MCV 96.8 94.1   MCH 27.6 27.3   MCHC 28.5* 29.0*   RDW 16.3* 16.2*   PLT  --  107*   MPV 11.8 12.5*     No results found for: \"CKTOTAL\", \"CKMB\", \"CKMBINDEX\", \"TROPONINI\"  Lab Results   Component Value Date    INR 1.0 04/14/2019    PROTIME 11.6 04/14/2019     Lab Results   Component Value Date    TSH 4.82 (H) 02/20/2025     No results found for: \"LABA1C\"  No results found for: \"EAG\"  Lab Results   Component Value Date    CHOL 151 02/20/2025     Lab Results   Component Value Date    TRIG 88 02/20/2025     Lab Results   Component Value Date    HDL 45 
BP stable.  On IV bumex. Hold lasix and losartan-HCTZ. Monitor closely.  May add norvasc, BB, or dilt if needed.       2/22/2025  VSS.   Onset A-fib. CHADVASC score of 5.  Still in A.fib. Will require anticoagulation. On rate control with Toprol 25 mg. Will consult cardiology.  Chronic diastolic heart failure.  Echo 2/20/2025.  EF 60%. On Lasix 20 mg twice daily.  Monitor renal function and replete electrolytes.   Monitor volume status. Cardiology consult as above.  Acute respiratory failure with hypoxia.  Likely secondary to above.  Wean as able.  Keep O2 sat above 92%.  History of hypertension.  Currently holding losartan hydrochlorothiazide due to soft BP  Monitor  Acute renal failure. Presented with creatinine of 1.8.  Improving to 1.4. Baseline of 0.9 (4/14/2019). Avoid nephrotoxic agent.  Continue to monitor.  History of hypothyroidism. on synthroid at home and tolerating well. restart and continue to monitor. recheck thyroid function outpatient      2/23/2025  VSS.   Onset A-fib. CHADVASC score of 5. . On rate control with Toprol 25 mg. Cardiology started her on eliquis.   Chronic diastolic heart failure.  Echo 2/20/2025.  EF 60%. Monitor renal function and replete electrolytes.   Monitor volume status. Cardiology consult as above.   Acute respiratory failure with hypoxia.  Likely secondary to above.  Wean as able.  Keep O2 sat above 92%.  History of hypertension.  Currently holding losartan hydrochlorothiazide due to soft BP and JARED   Acute renal failure. Presented with creatinine of 1.8.  Improving to 1.4. Baseline of 0.9 (4/14/2019). Avoid nephrotoxic agent.  Hold ACE-I Continue to monitor.  History of hypothyroidism. on synthroid at home and tolerating well. restart and continue to monitor. recheck thyroid function outpatient    2/24/2025  VSS.   Onset A-fib. CHADVASC score of 5. . On rate control with Toprol 25 mg. Cardiology started her on eliquis.   Chronic diastolic heart failure.  Echo 2/20/2025.  EF 
    Comments:  Pt laying in the bed.  Motivated to get OOB to a chair.  Frustrated.  States she was walking at home.  Assisted to the EOB.  Pt completed ADL and AROM while seated.  Wheezing/SOB with exertion.  O2 94% and above during this session.  Attempts to stand however pt unable to clear buttocks.  Nursing assisting and pt able to stand with assist x2 however unable to lift LE's to advance for pivot to the chair.  Unable to side step to the HOB.  She was assisted back to the bed.  Ilsa hygiene completed due to bowel incontinence.  Linens changed.  Facilitation of movement to assist with rolling side to side.  Pt's bed placed in a chair position.  Bed alarm activated.     Education/treatment:  ADL retraining with facilitation of movement to increase self care skills. Therapeutic activity to address balance and endurance for ADL and transfers.  Pt education of walker safety, transfer safety, daily orientation.       Pt has made  progress towards set goals.       Time In: 845  Time Out: 925     Min Units   Therapeutic Ex 96261     Therapeutic Activities 59856 20 1   ADL/Self Care 53383 20 2   Orthotic Management 50282     Neuro Re-Ed 90100     Non-Billable Time     TOTAL TIMED TREATMENT 40 3         Nato REYES/L 28332    
Fair use of UEs demonstrated.   AROM noted Patient will demonstrate 4+/5 distal B UE strength in order to maximize independence with ADLs/IADLs and functional transfers.     Comments:  patient cleared with nursing and agreeable to session. Much prompting required today with limited carry over. Increased assist with all functional activity. Nursing notified. Patient returned to bed with call light in reach    Education/treatment: ADL and functional transfer/activity performed to increase safety and independence during self care tasks. Education provided on safety awareness, adl reeducation, functional transfer training    Pt has made a decline in progress towards set goals.     Time In: 9:37  Time Out: 9:56     Min Units   Therapeutic Ex 51771     Therapeutic Activities 62444 19 1   ADL/Self Care 18227     Orthotic Management 31007     Neuro Re-Ed 44399     Non-Billable Time     TOTAL TIMED TREATMENT 19 1       Maddie SANTOYO/JOEL [id number]    
closely on it.     3. JARED - likely from volume overload and decreased effective stroke volume.  Creat 1.8 (baseline 1).  Improved to 1.7 with diuretics.  Potassium 5.2 but asymptomatic.    Continue to monitor labs on diuretics.  May require oral potassium binders if potassium fails to improve.  Continue to monitor closely on telemetry.     4. New onset A fib  -possibly caused to volume overload or cause of volume overload.  Potassium 4.6. normal mag.  TSH 4.8 on synthroid.  Troponin 34, but trending down to 29. Likely from cardiac strain from volume overload or rapid a fib.     -still in a fib but rate .   Continue home synthroid.  -on lovenox 30 BID which should work for anticoag with renal function.  May need to adjust.  Likely require oral anticoag but too soon at this time.  CHAD2 vasc score 2.     -may consider addition of BB or dilt.  -may consider cardiology consult.     5. HTN - BP stable.  On IV bumex. Hold lasix and losartan-HCTZ. Monitor closely.  May add norvasc, BB, or dilt if needed.       2/22/2025  VSS.   Onset A-fib. CHADVASC score of 5.  Still in A.fib. Will require anticoagulation. On rate control with Toprol 25 mg. Will consult cardiology.  Chronic diastolic heart failure.  Echo 2/20/2025.  EF 60%. On Lasix 20 mg twice daily.  Monitor renal function and replete electrolytes.   Monitor volume status. Cardiology consult as above.  Acute respiratory failure with hypoxia.  Likely secondary to above.  Wean as able.  Keep O2 sat above 92%.  History of hypertension.  Currently holding losartan hydrochlorothiazide due to soft BP  Monitor  Acute renal failure. Presented with creatinine of 1.8.  Improving to 1.4. Baseline of 0.9 (4/14/2019). Avoid nephrotoxic agent.  Continue to monitor.  History of hypothyroidism. on synthroid at home and tolerating well. restart and continue to monitor. recheck thyroid function outpatient      2/23/2025  VSS.   Onset A-fib. CHADVASC score of 5. . On rate control 
diastolic heart failure.  Echo 2/20/2025.  EF 60%. Monitor renal function and replete electrolytes.   Monitor volume status. Cardiology consult as above.   Acute respiratory failure with hypoxia.  Likely secondary to above.  Wean as able.  Keep O2 sat above 92%. Currently n 3 L patient does have asthma and on Symbicort at home.  Reordered Symbicort.  History of hypertension.  Currently holding losartan hydrochlorothiazide due to soft BP and AJRED   Acute renal failure. Presented with creatinine of 1.8.  Improving to 1.4. Baseline of 0.9 (4/14/2019). Avoid nephrotoxic agent.  Hold ACE-I Continue to monitor.  History of hypothyroidism. on synthroid at home and tolerating well. restart and continue to monitor. recheck thyroid function outpatient  Obesity.  BMI of 47.  Monitor.  Provide obesity education at discharge.    2/25/25  -IV Lasix 40 mg increased to twice daily per cardiology  -IV Diamox 500mg x 1 and Zaroxolyn 5 mg p.o. x 1 per cardiology  -Currently net -7.6L  -Weaning down on supplemental O2 currently on 3 L nasal cannula satting 93%  -Spoke with cardiology who plans for possible cardioversion tomorrow  -Continue to monitor kidney function and electrolytes with IV diuresis  -Await final plan from cardiology     Code Status: Full  Consultants: Cardiology  DVT Prophylaxis : Eliquis   PT/OT:  Discharge planning/Disposition: Home versus CHUNG       SARA Hogue - CNP  12:08 PM  2/25/2025   
m/s. AV Velocity Ratio is 0.30. LVOT:AV VTI Index is 0.32. LVOT diameter is 2.2 cm. AV area by continuity VTI is 1.2 cm2. AV area by peak velocity is 1.2 cm2.    Tricuspid Valve: Mild regurgitation. The estimated RVSP is 49 mmHg.    Contrast used: Lumason.    ----------------------------------------------------------------------------------------------------------------------------------------------------------------  IMPRESSION:  New atrial fibrillation with RVR  Acute decompensated heart failure with preserved EF  Valvular heart disease, mild to moderate pulmonary hypertension  Morbid obesity  Elevated troponin  Acute on chronic renal failure  Hypertension  Hyperlipidemia  Hypothyroidism    RECOMMENDATIONS:    5 L negative.  Remains hypervolemic.  Increase Lasix to 40 IV twice daily.  Continue monitoring daily weights, renal function urine output  On Eliquis for anticoagulation  On Toprol-XL for rate control  Will proceed with CHUNG guided cardioversion should atrial fibrillation persist on Wednesday.  Mild aortic stenosis not contributing to current presentation  At least mild pulmonary hypertension which is multifactorial with group 2/3 etiologies  Needs outpatient sleep study.  Needs to be considered for GLP-1 agonist therapy on an outpatient basis.  Dietary and lifestyle modification counseling provided.    Greater than 50 minutes was spent counseling the patient, reviewing the rationale for the above recommendations and reviewing the patient's current medication list, problem list and results of all previously ordered testing.      Simone Chan MD, [facility]    NOTE: This report was transcribed using voice recognition software. Every effort was made to ensure accuracy; however, inadvertent computerized transcription errors may be present.    
02/20/2025 06:22 AM     TSH:    Lab Results   Component Value Date/Time    TSH 4.82 02/20/2025 06:22 AM     VITAMIN B12: No components found for: \"B12\"  FOLATE:  No results found for: \"FOLATE\"  Iron Saturation:  No components found for: \"PERCENTFE\"  FERRITIN:    Lab Results   Component Value Date/Time    FERRITIN 43 02/20/2025 06:22 AM     LIPASE:  No results found for: \"LIPASE\"  Fibrinogen Level:  No components found for: \"FIB\"  24 Hour Urine for Protein:  No components found for: \"RAWUPRO\", \"UHRS3\", \"YHKT63FT\", \"UTV3\"  24 Hour Urine for Creatinine Clearance:  No components found for: \"CREAT4\", \"UHRS10\", \"UTV10\"     Imaging:  XR CHEST PORTABLE [[id number]] Collected: 02/27/25 1057     Order Status: Completed Updated: 02/27/25 1107    Narrative:      EXAMINATION:  ONE XRAY VIEW OF THE CHEST    2/27/2025 10:22 am    COMPARISON:  February 19, 2025    HISTORY:  ORDERING SYSTEM PROVIDED HISTORY: hypoxia  TECHNOLOGIST PROVIDED HISTORY:  Reason for exam:->hypoxia    FINDINGS:  Patient leaning to the left.  Interval placement of m left meta port  overlying left chest wall with catheter tip in right atrium.  Cardiopericardial silhouette enlarged.  Low lung volumes.  Lungs clear.  No  pneumothorax.    Impression:      Interval insertion of meta port type catheter.  Cardiomegaly.    CTA PULMONARY W CONTRAST [[id number]] Collected: 02/19/25 2253    Order Status: Completed Updated: 02/19/25 2300    Narrative:      EXAMINATION:  CTA OF THE CHEST 2/19/2025 8:51 pm    TECHNIQUE:  CTA of the chest was performed after the administration of intravenous  contrast.  Multiplanar reformatted images are provided for review.  MIP  images are provided for review. Automated exposure control, iterative  reconstruction, and/or weight based adjustment of the mA/kV was utilized to  reduce the radiation dose to as low as reasonably achievable.    COMPARISON:  None.    HISTORY:  ORDERING SYSTEM PROVIDED HISTORY: fluid overload symptoms, 
Re-Ed 28410     Non-Billable Time N/A ---     Evaluation time includes thorough review of current medical information, gathering information on past medical history/social history and prior level of function, completion of standardized testing/informal observation of tasks, assessment of data, and education on plan of care and goals.    Ann Gómez, OTR/L  License Number: [id number]      
failure  Hypertension  Hyperlipidemia  Hypothyroidism    RECOMMENDATIONS:    9 L negative.  Remains hypervolemic.  Continue 40 IV twice daily.  Continue monitoring daily weights, renal function urine output.  Got metolazone and Diamox yesterday.  Repeat a dose today.  Wean oxygen as able  On Eliquis for anticoagulation  On Toprol-XL for rate control  Successful CHUNG guided cardioversion today.  Bicuspid aortic valve with mild stenosis.  Mild to moderate RV dysfunction.  Mild aortic stenosis not contributing to current presentation  At least mild pulmonary hypertension which is multifactorial with group 2/3 etiologies  Needs outpatient sleep study.  Needs to be considered for GLP-1 agonist therapy on an outpatient basis.  Dietary and lifestyle modification counseling provided.  Discussed a referral to lymphedema clinic.  Not agreeable at this time.  IV diuresis for another 24 hours.    Greater than 50 minutes was spent counseling the patient, reviewing the rationale for the above recommendations and reviewing the patient's current medication list, problem list and results of all previously ordered testing.      Simone Chan MD, [facility]    NOTE: This report was transcribed using voice recognition software. Every effort was made to ensure accuracy; however, inadvertent computerized transcription errors may be present.    
management, with known CKD stage 3/4, appreciated with ongoing diuretic therapy and future management, patient with prior diagnosis of contraction alkalosis  -Will require outpatient sleep study once discharged  -Check uric acid level  -Await therapy evals tomorrow as they attempted today and was unsuccessful    2/28/2025  No acute events or issues overnight  Resting comfortably, appears somewhat dyspneic but tells me she is not short of breath  She remains on nasal cannula of 3-1/2 to 4 L today  Decline in PT score noted from 17 on 2/ 21 to 8/24 on 2/27  Possible discharge over weekend if arrangements made to subacute rehab  ABG from this morning shows a pCO2 of 89.4-continue noninvasive ventilation  Appreciate consultants input    3/1/25  -Improved kidney function today 53/1.5  -CO2 on chemistry 44  -Remains on IV Lasix 40 mg twice daily however evening dose held yesterday due to BP  -RRT yesterday evening for hypotension per orders of cardiology  -ABGs with a pCO2 of 92.2 and HCO3 49.8  -Discussed BiPAP use with patient however reluctant to wear it  -Decrease in AM-PAC scores, need for CICI on DC  -Patient accepted to [facility] for rehab once medically stable for discharge    Code Status: Full  Consultants: Cardiology    DVT Prophylaxis : Eliquis   PT/OT:  Discharge planning/Disposition: To skilled nursing facility when medically ready       SARA Hogue - CNP  10:24 AM  3/1/2025   
patient, reviewing the rationale for the above recommendations and reviewing the patient's current medication list, problem list and results of all previously ordered testing.      Simone Chan MD, [facility]    NOTE: This report was transcribed using voice recognition software. Every effort was made to ensure accuracy; however, inadvertent computerized transcription errors may be present.    
valve with mild aortic stenosis  Elevated troponin  Acute on chronic renal failure  Hypertension  Hyperlipidemia  Hypothyroidism    RECOMMENDATIONS:    10 L negative.  Remains hypervolemic.  Continue 40 IV twice daily.  Continue monitoring daily weights, renal function urine output.  Has received doses of metolazone and Diamox  Wean oxygen as able  On Eliquis for anticoagulation  On Toprol-XL for rate control  Recurrent atrial fibrillation now.  Bicuspid aortic valve with mild stenosis.  Mild to moderate RV dysfunction.  Mild aortic stenosis not contributing to current presentation  At least mild pulmonary hypertension which is multifactorial with group 2/3 etiologies  Needs outpatient sleep study.  Needs to be considered for GLP-1 agonist therapy on an outpatient basis.  Dietary and lifestyle modification counseling provided.  Discussed a referral to lymphedema clinic.  Not agreeable at this time.  Will transition to Demadex 20 mg daily.  Will need congestive heart failure clinic follow-up.  Being placed on BiPAP today for respiratory acidosis  With recurrent atrial fibrillation, will try initiation and loading with amiodarone.  Can be considered for repeat attempt on an outpatient basis.  Unlikely to be a successful rhythm control patient.    Greater than 50 minutes was spent counseling the patient, reviewing the rationale for the above recommendations and reviewing the patient's current medication list, problem list and results of all previously ordered testing.      Simone Chan MD, [facility] Cardiology    NOTE: This report was transcribed using voice recognition software. Every effort was made to ensure accuracy; however, inadvertent computerized transcription errors may be present.    
need RT to assess for home O2.  Patient prefer not to use bipap tonight. Will do trial off it.     Code Status: Full  Consultants: Cardiology     DVT Prophylaxis : Eliquis   PT/OT:  Discharge planning/Disposition: To skilled nursing facility when medically ready     Dwight Avendano MD  2:39 PM  3/2/2025   
goal <130/80(ACC/AHA Target)-Hypotension improved  PLAN:  Follow BP    Thank you  for allowing us to participate in care of Aggie Raul Mckeon MD  1:28 PM  3/2/2025  
the Cl(-) salt of the K+ ,so as not to exacerbate the alkalosis  Follow K+ and Mg++    4-Chronic Resp Acidosis with a compensatory Metabolic Alkalosis   PLAN:  Follow HCO3 and the ABG's    5- HTN with CKD I-IV  BP goal <130/80(ACC/AHA Target)-Hypotension improved  PLAN:  Follow BP    Thank you  for allowing us to participate in care of Aggie Kerrhillary Mckeon MD  12:01 PM  3/1/2025

## 2025-03-03 NOTE — PLAN OF CARE
Problem: Chronic Conditions and Co-morbidities  Goal: Patient's chronic conditions and co-morbidity symptoms are monitored and maintained or improved  2/25/2025 1036 by Jennie Santos RN  Outcome: Progressing     Problem: Chronic Conditions and Co-morbidities  Goal: Patient's chronic conditions and co-morbidity symptoms are monitored and maintained or improved  2/25/2025 2205 by Agustin Botello RN  Outcome: Progressing  2/25/2025 1036 by Jennie Santos RN  Outcome: Progressing     Problem: Discharge Planning  Goal: Discharge to home or other facility with appropriate resources  2/25/2025 2205 by Agustin Botello RN  Outcome: Progressing  2/25/2025 1036 by Jennie Santos RN  Outcome: Progressing     Problem: Safety - Adult  Goal: Free from fall injury  2/25/2025 2205 by Agustin Botello RN  Outcome: Progressing  2/25/2025 1036 by Jennie Santos RN  Outcome: Progressing     Problem: ABCDS Injury Assessment  Goal: Absence of physical injury  2/25/2025 2205 by Agustin Botello RN  Outcome: Progressing  2/25/2025 1036 by Jennie Santos RN  Outcome: Progressing     Problem: Respiratory - Adult  Goal: Achieves optimal ventilation and oxygenation  2/25/2025 2205 by Agustin Botello RN  Outcome: Progressing  2/25/2025 1036 by Jennie Santos RN  Outcome: Progressing     Problem: Pain  Goal: Verbalizes/displays adequate comfort level or baseline comfort level  Outcome: Progressing     
  Problem: Chronic Conditions and Co-morbidities  Goal: Patient's chronic conditions and co-morbidity symptoms are monitored and maintained or improved  2/25/2025 1036 by Jennie Santos RN  Outcome: Progressing  2/25/2025 0304 by Tone Prieto RN  Outcome: Progressing     Problem: Discharge Planning  Goal: Discharge to home or other facility with appropriate resources  2/25/2025 1036 by Jennie Santos RN  Outcome: Progressing  2/25/2025 0304 by Tone Prieto RN  Outcome: Progressing     Problem: Safety - Adult  Goal: Free from fall injury  2/25/2025 1036 by Jennie Santos RN  Outcome: Progressing  2/25/2025 0304 by Tone Prieto RN  Outcome: Progressing     Problem: ABCDS Injury Assessment  Goal: Absence of physical injury  2/25/2025 1036 by Jennie Santos RN  Outcome: Progressing  2/25/2025 0304 by Tone Prieto RN  Outcome: Progressing     Problem: Respiratory - Adult  Goal: Achieves optimal ventilation and oxygenation  Outcome: Progressing     
  Problem: Chronic Conditions and Co-morbidities  Goal: Patient's chronic conditions and co-morbidity symptoms are monitored and maintained or improved  2/27/2025 0925 by Kristine Cervantes RN  Outcome: Progressing     Problem: Discharge Planning  Goal: Discharge to home or other facility with appropriate resources  2/27/2025 0925 by Kristine Cervantes RN  Outcome: Progressing     Problem: Safety - Adult  Goal: Free from fall injury  2/27/2025 2051 by Netta Haney RN  Outcome: Progressing  2/27/2025 0925 by Kristine Cervantes RN  Outcome: Progressing     Problem: ABCDS Injury Assessment  Goal: Absence of physical injury  2/27/2025 0925 by Kristine Cervantes RN  Outcome: Progressing     Problem: Pain  Goal: Verbalizes/displays adequate comfort level or baseline comfort level  2/27/2025 2051 by Netta Haney RN  Outcome: Progressing  2/27/2025 0925 by Kristine Cervantes RN  Outcome: Progressing     Problem: Skin/Tissue Integrity  Goal: Skin integrity remains intact  Description: 1.  Monitor for areas of redness and/or skin breakdown  2.  Assess vascular access sites hourly  3.  Every 4-6 hours minimum:  Change oxygen saturation probe site  4.  Every 4-6 hours:  If on nasal continuous positive airway pressure, respiratory therapy assess nares and determine need for appliance change or resting period  2/27/2025 2051 by Netta Haney RN  Outcome: Progressing  2/27/2025 0925 by Kristine Cervantes RN  Outcome: Progressing     Problem: Respiratory - Adult  Goal: Achieves optimal ventilation and oxygenation  2/27/2025 2051 by Netta Haney RN  Outcome: Not Progressing  2/27/2025 0925 by Kristine Cervantes RN  Outcome: Progressing     
  Problem: Chronic Conditions and Co-morbidities  Goal: Patient's chronic conditions and co-morbidity symptoms are monitored and maintained or improved  3/3/2025 1006 by Jennie Santos RN  Outcome: Progressing  3/3/2025 0102 by Debo Schwartz RN  Outcome: Progressing     Problem: Discharge Planning  Goal: Discharge to home or other facility with appropriate resources  3/3/2025 1006 by Jennie Santos RN  Outcome: Progressing  3/3/2025 0102 by Debo Schwartz RN  Outcome: Progressing     Problem: Safety - Adult  Goal: Free from fall injury  3/3/2025 1006 by Jennie Santos RN  Outcome: Progressing  3/3/2025 0102 by Debo Schwartz RN  Outcome: Progressing     Problem: ABCDS Injury Assessment  Goal: Absence of physical injury  3/3/2025 1006 by Jennie Santos RN  Outcome: Progressing  3/3/2025 0102 by Debo Schwartz RN  Outcome: Progressing     Problem: Respiratory - Adult  Goal: Achieves optimal ventilation and oxygenation  3/3/2025 1006 by Jennie Santos RN  Outcome: Progressing  3/3/2025 0102 by Debo Schwartz RN  Outcome: Progressing     Problem: Pain  Goal: Verbalizes/displays adequate comfort level or baseline comfort level  3/3/2025 1006 by Jennie Santos RN  Outcome: Progressing  3/3/2025 0102 by Debo Schwartz RN  Outcome: Progressing     Problem: Skin/Tissue Integrity  Goal: Skin integrity remains intact  Description: 1.  Monitor for areas of redness and/or skin breakdown  2.  Assess vascular access sites hourly  3.  Every 4-6 hours minimum:  Change oxygen saturation probe site  4.  Every 4-6 hours:  If on nasal continuous positive airway pressure, respiratory therapy assess nares and determine need for appliance change or resting period  3/3/2025 1006 by Jennie Santos RN  Outcome: Progressing  3/3/2025 0102 by Debo Schwartz RN  Outcome: Not Progressing     Problem: Metabolic/Fluid and Electrolytes - Adult  Goal: Electrolytes maintained within normal limits  3/3/2025 1006 by Jennie Santos 
  Problem: Chronic Conditions and Co-morbidities  Goal: Patient's chronic conditions and co-morbidity symptoms are monitored and maintained or improved  Outcome: Progressing     Problem: Discharge Planning  Goal: Discharge to home or other facility with appropriate resources  Outcome: Progressing     Problem: Safety - Adult  Goal: Free from fall injury  2/28/2025 1002 by Kristine Cervantes RN  Outcome: Progressing  2/27/2025 2051 by Netta Haney RN  Outcome: Progressing     Problem: Respiratory - Adult  Goal: Achieves optimal ventilation and oxygenation  2/28/2025 1002 by Kristine Cervantes RN  Outcome: Progressing  2/27/2025 2051 by Netta Haney RN  Outcome: Not Progressing     Problem: Metabolic/Fluid and Electrolytes - Adult  Goal: Electrolytes maintained within normal limits  Reactivated     Problem: Respiratory - Adult  Goal: Achieves optimal ventilation and oxygenation  2/28/2025 1002 by Kristine Cervantes RN  Outcome: Progressing  2/27/2025 2051 by Netta Haney RN  Outcome: Not Progressing     
  Problem: Chronic Conditions and Co-morbidities  Goal: Patient's chronic conditions and co-morbidity symptoms are monitored and maintained or improved  Outcome: Progressing     Problem: Discharge Planning  Goal: Discharge to home or other facility with appropriate resources  Outcome: Progressing     Problem: Safety - Adult  Goal: Free from fall injury  Outcome: Progressing     Problem: ABCDS Injury Assessment  Goal: Absence of physical injury  Outcome: Progressing     
  Problem: Chronic Conditions and Co-morbidities  Goal: Patient's chronic conditions and co-morbidity symptoms are monitored and maintained or improved  Outcome: Progressing     Problem: Discharge Planning  Goal: Discharge to home or other facility with appropriate resources  Outcome: Progressing     Problem: Safety - Adult  Goal: Free from fall injury  Outcome: Progressing     Problem: ABCDS Injury Assessment  Goal: Absence of physical injury  Outcome: Progressing     Problem: Respiratory - Adult  Goal: Achieves optimal ventilation and oxygenation  Outcome: Progressing     Problem: Metabolic/Fluid and Electrolytes - Adult  Goal: Electrolytes maintained within normal limits  Outcome: Progressing     Problem: Cardiovascular - Adult  Goal: Maintains optimal cardiac output and hemodynamic stability  Outcome: Progressing     Problem: Pain  Goal: Verbalizes/displays adequate comfort level or baseline comfort level  Outcome: Progressing     Problem: Skin/Tissue Integrity  Goal: Skin integrity remains intact  Description: 1.  Monitor for areas of redness and/or skin breakdown  2.  Assess vascular access sites hourly  3.  Every 4-6 hours minimum:  Change oxygen saturation probe site  4.  Every 4-6 hours:  If on nasal continuous positive airway pressure, respiratory therapy assess nares and determine need for appliance change or resting period  Outcome: Not Progressing     Problem: Skin/Tissue Integrity  Goal: Skin integrity remains intact  Description: 1.  Monitor for areas of redness and/or skin breakdown  2.  Assess vascular access sites hourly  3.  Every 4-6 hours minimum:  Change oxygen saturation probe site  4.  Every 4-6 hours:  If on nasal continuous positive airway pressure, respiratory therapy assess nares and determine need for appliance change or resting period  Outcome: Not Progressing     
  Problem: Chronic Conditions and Co-morbidities  Goal: Patient's chronic conditions and co-morbidity symptoms are monitored and maintained or improved  Outcome: Progressing     Problem: Discharge Planning  Goal: Discharge to home or other facility with appropriate resources  Outcome: Progressing     Problem: Safety - Adult  Goal: Free from fall injury  Outcome: Progressing     Problem: ABCDS Injury Assessment  Goal: Absence of physical injury  Outcome: Progressing     Problem: Respiratory - Adult  Goal: Achieves optimal ventilation and oxygenation  Outcome: Progressing     Problem: Pain  Goal: Verbalizes/displays adequate comfort level or baseline comfort level  Outcome: Progressing     
  Problem: Chronic Conditions and Co-morbidities  Goal: Patient's chronic conditions and co-morbidity symptoms are monitored and maintained or improved  Outcome: Progressing     Problem: Discharge Planning  Goal: Discharge to home or other facility with appropriate resources  Outcome: Progressing     Problem: Safety - Adult  Goal: Free from fall injury  Outcome: Progressing     Problem: ABCDS Injury Assessment  Goal: Absence of physical injury  Outcome: Progressing     Problem: Respiratory - Adult  Goal: Achieves optimal ventilation and oxygenation  Outcome: Progressing     Problem: Pain  Goal: Verbalizes/displays adequate comfort level or baseline comfort level  Outcome: Progressing     Problem: Skin/Tissue Integrity  Goal: Skin integrity remains intact  Description: 1.  Monitor for areas of redness and/or skin breakdown  2.  Assess vascular access sites hourly  3.  Every 4-6 hours minimum:  Change oxygen saturation probe site  4.  Every 4-6 hours:  If on nasal continuous positive airway pressure, respiratory therapy assess nares and determine need for appliance change or resting period  Outcome: Progressing     Problem: Cardiovascular - Adult  Goal: Maintains optimal cardiac output and hemodynamic stability  Outcome: Progressing     Problem: Metabolic/Fluid and Electrolytes - Adult  Goal: Electrolytes maintained within normal limits  Outcome: Not Progressing  Flowsheets (Taken 3/1/2025 8503)  Electrolytes maintained within normal limits:   Monitor labs and assess patient for signs and symptoms of electrolyte imbalances   Administer electrolyte replacement as ordered   Monitor response to electrolyte replacements, including repeat lab results as appropriate     
  Problem: Discharge Planning  Goal: Discharge to home or other facility with appropriate resources  2/27/2025 0925 by Kristine Cervantes RN  Outcome: Progressing  2/27/2025 0118 by Netta Haney RN  Outcome: Progressing     Problem: Safety - Adult  Goal: Free from fall injury  2/27/2025 0925 by Kristine Cervantes RN  Outcome: Progressing  2/27/2025 0118 by Netta Haney RN  Outcome: Progressing     Problem: Respiratory - Adult  Goal: Achieves optimal ventilation and oxygenation  2/27/2025 0925 by Kristine Cervantes RN  Outcome: Progressing  2/27/2025 0118 by Netta Haney RN  Outcome: Progressing     Problem: Pain  Goal: Verbalizes/displays adequate comfort level or baseline comfort level  2/27/2025 0925 by Kristine Cervantes RN  Outcome: Progressing  2/27/2025 0118 by Netta Haney RN  Outcome: Progressing     
  Problem: Safety - Adult  Goal: Free from fall injury  2/20/2025 1308 by Jaron Turner RN  Outcome: Progressing  2/20/2025 0040 by Valdez Barrera RN  Outcome: Progressing     Problem: ABCDS Injury Assessment  Goal: Absence of physical injury  2/20/2025 1308 by Jaron Turner RN  Outcome: Progressing  2/20/2025 0040 by Valdez Barrera RN  Outcome: Progressing     
  Problem: Safety - Adult  Goal: Free from fall injury  2/21/2025 1242 by Jaron Turner RN  Outcome: Progressing  2/21/2025 0525 by Saritha Orozco RN  Outcome: Progressing     Problem: ABCDS Injury Assessment  Goal: Absence of physical injury  2/21/2025 1242 by Jaron Turner RN  Outcome: Progressing  Flowsheets (Taken 2/21/2025 0831)  Absence of Physical Injury: Implement safety measures based on patient assessment  2/21/2025 0525 by Saritha Orozco RN  Outcome: Progressing     
Patient's chart updated to reflect:      .    - HF care plan, HF education points and HF discharge instructions.  -Orders: 2 gram sodium diet, daily weights, I/O.  -PCP or cardiology follow up appointments to be scheduled within 7 days of hospital discharge.  -CHF education session will be provided to the patient prior to hospital discharge.    Sonam Ortega RN   Heart Failure Navigator   
RN  Outcome: Progressing  3/1/2025 1353 by Naz Mckeon RN  Outcome: Progressing     Problem: Respiratory - Adult  Goal: Achieves optimal ventilation and oxygenation  3/2/2025 0308 by Divya Hong RN  Outcome: Not Progressing  Note: Patient's ABG's don't display much improvement. She was agreeable to wearing the bipap tonight.  3/1/2025 1353 by Naz Mckeon RN  Outcome: Progressing

## 2025-03-03 NOTE — DISCHARGE SUMMARY
[facility]  FOLLOWUP VISIT      REASON FOR VISIT:  Breast cancer followup     HPI: The patient is a 72 year old female  who is s/p right partial mastectomy performed June 1, 2022.  Final pathology revealed two separate foci of invasive lobular carcinoma, 1 measuring 13 mm and 1 measuring 31 mm.  The cancer did go down to the chest wall but the dissection was taken to the pectoralis muscle.  She has other close margins but they are all negative.  She was seen by Dr. Bustamante from medical oncology and underwent Oncotype DX testing with a recurrence score of 24.  She did not receive chemotherapy.  She was started on adjuvant endocrine therapy July 2022.  She did not undergo radiation therapy.  She underwent a stereotactic biopsy of an abnormality seen in her left breast with benign findings in May of 2023.  This was found to be concordant.  Current concerns today include:  none    PAST MEDICAL HISTORY:  Past Medical History:   Diagnosis Date    Arthritis     Breast carcinoma  (CMD) 08/01/2002    s/p lumpectomy, R    Depression     in the past after some deaths    Disorder of bone and cartilage, unspecified 07/29/2010    Hyperlipidemia     PONV (postoperative nausea and vomiting)     Spinal headache     Urinary tract infection        PAST SURGICAL HISTORY:  Past Surgical History:   Procedure Laterality Date    Breast biopsy Left     2002    Breast biopsy Right     localization rt    Breast lumpectomy Right 08/2002    Colonoscopy  09/21/2007    Colonoscopy      D and c  10/11/2002    Dexa bone density axial skeleton  07/30/2010    Eye surgery      angi cataracts    Hysterectomy  03/19/2008    LAVH, TOT, BSO    Joint replacement      angi hips    Mammo stereotactic biopsy right Right 05/09/2022    Mammo stereotactic biopsy w michelle left Left 05/2023    Left stereotactic biopsy   benign    Mastectomy partial Right 06/01/2022    Oophorectomy      Tonsillectomy      Total hip  [facility]   Discharge summary   Patient ID:  Aggie Carter  [id number]  79 y.o.  1945    Admit date: 2/19/2025    Discharge date and time: 3/3/2025    Admission Diagnoses:   Patient Active Problem List   Diagnosis    Exertional dyspnea    Leg edema    DM (diabetes mellitus) (HCC)    HTN (hypertension)    HLD (hyperlipidemia)    Hypothyroidism    Nonrheumatic aortic valve stenosis    New onset atrial fibrillation (HCC)    Benign hypertensive kidney disease    Bilateral nonexudative age-related macular degeneration    Body mass index (BMI) 40.0-44.9, adult    Herpes zoster without complication    Ptosis of eyelid    Pure hypercholesterolemia    Seborrheic dermatitis of scalp    Stage 3b chronic kidney disease (HCC)       Discharge Diagnoses: volume overload, acute resp insuff, new onset Atrial fib    Consults: cardiology and nephrology    Procedures: echo.    Hospital Course: The patient is a 79 y.o. female of Fu[place] Herrera MD with significant past medical history of hypothyroidism, HLD, HTN and aortic stensosi who presents with SOB and weight gain.    Presented with concern for CHF exacerbation.  Not been taking diuretic for several weeks, and new diagnosis A fib.  Gained 11 kg over 1 month.  On 4L O2.  No hx home O2.  Started on IV bumex.  Echo normal showing EF 60%, with moderate LVH, and mild aortic stenosis from bicuspid valve. Likely diastolic CHF.  JARED - improved to baseline 1.5 with IV diuresis.  TSH normal. ARB and HCTZ stopped.  Had troponin bump but felt related to cardiac strain from CHF, a fib, and JARED.  Rate controlled with toprol and amiodarone.  Started on Eliquis due to CHAD2 vasc score 5; changed to xarelto due to patient's insurance.  Transitioned to oral lasix BID, did well. Repleted potassium as needed.  Able to wean to 3L O2. Neg 7L total diuresis.  BP stable.    Stable to be discharged to [facility]        Recent Labs     03/01/25  0752 03/02/25  0532   WBC 8.3 6.8  arthroplasty Right 11/19/2009    right    Total hip arthroplasty Left 07/28/2011    left       MEDICATIONS:  Current Outpatient Medications   Medication Sig Dispense Refill    sertraline (ZOLOFT) 25 MG tablet Take 1 tablet by mouth in the morning and 1 tablet in the evening. 180 tablet 1    oxybutynin (DITROPAN-XL) 5 MG 24 hr tablet Take 1 tablet by mouth daily. 90 tablet 1    atorvastatin (LIPITOR) 10 MG tablet TAKE 1 TABLET BY MOUTH AT BEDTIME 90 tablet 0    tamoxifen (NOLVADEX) 20 MG tablet Take 1 tablet by mouth once daily 90 tablet 3    amoxicillin (AMOXIL) 500 MG capsule TAKE FOUR CAPSULES BY MOUTH ONE HOUR BEFORE APPOINTMENT      Biotin 2.5 MG Tab Take 5,000 mcg by mouth daily.      Flaxseed Oil (Linseed Oil) Oil Take by mouth daily.       cholecalciferol (VITAMIN D3) 1000 UNITS tablet Take 1,000 Units by mouth daily.      Omega-3 Fatty Acids (OMEGA 3 PO) Take 1,000 mg by mouth. As directed.       Daily Multiple Vitamins TABS Take 1 tablet by mouth daily.      aspirin 81 MG tablet Take 81 mg by mouth daily.       No current facility-administered medications for this visit.       ALLERGIES:  ALLERGIES:   Allergen Reactions    Zithromax [Azithromycin Dihydrate] HIVES     Z-Montrell       REVIEW OF SYSTEMS:  Per history of present illness and as follows: none  All other systems are reviewed and negative.    PHYSICAL EXAMINATION:  Vital Signs:   Vitals:    05/16/24 0953   BP: 123/64   Pulse: (!) 60   Resp: 16   Weight: 67.5 kg (148 lb 14.7 oz)       HEENT: External ears and nose are normal to inspection. Her oropharynx is pink. There is no scleral icterus.   NECK: Supple with no adenopathy or thyromegaly.   LUNGS: Clear bilaterally.  HEART: Demonstrates a regular rate and rhythm.   ABDOMEN: Soft and nontender with no hepatosplenomegaly.  MUSCULOSKELETAL: Gait is normal.   PSYCHIATRIC: She is appropriate in mood and affect.   BREASTS: She has about a D size breast. The left breast is normal to inspection and palpation  with no palpable masses, nipple discharge, or skin changes. The  right breast demonstrates a lumpectomy scar at the superior aspect of the areola.  She does have mild flattening at this site with evidence of palpable fat necrosis.  The remainder of the right breast is normal to inspection and palpation with no palpable masses, nipple discharge, or skin changes. There is no axillary adenopathy noted bilaterally      IMAGING:  Bilateral screening mammogram from May 2024 is reviewed by me.  The report is as follows:  DENSITY: There are scattered areas of fibroglandular density.     FINDINGS: Asymmetries and calcifications are stable. There are no  suspicious findings. Right breast lumpectomy site is stable.        IMPRESSION: No mammographic evidence for malignancy.      RECOMMENDATION: Annual mammograms are recommended for this patient with  personal history of breast cancer.     BI-RADS Category 2: Benign.      IMPRESSION:  This is a 72 year old female status post right partial mastectomy and sentinel lymph node biopsy performed June 1, 2022 a 2 site invasive lobular carcinoma.  The patient did not require chemotherapy and did not undergo radiation therapy.    RECOMMENDATIONS:        I will see her back after her screening mammogram in May of 2025.  She is to call with any questions or concerns.  I reviewed ongoing breast awareness with the patient. I discussed that should she identify a new mass, lump, thickening, or hardening in either breast, that she should seek medical attention. Additionally I reviewed signs and symptoms that may be associated with a systemic recurrence of breast cancer. Should she experience any persistent new symptoms such as new bone pain, shortness of breath, new abdominal pain, new weakness, or double vision, she is advised to seek medical attention. If she continues to feel well I will plan to see her back in 12 months as part of her ongoing survival surveillance.        The patient  indicated understanding of the diagnosis and agreed with the plan of care. They are encouraged to call our office with any additional questions or concerns.      Instructions provided as documented in the After Visit Summary.    Joanna Moore MD  Date of Surgery:  June 2022      CC: Kaci Kearns, NP

## 2025-03-03 NOTE — CARE COORDINATION
3/3/2025  Social Work Discharge Planning:Plan is to go to [facility] at discharge. Will need a new precert. SW prompted therapy for updates. NAINA, 7000 and transport form are in chart. Electronically signed by WINDY Ramirez on 3/3/2025 at 10:07 AM

## 2025-03-04 ENCOUNTER — TELEPHONE (OUTPATIENT)
Dept: CARDIOLOGY CLINIC | Age: 80
End: 2025-03-04

## 2025-03-04 NOTE — TELEPHONE ENCOUNTER
----- Message from Nancy COLON sent at 2/26/2025  2:16 PM EST -----  Posthospital cardiology follow-up in 2 to 4 weeks for HF and AF in [place] office with primary cardiologist or DIPIKA.  Thanks

## 2025-03-05 ENCOUNTER — APPOINTMENT (OUTPATIENT)
Dept: GENERAL RADIOLOGY | Age: 80
DRG: 291 | End: 2025-03-05
Payer: MEDICARE

## 2025-03-05 ENCOUNTER — HOSPITAL ENCOUNTER (INPATIENT)
Age: 80
LOS: 7 days | Discharge: HOME | DRG: 291 | End: 2025-03-12
Attending: STUDENT IN AN ORGANIZED HEALTH CARE EDUCATION/TRAINING PROGRAM | Admitting: INTERNAL MEDICINE
Payer: MEDICARE

## 2025-03-05 DIAGNOSIS — I50.9 ACUTE HEART FAILURE, UNSPECIFIED HEART FAILURE TYPE (HCC): ICD-10-CM

## 2025-03-05 DIAGNOSIS — I50.9 ACUTE CONGESTIVE HEART FAILURE, UNSPECIFIED HEART FAILURE TYPE (HCC): ICD-10-CM

## 2025-03-05 DIAGNOSIS — J96.02 ACUTE RESPIRATORY FAILURE WITH HYPOXIA AND HYPERCAPNIA: Primary | ICD-10-CM

## 2025-03-05 DIAGNOSIS — J96.01 ACUTE RESPIRATORY FAILURE WITH HYPOXIA AND HYPERCAPNIA: Primary | ICD-10-CM

## 2025-03-05 DIAGNOSIS — B33.8 RESPIRATORY SYNCYTIAL VIRUS (RSV): ICD-10-CM

## 2025-03-05 PROBLEM — J21.0 RSV (ACUTE BRONCHIOLITIS DUE TO RESPIRATORY SYNCYTIAL VIRUS): Status: ACTIVE | Noted: 2025-03-05

## 2025-03-05 LAB
ALBUMIN SERPL-MCNC: 3.6 G/DL (ref 3.5–5.2)
ALP SERPL-CCNC: 80 U/L (ref 35–104)
ALT SERPL-CCNC: 13 U/L (ref 0–32)
ANION GAP SERPL CALCULATED.3IONS-SCNC: 6 MMOL/L (ref 7–16)
AST SERPL-CCNC: 15 U/L (ref 0–31)
B PARAP IS1001 DNA NPH QL NAA+NON-PROBE: NOT DETECTED
B PERT DNA SPEC QL NAA+PROBE: NOT DETECTED
B.E.: 19 MMOL/L (ref -3–3)
B.E.: 20.9 MMOL/L (ref -3–3)
B.E.: 22.5 MMOL/L (ref -3–3)
B.E.: 24.8 MMOL/L (ref -3–3)
BASOPHILS # BLD: 0.03 K/UL (ref 0–0.2)
BASOPHILS NFR BLD: 1 % (ref 0–2)
BILIRUB SERPL-MCNC: 0.6 MG/DL (ref 0–1.2)
BNP SERPL-MCNC: 6427 PG/ML (ref 0–450)
BUN SERPL-MCNC: 55 MG/DL (ref 6–23)
C PNEUM DNA NPH QL NAA+NON-PROBE: NOT DETECTED
CALCIUM SERPL-MCNC: 9.6 MG/DL (ref 8.6–10.2)
CHLORIDE SERPL-SCNC: 91 MMOL/L (ref 98–107)
CO2 SERPL-SCNC: 48 MMOL/L (ref 22–29)
COHB: 1.6 % (ref 0–1.5)
COHB: 1.7 % (ref 0–1.5)
COHB: 1.7 % (ref 0–1.5)
COHB: 2.1 % (ref 0–1.5)
COMMENT: ABNORMAL
CREAT SERPL-MCNC: 1.4 MG/DL (ref 0.5–1)
CRITICAL: ABNORMAL
CRP SERPL HS-MCNC: 100 MG/L (ref 0–5)
DATE ANALYZED: ABNORMAL
DATE OF COLLECTION: ABNORMAL
EKG ATRIAL RATE: 105 BPM
EKG Q-T INTERVAL: 304 MS
EKG QRS DURATION: 80 MS
EKG QTC CALCULATION (BAZETT): 382 MS
EKG R AXIS: -63 DEGREES
EKG T AXIS: 48 DEGREES
EKG VENTRICULAR RATE: 95 BPM
EOSINOPHIL # BLD: 0.09 K/UL (ref 0.05–0.5)
EOSINOPHILS RELATIVE PERCENT: 2 % (ref 0–6)
ERYTHROCYTE [DISTWIDTH] IN BLOOD BY AUTOMATED COUNT: 16.4 % (ref 11.5–15)
FIO2: 50 %
FLUAV RNA NPH QL NAA+NON-PROBE: NOT DETECTED
FLUBV RNA NPH QL NAA+NON-PROBE: NOT DETECTED
GFR, ESTIMATED: 37 ML/MIN/1.73M2
GLUCOSE SERPL-MCNC: 168 MG/DL (ref 74–99)
HADV DNA NPH QL NAA+NON-PROBE: NOT DETECTED
HCO3: 50.6 MMOL/L (ref 22–26)
HCO3: 54.1 MMOL/L (ref 22–26)
HCO3: 54.9 MMOL/L (ref 22–26)
HCO3: 58.5 MMOL/L (ref 22–26)
HCOV 229E RNA NPH QL NAA+NON-PROBE: NOT DETECTED
HCOV HKU1 RNA NPH QL NAA+NON-PROBE: NOT DETECTED
HCOV NL63 RNA NPH QL NAA+NON-PROBE: NOT DETECTED
HCOV OC43 RNA NPH QL NAA+NON-PROBE: NOT DETECTED
HCT VFR BLD AUTO: 48.8 % (ref 34–48)
HGB BLD-MCNC: 13.4 G/DL (ref 11.5–15.5)
HHB: 1.8 % (ref 0–5)
HHB: 2.8 % (ref 0–5)
HHB: 3.4 % (ref 0–5)
HHB: 3.6 % (ref 0–5)
HMPV RNA NPH QL NAA+NON-PROBE: NOT DETECTED
HPIV1 RNA NPH QL NAA+NON-PROBE: NOT DETECTED
HPIV2 RNA NPH QL NAA+NON-PROBE: NOT DETECTED
HPIV3 RNA NPH QL NAA+NON-PROBE: NOT DETECTED
HPIV4 RNA NPH QL NAA+NON-PROBE: NOT DETECTED
IMM GRANULOCYTES # BLD AUTO: 0.05 K/UL (ref 0–0.58)
IMM GRANULOCYTES NFR BLD: 1 % (ref 0–5)
INFLUENZA A BY PCR: NOT DETECTED
INFLUENZA B BY PCR: NOT DETECTED
LAB: ABNORMAL
LYMPHOCYTES NFR BLD: 0.37 K/UL (ref 1.5–4)
LYMPHOCYTES RELATIVE PERCENT: 6 % (ref 20–42)
Lab: 1055
Lab: 2100
Lab: 515
Lab: 855
M PNEUMO DNA NPH QL NAA+NON-PROBE: NOT DETECTED
MCH RBC QN AUTO: 26.3 PG (ref 26–35)
MCHC RBC AUTO-ENTMCNC: 27.5 G/DL (ref 32–34.5)
MCV RBC AUTO: 95.9 FL (ref 80–99.9)
METHB: 0.3 % (ref 0–1.5)
MODE: ABNORMAL
MONOCYTES NFR BLD: 0.75 K/UL (ref 0.1–0.95)
MONOCYTES NFR BLD: 13 % (ref 2–12)
NEUTROPHILS NFR BLD: 78 % (ref 43–80)
NEUTS SEG NFR BLD: 4.63 K/UL (ref 1.8–7.3)
O2 CONTENT: 18.3 ML/DL
O2 CONTENT: 19.2 ML/DL
O2 CONTENT: 19.3 ML/DL
O2 CONTENT: 19.5 ML/DL
O2 SATURATION: 96.3 % (ref 92–98.5)
O2 SATURATION: 96.5 % (ref 92–98.5)
O2 SATURATION: 97.1 % (ref 92–98.5)
O2 SATURATION: 98.2 % (ref 92–98.5)
O2HB: 94.2 % (ref 94–97)
O2HB: 94.5 % (ref 94–97)
O2HB: 95.2 % (ref 94–97)
O2HB: 96.2 % (ref 94–97)
OPERATOR ID: 1190
OPERATOR ID: 1190
OPERATOR ID: 5133
OPERATOR ID: ABNORMAL
PATIENT TEMP: 37 C
PCO2: 101.1 MMHG (ref 35–45)
PCO2: 106.2 MMHG (ref 35–45)
PCO2: 113.7 MMHG (ref 35–45)
PCO2: 119.1 MMHG (ref 35–45)
PEEP/CPAP: 8 CMH2O
PEEP/CPAP: 8 CMH2O
PFO2: 1.77 MMHG/%
PFO2: 1.87 MMHG/%
PFO2: 2.12 MMHG/%
PH BLOOD GAS: 7.29 (ref 7.35–7.45)
PH BLOOD GAS: 7.31 (ref 7.35–7.45)
PH BLOOD GAS: 7.32 (ref 7.35–7.45)
PH BLOOD GAS: 7.33 (ref 7.35–7.45)
PLATELET, FLUORESCENCE: 131 K/UL (ref 130–450)
PMV BLD AUTO: 11.5 FL (ref 7–12)
PO2: 106 MMHG (ref 75–100)
PO2: 87.4 MMHG (ref 75–100)
PO2: 88.4 MMHG (ref 75–100)
PO2: 93.3 MMHG (ref 75–100)
POTASSIUM SERPL-SCNC: 4 MMOL/L (ref 3.5–5)
PROCALCITONIN SERPL-MCNC: 0.16 NG/ML (ref 0–0.08)
PROT SERPL-MCNC: 7.1 G/DL (ref 6.4–8.3)
RBC # BLD AUTO: 5.09 M/UL (ref 3.5–5.5)
RBC # BLD: ABNORMAL 10*6/UL
RI(T): 1.24
RI(T): 1.52
RI(T): 1.6
RR MECHANICAL: 16 B/MIN
RR MECHANICAL: 18 B/MIN
RR MECHANICAL: 24 B/MIN
RSV RNA NPH QL NAA+NON-PROBE: DETECTED
RV+EV RNA NPH QL NAA+NON-PROBE: NOT DETECTED
SARS-COV-2 RDRP RESP QL NAA+PROBE: NOT DETECTED
SARS-COV-2 RNA NPH QL NAA+NON-PROBE: NOT DETECTED
SODIUM SERPL-SCNC: 145 MMOL/L (ref 132–146)
SOURCE, BLOOD GAS: ABNORMAL
SPECIMEN DESCRIPTION: ABNORMAL
SPECIMEN DESCRIPTION: NORMAL
THB: 13.6 G/DL (ref 11.5–16.5)
THB: 14.3 G/DL (ref 11.5–16.5)
THB: 14.4 G/DL (ref 11.5–16.5)
THB: 14.5 G/DL (ref 11.5–16.5)
TIME ANALYZED: 1101
TIME ANALYZED: 2110
TIME ANALYZED: 519
TIME ANALYZED: 858
TROPONIN I SERPL HS-MCNC: 34 NG/L (ref 0–9)
VT MECHANICAL: 450 ML
WBC OTHER # BLD: 5.9 K/UL (ref 4.5–11.5)

## 2025-03-05 PROCEDURE — 87635 SARS-COV-2 COVID-19 AMP PRB: CPT

## 2025-03-05 PROCEDURE — 0202U NFCT DS 22 TRGT SARS-COV-2: CPT

## 2025-03-05 PROCEDURE — 2500000003 HC RX 250 WO HCPCS: Performed by: NURSE PRACTITIONER

## 2025-03-05 PROCEDURE — 6360000002 HC RX W HCPCS

## 2025-03-05 PROCEDURE — 2060000000 HC ICU INTERMEDIATE R&B

## 2025-03-05 PROCEDURE — 80053 COMPREHEN METABOLIC PANEL: CPT

## 2025-03-05 PROCEDURE — 99285 EMERGENCY DEPT VISIT HI MDM: CPT

## 2025-03-05 PROCEDURE — 84484 ASSAY OF TROPONIN QUANT: CPT

## 2025-03-05 PROCEDURE — 94660 CPAP INITIATION&MGMT: CPT

## 2025-03-05 PROCEDURE — 83880 ASSAY OF NATRIURETIC PEPTIDE: CPT

## 2025-03-05 PROCEDURE — 6360000002 HC RX W HCPCS: Performed by: INTERNAL MEDICINE

## 2025-03-05 PROCEDURE — 36600 WITHDRAWAL OF ARTERIAL BLOOD: CPT

## 2025-03-05 PROCEDURE — 86140 C-REACTIVE PROTEIN: CPT

## 2025-03-05 PROCEDURE — 85025 COMPLETE CBC W/AUTO DIFF WBC: CPT

## 2025-03-05 PROCEDURE — 2500000003 HC RX 250 WO HCPCS: Performed by: INTERNAL MEDICINE

## 2025-03-05 PROCEDURE — 84145 PROCALCITONIN (PCT): CPT

## 2025-03-05 PROCEDURE — 82805 BLOOD GASES W/O2 SATURATION: CPT

## 2025-03-05 PROCEDURE — 93010 ELECTROCARDIOGRAM REPORT: CPT | Performed by: INTERNAL MEDICINE

## 2025-03-05 PROCEDURE — 87502 INFLUENZA DNA AMP PROBE: CPT

## 2025-03-05 PROCEDURE — 93005 ELECTROCARDIOGRAM TRACING: CPT

## 2025-03-05 PROCEDURE — 71045 X-RAY EXAM CHEST 1 VIEW: CPT

## 2025-03-05 RX ORDER — FENTANYL CITRATE-0.9 % NACL/PF 10 MCG/ML
25-200 PLASTIC BAG, INJECTION (ML) INTRAVENOUS CONTINUOUS
Status: DISCONTINUED | OUTPATIENT
Start: 2025-03-05 | End: 2025-03-05

## 2025-03-05 RX ORDER — SODIUM CHLORIDE 0.9 % (FLUSH) 0.9 %
5-40 SYRINGE (ML) INJECTION EVERY 12 HOURS SCHEDULED
Status: DISCONTINUED | OUTPATIENT
Start: 2025-03-05 | End: 2025-03-12 | Stop reason: HOSPADM

## 2025-03-05 RX ORDER — METOPROLOL TARTRATE 1 MG/ML
5 INJECTION, SOLUTION INTRAVENOUS EVERY 6 HOURS
Status: DISCONTINUED | OUTPATIENT
Start: 2025-03-05 | End: 2025-03-09

## 2025-03-05 RX ORDER — SODIUM CHLORIDE 9 MG/ML
INJECTION, SOLUTION INTRAVENOUS PRN
Status: DISCONTINUED | OUTPATIENT
Start: 2025-03-05 | End: 2025-03-12 | Stop reason: HOSPADM

## 2025-03-05 RX ORDER — ETOMIDATE 2 MG/ML
36 INJECTION INTRAVENOUS ONCE
Status: DISCONTINUED | OUTPATIENT
Start: 2025-03-05 | End: 2025-03-05

## 2025-03-05 RX ORDER — METOPROLOL SUCCINATE 25 MG/1
25 TABLET, EXTENDED RELEASE ORAL 2 TIMES DAILY
Status: DISCONTINUED | OUTPATIENT
Start: 2025-03-05 | End: 2025-03-12 | Stop reason: HOSPADM

## 2025-03-05 RX ORDER — HEPARIN SODIUM 1000 [USP'U]/ML
2000 INJECTION, SOLUTION INTRAVENOUS; SUBCUTANEOUS PRN
Status: DISCONTINUED | OUTPATIENT
Start: 2025-03-05 | End: 2025-03-09

## 2025-03-05 RX ORDER — MONTELUKAST SODIUM 10 MG/1
10 TABLET ORAL NIGHTLY
Status: DISCONTINUED | OUTPATIENT
Start: 2025-03-05 | End: 2025-03-12 | Stop reason: HOSPADM

## 2025-03-05 RX ORDER — FUROSEMIDE 10 MG/ML
40 INJECTION INTRAMUSCULAR; INTRAVENOUS ONCE
Status: COMPLETED | OUTPATIENT
Start: 2025-03-05 | End: 2025-03-05

## 2025-03-05 RX ORDER — ONDANSETRON 2 MG/ML
4 INJECTION INTRAMUSCULAR; INTRAVENOUS EVERY 6 HOURS PRN
Status: DISCONTINUED | OUTPATIENT
Start: 2025-03-05 | End: 2025-03-12 | Stop reason: HOSPADM

## 2025-03-05 RX ORDER — EPINEPHRINE 0.1 MG/ML
INJECTION INTRAVENOUS
Status: DISCONTINUED
Start: 2025-03-05 | End: 2025-03-05

## 2025-03-05 RX ORDER — AMIODARONE HYDROCHLORIDE 200 MG/1
200 TABLET ORAL 2 TIMES DAILY
Status: DISCONTINUED | OUTPATIENT
Start: 2025-03-05 | End: 2025-03-12 | Stop reason: HOSPADM

## 2025-03-05 RX ORDER — HEPARIN SODIUM 1000 [USP'U]/ML
4000 INJECTION, SOLUTION INTRAVENOUS; SUBCUTANEOUS PRN
Status: DISCONTINUED | OUTPATIENT
Start: 2025-03-05 | End: 2025-03-09

## 2025-03-05 RX ORDER — FENTANYL CITRATE-0.9 % NACL/PF 20 MCG/2ML
50 SYRINGE (ML) INTRAVENOUS EVERY 30 MIN PRN
Status: DISCONTINUED | OUTPATIENT
Start: 2025-03-05 | End: 2025-03-05

## 2025-03-05 RX ORDER — MAGNESIUM SULFATE IN WATER 40 MG/ML
2000 INJECTION, SOLUTION INTRAVENOUS PRN
Status: DISCONTINUED | OUTPATIENT
Start: 2025-03-05 | End: 2025-03-12 | Stop reason: HOSPADM

## 2025-03-05 RX ORDER — POTASSIUM CHLORIDE 1500 MG/1
40 TABLET, EXTENDED RELEASE ORAL PRN
Status: DISCONTINUED | OUTPATIENT
Start: 2025-03-05 | End: 2025-03-12 | Stop reason: HOSPADM

## 2025-03-05 RX ORDER — LEVOTHYROXINE SODIUM 75 UG/1
150 TABLET ORAL DAILY
Status: DISCONTINUED | OUTPATIENT
Start: 2025-03-06 | End: 2025-03-12 | Stop reason: HOSPADM

## 2025-03-05 RX ORDER — ACETAMINOPHEN 650 MG/1
650 SUPPOSITORY RECTAL EVERY 6 HOURS PRN
Status: DISCONTINUED | OUTPATIENT
Start: 2025-03-05 | End: 2025-03-12 | Stop reason: HOSPADM

## 2025-03-05 RX ORDER — ONDANSETRON 4 MG/1
4 TABLET, ORALLY DISINTEGRATING ORAL EVERY 8 HOURS PRN
Status: DISCONTINUED | OUTPATIENT
Start: 2025-03-05 | End: 2025-03-12 | Stop reason: HOSPADM

## 2025-03-05 RX ORDER — HEPARIN SODIUM 10000 [USP'U]/100ML
5-30 INJECTION, SOLUTION INTRAVENOUS CONTINUOUS
Status: DISCONTINUED | OUTPATIENT
Start: 2025-03-05 | End: 2025-03-09

## 2025-03-05 RX ORDER — ROCURONIUM BROMIDE 10 MG/ML
150 INJECTION, SOLUTION INTRAVENOUS ONCE
Status: DISCONTINUED | OUTPATIENT
Start: 2025-03-05 | End: 2025-03-05

## 2025-03-05 RX ORDER — FUROSEMIDE 10 MG/ML
20 INJECTION INTRAMUSCULAR; INTRAVENOUS 2 TIMES DAILY
Status: DISCONTINUED | OUTPATIENT
Start: 2025-03-05 | End: 2025-03-06

## 2025-03-05 RX ORDER — IPRATROPIUM BROMIDE AND ALBUTEROL SULFATE 2.5; .5 MG/3ML; MG/3ML
1 SOLUTION RESPIRATORY (INHALATION) EVERY 4 HOURS PRN
Status: DISCONTINUED | OUTPATIENT
Start: 2025-03-05 | End: 2025-03-12 | Stop reason: HOSPADM

## 2025-03-05 RX ORDER — SODIUM CHLORIDE 0.9 % (FLUSH) 0.9 %
10 SYRINGE (ML) INJECTION PRN
Status: DISCONTINUED | OUTPATIENT
Start: 2025-03-05 | End: 2025-03-12 | Stop reason: HOSPADM

## 2025-03-05 RX ORDER — ACETAMINOPHEN 325 MG/1
650 TABLET ORAL EVERY 6 HOURS PRN
Status: DISCONTINUED | OUTPATIENT
Start: 2025-03-05 | End: 2025-03-12 | Stop reason: HOSPADM

## 2025-03-05 RX ORDER — HEPARIN SODIUM 1000 [USP'U]/ML
4000 INJECTION, SOLUTION INTRAVENOUS; SUBCUTANEOUS ONCE
Status: COMPLETED | OUTPATIENT
Start: 2025-03-05 | End: 2025-03-06

## 2025-03-05 RX ORDER — POTASSIUM CHLORIDE 7.45 MG/ML
10 INJECTION INTRAVENOUS PRN
Status: DISCONTINUED | OUTPATIENT
Start: 2025-03-05 | End: 2025-03-12 | Stop reason: HOSPADM

## 2025-03-05 RX ADMIN — SODIUM CHLORIDE, PRESERVATIVE FREE 10 ML: 5 INJECTION INTRAVENOUS at 20:18

## 2025-03-05 RX ADMIN — FUROSEMIDE 20 MG: 10 INJECTION, SOLUTION INTRAMUSCULAR; INTRAVENOUS at 20:17

## 2025-03-05 RX ADMIN — FUROSEMIDE 40 MG: 10 INJECTION, SOLUTION INTRAMUSCULAR; INTRAVENOUS at 06:38

## 2025-03-05 RX ADMIN — MICONAZOLE NITRATE: 20 POWDER TOPICAL at 20:19

## 2025-03-05 ASSESSMENT — LIFESTYLE VARIABLES
HOW MANY STANDARD DRINKS CONTAINING ALCOHOL DO YOU HAVE ON A TYPICAL DAY: PATIENT DOES NOT DRINK
HOW OFTEN DO YOU HAVE A DRINK CONTAINING ALCOHOL: NEVER

## 2025-03-05 NOTE — ED PROVIDER NOTES
B-mode/gray scaled imaging and Doppler spectral analysis and color flow was obtained of the deep venous structures of the bilateral extremities. COMPARISON: None. HISTORY: ORDERING SYSTEM PROVIDED HISTORY: Edema left entire leg TECHNOLOGIST PROVIDED HISTORY: What reading provider will be dictating this exam?->CRC FINDINGS: The visualized veins of the bilateral lower extremities are patent and free of echogenic thrombus. The veins demonstrate good compressibility with normal color flow study and spectral analysis.     No evidence of DVT in either lower extremity.     CHUNG with possible cardioversion (with contrast PRN)    Result Date: 2/26/2025    Left Ventricle: The EF by visual approximation is 50 - 55%. Normal wall motion.   Right Ventricle: Moderately reduced systolic function.   Aortic Valve: Bicuspid valve with complete commisural fusion of the right and left cusps. Moderately calcified cusps. Trace regurgitation. Stenosis of the aortic valve. AV Area by Planimetry is 1.4 cm2.   Mitral Valve: Mild annular calcification.   Tricuspid Valve: Mild to moderate regurgitation.   Left Atrium: Decreased appendage flow velocity. No left atrial appendage thrombus noted.   Right Atrium: No thrombus or mass visualized.   Aorta: Mild-moderate diffuse atherosclerosis present in the descending aorta.   Successful direct current cardioversion to normal sinus rhythm at 300 J.   Image quality is adequate.       No results found.    PROCEDURES   Unless otherwise noted below, none          CRITICAL CARE TIME (.cct)   Pertinent physician    PAST MEDICAL HISTORY/Chronic Conditions Affecting Care      has a past medical history of Dyspnea on exertion, Family history of heart disease, Hyperlipidemia, Hypertension, and Mild aortic stenosis (05/10/2013).     EMERGENCY DEPARTMENT COURSE    Vitals:    Vitals:    03/05/25 0501   BP: (!) 136/100   Pulse: 98   Resp: 26   Temp: 98.6 °F (37 °C)   TempSrc: Oral   SpO2: 95%   Weight: 126.6 kg (279  1.4.  Troponin 34.  proBNP elevated 6427.  Patient was given Lasix.  Chest x-ray shows evidence of pulmonary edema.  Influenza swab is negative.  COVID swab is negative.    For possible multifocal pneumonia, patient is afebrile and has no leukocytosis, clinically, the patient is fluid overloaded.  More concerning for pulmonary edema.    Case discussed with hospitalist team who accept patient for admission.  At the time of admission, the patient was in stable condition.    CONSULTS: (Who and What was discussed)  IP CONSULT TO INTERNAL MEDICINE        I am the Primary Clinician of Record.    FINAL IMPRESSION      1. Acute respiratory failure with hypoxia and hypercapnia (HCC)    2. Acute congestive heart failure, unspecified heart failure type (HCC)          DISPOSITION/PLAN     DISPOSITION Admitted 03/05/2025 06:14:05 AM      PATIENT REFERRED TO:  No follow-up provider specified.    DISCHARGE MEDICATIONS:  New Prescriptions    No medications on file       DISCONTINUED MEDICATIONS:  Discontinued Medications    No medications on file              Patient was seen and evaluated by myself and my attending Dwight Avendano MD. Assessment and Plan discussed with attending provider, please see attestation for final plan of care.     (Please note that portions of this note were completed with a voice recognition program.  Efforts were made to edit the dictations but occasionally words are mis-transcribed.)    Alisa Chakraborty MD (electronically signed)

## 2025-03-05 NOTE — ED NOTES
ED to Inpatient Handoff Report    Notified Tiana that electronic handoff available and patient ready for transport to room 436.    Safety Risks: Risk of falls    Patient in Restraints: no    Constant Observer or Patient : no    Telemetry Monitoring Ordered :Yes      Cardiac Rhythm: Atrial fib    Order to transfer to unit without monitor:NO    Last MEWS: 1 Time completed: 1753    Deterioration Index Score:   Predictive Model Details          40 (Caution)  Factor Value    Calculated 3/5/2025 17:57 31% Age 79 years old    Deterioration Index Model 27% Supplemental oxygen PAP (positive airway pressure)     13% Respiratory rate 20     10% Sodium 145 mmol/L     10% Cardiac rhythm Atrial fib     2% BUN abnormal (55 mg/dL)     2% Blood pH abnormal (7.317)     2% Systolic 120     1% Pulse 88     1% Hematocrit abnormal (48.8 %)     0% Potassium 4.0 mmol/L     0% Temperature 97.5 °F (36.4 °C)     0% WBC count 5.9 k/uL     0% Pulse oximetry 95 %        Vitals:    03/05/25 1402 03/05/25 1429 03/05/25 1459 03/05/25 1753   BP: 119/89 115/76 124/70 120/75   Pulse: 77 77 72 88   Resp: 17 17 16 20   Temp:    97.5 °F (36.4 °C)   TempSrc:    Oral   SpO2:    95%   Weight:       Height:             Opportunity for questions and clarification was provided.

## 2025-03-05 NOTE — H&P
[facility]   History and Physical      CHIEF COMPLAINT:  SOB    Reason for Admission:  respiratory acidosis    History Obtained From:  patient, electronic medical record    HISTORY OF PRESENT ILLNESS:      The patient is a 79 y.o. female of Fuentes Herrera MD with significant past medical history of HLD, HTN, aortic stenosis, Atrial fib who presents with worsening SOB.    Just discharged 2 days ago for volume overload with SOB. Also new onset A fib.  7 L net removed with diuresis.Rate controlled with amio and BB.  Used bipap here but refused to wear for last few days.  Discharged to SNF on 3L O2.    Patient currently wearing bipap.  Sent from SNF due to worsening SOB and O2 need. Was 80% on 5L.  Patient reportedly said SNF not giving her the lasix.  Significant resp acidosis - put on Bipap.  Concern about mentation and worsening CO2, ER was about to intubate but mentation and ABG improved.    Awakens easily to voice.  Not able to talk much.    All labs personally reviewed   All imaging personally reviewed     Past Medical History:        Diagnosis Date    Atrial fibrillation (HCC)     Dyspnea on exertion     Family history of heart disease     Hyperlipidemia     Hypertension     Mild aortic stenosis 05/10/2013     Past Surgical History:        Procedure Laterality Date    TUBAL LIGATION           Medications Prior to Admission:    Not in a hospital admission.    Allergies:  Patient has no known allergies.    Social History:   TOBACCO:   reports that she quit smoking about 60 years ago. Her smoking use included cigarettes. She started smoking about 61 years ago. She has a 0.3 pack-year smoking history. She has never used smokeless tobacco.  ETOH:   reports no history of alcohol use.  MARITAL STATUS:    OCCUPATION:      Family History:       Problem Relation Age of Onset    Heart Attack Brother        REVIEW OF SYSTEMS:    Review of Systems   Unable to perform ROS: Other    On bipap.    Vitals:  BP

## 2025-03-05 NOTE — CONSULTS
Rafa Ramires M.D.,[facility]  Sher Corebtt D.O., AYAN., [facility]  Tiara Griffin M.D.  Ellen Madrid M.D.   Jose Luis Caballero D.O.  Petey Thibodeaux M.D.       Patient:  Aggie Carter 79 y.o. female MRN: [id number]           PULMONARY CONSULTATION    Reason for Consultation: recurrent resp acidosis on bipap.  help titrate bipap to be used in SNF     Referring Physician:  Dr Dwight Avendano MD    Communication with the referring physician will be sent via the electronic medical record.    Chief Complaint: shortness of breath     CODE STATUS: FULL     SUBJECTIVE:  HPI:  Aggie Carter is a 79 y.o. female who we are asked to evaluate for recurrent respiratory acidosis on BiPAP, assistance with titration of BiPAP settings.  Patient is not a reliable historian therefore information has been obtained from review of her medical record.    She is not previously known to our pulmonary service.  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  No PFTs on file, no history of COPD.  Patient states she has a history of asthma and uses albuterol as needed at home.    She had a recent hospital stay 2/19/2025 through 3/3/2025 treated for fluid volume overload, acute respiratory insufficiency and new onset A-fib.  She underwent CHUNG cardioversion with success to sinus rhythm , returned back to A-fib patient was improving clinically with diuresis.  2D echo completed with moderate LVH, EF 60%.  Mild JARED baseline creatinine 1.0, creatinine 1.8 improved to 1.5 prior to discharge.  Rate controlled with Toprol-XL placed on anticoagulation Eliquis 2.5 mg twice daily., ACE inhibitor held.    She discharged to SNF.  She presented back to the ED today on 3/5/2025 with worsening respiratory distress.She was  were discussed and adjusted where necessary. Key issues of the case were discussed among consultants.  Review of CNP documentation was conducted and revisions were made as appropriate. I agree with the above documented exam, problem list and plan of care with the following additions:    Cycle on AVAPS, on 4 hours and off 4 hours. Should wear it continuously overnight. Suspected long-standing OHS/MELISSA as baseline pCO2 appears to be around 100.     Petey Thibodeaux MD

## 2025-03-05 NOTE — ED PROVIDER NOTES
3/5/25  9:22 AM EST      Patient presently admitted and boarded in the department pending bed availability.  Intervention required by emergency physician.     Interval HPI:.  Asked to evaluate as patient seemed to have worsening respiratory status and mentation while on BiPAP machine    Interval physical exam:     Constitutional/General: Somewhat difficult to arouse requiring sternal rub but did answer her name; Morbidly obese  Head: Normocephalic and atraumatic  Eyes: PERRL, EOMI, sclera non icteric  Mouth: Oropharynx clear, handling secretions,   Neck: Supple, full ROM, no stridor, no meningeal signs  Respiratory: Lungs clear to auscultation bilaterally,Not in respiratory distress    Cardiovascular:  Regular rate. Regular rhythm.  2+ distal pulses. Equal extremity pulses.    GI:  Abdomen Soft, Non tender, Non distended. No rebound, guarding, or rigidity.   Musculoskeletal: Moves all extremities x 4. Warm and well perfused,  Capillary refill <3 seconds  Integument: skin warm and dry. No rashes.   Neurologic: Glascow Coma Scale  Best Eye Response 4 - Opens eyes on own   Best Verbal Response 5 - Alert and oriented   Best Motor Response 6 - Follows simple motor commands   Total 15         Medications   furosemide (LASIX) injection 40 mg (40 mg IntraVENous Given 3/5/25 0638)       Vitals:    03/05/25 0900   BP: 124/89   Pulse: 82   Resp: 20   Temp:    SpO2: 96%         Oxygen Saturation Interpretation: Improved after treatment    The cardiac monitor revealed sinus with a heart rate in the 90s 100s as interpreted by me. The cardiac monitor was ordered secondary to the patient's heart rate and to monitor the patient for dysrhythmia.     MDM: Patient appeared borderline obtunded on my evaluation performed repeat ABG showed worsening CO2 status plan was for an intubation..  While in the room and after receiving consent from patient's POA and while setting up for intubation patient seemed to have a improvement of her  mentation was answering questions appropriately decision was made to discontinue plan for intubation we made some adjustments to patient's BiPAP furthermore and patient's CO2 status improved her mentation continued to improve it is noted the patient is positive for RSV after initially being excepted to the ICU with plan for intubation patient was downgraded back to intermediate bed with admission to Dr. Avendano service    Please note that the withdrawal or failure to initiate urgent interventions for this patient would likely result in a life threatening deterioration or permanent disability.      Accordingly this patient received 30 minutes of critical care time, excluding separately billable procedures.      1. Acute respiratory failure with hypoxia and hypercapnia (HCC)    2. Acute congestive heart failure, unspecified heart failure type (HCC)    3. Respiratory syncytial virus (RSV)             Esteban Pruitt DO  03/05/25 1153

## 2025-03-05 NOTE — PROGRESS NOTES
4 Eyes Skin Assessment     NAME:  Aggie Carter  YOB: 1945  MEDICAL RECORD NUMBER:  [id number]    The patient is being assessed for  Admission    I agree that at least one RN has performed a thorough Head to Toe Skin Assessment on the patient. ALL assessment sites listed below have been assessed.      Areas assessed by both nurses:    Head, Face, Ears, Shoulders, Back, Chest, Arms, Elbows, Hands, Sacrum. Buttock, Coccyx, Ischium, and Legs. Feet and Heels        Does the Patient have a Wound? Yes wound(s) were present on assessment. LDA wound assessment was Initiated and completed by RN       Tristen Prevention initiated by RN: Yes  Wound Care Orders initiated by RN: Yes    Pressure Injury (Stage 3,4, Unstageable, DTI, NWPT, and Complex wounds) if present, place Wound referral order by RN under : yes    New Ostomies, if present place, Ostomy referral order under : No     Nurse 1 eSignature: Electronically signed by Windy Cheng RN on 3/5/25 at 6:54 PM EST    **SHARE this note so that the co-signing nurse can place an eSignature**    Nurse 2 eSignature: Electronically signed by Windy Naylor RN on 3/5/25 at 6:55 PM EST

## 2025-03-06 ENCOUNTER — APPOINTMENT (OUTPATIENT)
Dept: GENERAL RADIOLOGY | Age: 80
DRG: 291 | End: 2025-03-06
Payer: MEDICARE

## 2025-03-06 LAB
ANION GAP SERPL CALCULATED.3IONS-SCNC: 5 MMOL/L (ref 7–16)
B.E.: 20.1 MMOL/L (ref -3–3)
B.E.: 25.1 MMOL/L (ref -3–3)
B.E.: 25.4 MMOL/L (ref -3–3)
BASOPHILS # BLD: 0 K/UL (ref 0–0.2)
BASOPHILS NFR BLD: 0 % (ref 0–2)
BUN SERPL-MCNC: 52 MG/DL (ref 6–23)
CALCIUM SERPL-MCNC: 9.4 MG/DL (ref 8.6–10.2)
CHLORIDE SERPL-SCNC: 96 MMOL/L (ref 98–107)
CHOLEST SERPL-MCNC: 127 MG/DL
CO2 SERPL-SCNC: 48 MMOL/L (ref 22–29)
COHB: 1.5 % (ref 0–1.5)
COHB: 1.6 % (ref 0–1.5)
COHB: 1.6 % (ref 0–1.5)
COMMENT: ABNORMAL
CREAT SERPL-MCNC: 1.3 MG/DL (ref 0.5–1)
CRITICAL ACTION: NORMAL
CRITICAL NOTIFICATION DATE/TIME: NORMAL
CRITICAL NOTIFICATION: NORMAL
CRITICAL VALUE READ BACK: YES
CRITICAL: ABNORMAL
DATE ANALYZED: ABNORMAL
DATE OF COLLECTION: ABNORMAL
EOSINOPHIL # BLD: 0.04 K/UL (ref 0.05–0.5)
EOSINOPHILS RELATIVE PERCENT: 1 % (ref 0–6)
ERYTHROCYTE [DISTWIDTH] IN BLOOD BY AUTOMATED COUNT: 16.3 % (ref 11.5–15)
ERYTHROCYTE [DISTWIDTH] IN BLOOD BY AUTOMATED COUNT: 16.3 % (ref 11.5–15)
FERRITIN SERPL-MCNC: 612 NG/ML
FIO2: 50 %
FLOW RATE: 4
GFR, ESTIMATED: 42 ML/MIN/1.73M2
GLUCOSE SERPL-MCNC: 105 MG/DL (ref 74–99)
HCO3: 52 MMOL/L (ref 22–26)
HCO3: 56.6 MMOL/L (ref 22–26)
HCO3: 59.9 MMOL/L (ref 22–26)
HCT VFR BLD AUTO: 46.7 % (ref 34–48)
HCT VFR BLD AUTO: 47.6 % (ref 34–48)
HDLC SERPL-MCNC: 32 MG/DL
HGB BLD-MCNC: 13.2 G/DL (ref 11.5–15.5)
HGB BLD-MCNC: 13.4 G/DL (ref 11.5–15.5)
HHB: 3.6 % (ref 0–5)
HHB: 4.9 % (ref 0–5)
HHB: 8 % (ref 0–5)
IRON SATN MFR SERPL: 19 % (ref 15–50)
IRON SERPL-MCNC: 49 UG/DL (ref 37–145)
LAB: ABNORMAL
LDLC SERPL CALC-MCNC: 77 MG/DL
LYMPHOCYTES NFR BLD: 0.17 K/UL (ref 1.5–4)
LYMPHOCYTES RELATIVE PERCENT: 4 % (ref 20–42)
Lab: 1630
Lab: 600
Lab: 855
MAGNESIUM SERPL-MCNC: 2 MG/DL (ref 1.6–2.6)
MCH RBC QN AUTO: 27.2 PG (ref 26–35)
MCH RBC QN AUTO: 27.3 PG (ref 26–35)
MCHC RBC AUTO-ENTMCNC: 28.2 G/DL (ref 32–34.5)
MCHC RBC AUTO-ENTMCNC: 28.3 G/DL (ref 32–34.5)
MCV RBC AUTO: 96.1 FL (ref 80–99.9)
MCV RBC AUTO: 96.9 FL (ref 80–99.9)
METHB: 0.3 % (ref 0–1.5)
MODE: ABNORMAL
MONOCYTES NFR BLD: 0.47 K/UL (ref 0.1–0.95)
MONOCYTES NFR BLD: 10 % (ref 2–12)
NEUTROPHILS NFR BLD: 86 % (ref 43–80)
NEUTS SEG NFR BLD: 4.22 K/UL (ref 1.8–7.3)
O2 CONTENT: 18.1 ML/DL
O2 CONTENT: 18.7 ML/DL
O2 CONTENT: 18.8 ML/DL
O2 DELIVERY DEVICE: ABNORMAL
O2 SATURATION: 91.8 % (ref 92–98.5)
O2 SATURATION: 95 % (ref 92–98.5)
O2 SATURATION: 96.3 % (ref 92–98.5)
O2HB: 90.1 % (ref 94–97)
O2HB: 93.3 % (ref 94–97)
O2HB: 94.5 % (ref 94–97)
OPERATOR ID: 420
OPERATOR ID: 7296
OPERATOR ID: ABNORMAL
PARTIAL THROMBOPLASTIN TIME: 39.5 SEC (ref 24.5–35.1)
PARTIAL THROMBOPLASTIN TIME: 57 SEC (ref 24.5–35.1)
PARTIAL THROMBOPLASTIN TIME: 67.3 SEC (ref 24.5–35.1)
PATIENT TEMP: 37 C
PCO2: 102.5 MMHG (ref 35–45)
PCO2: 130.5 MMHG (ref 35–45)
PCO2: 97.4 MMHG (ref 35–45)
PEEP/CPAP: 5 CMH2O
PEEP/CPAP: 8 CMH2O
PEEP/CPAP: 8 CMH2O
PFO2: 1.35 MMHG/%
PFO2: 1.55 MMHG/%
PFO2: 1.66 MMHG/%
PH BLOOD GAS: 7.28 (ref 7.35–7.45)
PH BLOOD GAS: 7.32 (ref 7.35–7.45)
PH BLOOD GAS: 7.38 (ref 7.35–7.45)
PLATELET, FLUORESCENCE: 122 K/UL (ref 130–450)
PLATELET, FLUORESCENCE: 123 K/UL (ref 130–450)
PMV BLD AUTO: 10.7 FL (ref 7–12)
PMV BLD AUTO: 11.5 FL (ref 7–12)
PO2: 67.3 MMHG (ref 75–100)
PO2: 77.7 MMHG (ref 75–100)
PO2: 83.1 MMHG (ref 75–100)
POC HCO3: 56.5 MMOL/L (ref 22–26)
POC O2 SATURATION: 86.1 % (ref 92–98.5)
POC PCO2: 113 MM HG (ref 35–45)
POC PH: 7.31 (ref 7.35–7.45)
POC PO2: 62.9 MM HG (ref 60–80)
POSITIVE BASE EXCESS, ART: 21.6 MMOL/L (ref 0–3)
POTASSIUM SERPL-SCNC: 3.9 MMOL/L (ref 3.5–5)
RBC # BLD AUTO: 4.86 M/UL (ref 3.5–5.5)
RBC # BLD AUTO: 4.91 M/UL (ref 3.5–5.5)
RBC # BLD: ABNORMAL 10*6/UL
RI(T): 1.97
RI(T): 2.1
RI(T): 2.12
RR MECHANICAL: 14 B/MIN
RR MECHANICAL: 22 B/MIN
RR MECHANICAL: 22 B/MIN
SODIUM SERPL-SCNC: 149 MMOL/L (ref 132–146)
SOURCE, BLOOD GAS: ABNORMAL
T4 FREE SERPL-MCNC: 1.3 NG/DL (ref 0.9–1.7)
THB: 14.1 G/DL (ref 11.5–16.5)
THB: 14.2 G/DL (ref 11.5–16.5)
THB: 14.3 G/DL (ref 11.5–16.5)
TIBC SERPL-MCNC: 260 UG/DL (ref 250–450)
TIME ANALYZED: 1642
TIME ANALYZED: 613
TIME ANALYZED: 901
TRIGL SERPL-MCNC: 92 MG/DL
TSH SERPL DL<=0.05 MIU/L-ACNC: 1.23 UIU/ML (ref 0.27–4.2)
VLDLC SERPL CALC-MCNC: 18 MG/DL
VT MECHANICAL: 450 ML
VT MECHANICAL: 500 ML
VT MECHANICAL: 500 ML
WBC OTHER # BLD: 4.7 K/UL (ref 4.5–11.5)
WBC OTHER # BLD: 4.9 K/UL (ref 4.5–11.5)

## 2025-03-06 PROCEDURE — 2700000000 HC OXYGEN THERAPY PER DAY

## 2025-03-06 PROCEDURE — 83550 IRON BINDING TEST: CPT

## 2025-03-06 PROCEDURE — 36415 COLL VENOUS BLD VENIPUNCTURE: CPT

## 2025-03-06 PROCEDURE — 2060000000 HC ICU INTERMEDIATE R&B

## 2025-03-06 PROCEDURE — 6360000002 HC RX W HCPCS: Performed by: INTERNAL MEDICINE

## 2025-03-06 PROCEDURE — 82728 ASSAY OF FERRITIN: CPT

## 2025-03-06 PROCEDURE — 80048 BASIC METABOLIC PNL TOTAL CA: CPT

## 2025-03-06 PROCEDURE — 6360000002 HC RX W HCPCS: Performed by: NURSE PRACTITIONER

## 2025-03-06 PROCEDURE — 85027 COMPLETE CBC AUTOMATED: CPT

## 2025-03-06 PROCEDURE — 83735 ASSAY OF MAGNESIUM: CPT

## 2025-03-06 PROCEDURE — 36600 WITHDRAWAL OF ARTERIAL BLOOD: CPT

## 2025-03-06 PROCEDURE — 82803 BLOOD GASES ANY COMBINATION: CPT

## 2025-03-06 PROCEDURE — 2500000003 HC RX 250 WO HCPCS: Performed by: NURSE PRACTITIONER

## 2025-03-06 PROCEDURE — 83540 ASSAY OF IRON: CPT

## 2025-03-06 PROCEDURE — 80061 LIPID PANEL: CPT

## 2025-03-06 PROCEDURE — 94640 AIRWAY INHALATION TREATMENT: CPT

## 2025-03-06 PROCEDURE — 2580000003 HC RX 258: Performed by: INTERNAL MEDICINE

## 2025-03-06 PROCEDURE — 87899 AGENT NOS ASSAY W/OPTIC: CPT

## 2025-03-06 PROCEDURE — 85025 COMPLETE CBC W/AUTO DIFF WBC: CPT

## 2025-03-06 PROCEDURE — 82805 BLOOD GASES W/O2 SATURATION: CPT

## 2025-03-06 PROCEDURE — 6370000000 HC RX 637 (ALT 250 FOR IP): Performed by: INTERNAL MEDICINE

## 2025-03-06 PROCEDURE — 85730 THROMBOPLASTIN TIME PARTIAL: CPT

## 2025-03-06 PROCEDURE — 71045 X-RAY EXAM CHEST 1 VIEW: CPT

## 2025-03-06 PROCEDURE — 94660 CPAP INITIATION&MGMT: CPT

## 2025-03-06 PROCEDURE — 84443 ASSAY THYROID STIM HORMONE: CPT

## 2025-03-06 PROCEDURE — 84439 ASSAY OF FREE THYROXINE: CPT

## 2025-03-06 RX ORDER — FUROSEMIDE 10 MG/ML
20 INJECTION INTRAMUSCULAR; INTRAVENOUS 2 TIMES DAILY
Status: DISCONTINUED | OUTPATIENT
Start: 2025-03-06 | End: 2025-03-06

## 2025-03-06 RX ORDER — FUROSEMIDE 10 MG/ML
40 INJECTION INTRAMUSCULAR; INTRAVENOUS 2 TIMES DAILY
Status: DISCONTINUED | OUTPATIENT
Start: 2025-03-06 | End: 2025-03-06

## 2025-03-06 RX ORDER — DEXTROSE MONOHYDRATE 50 MG/ML
INJECTION, SOLUTION INTRAVENOUS CONTINUOUS
Status: DISCONTINUED | OUTPATIENT
Start: 2025-03-06 | End: 2025-03-09

## 2025-03-06 RX ORDER — FUROSEMIDE 10 MG/ML
40 INJECTION INTRAMUSCULAR; INTRAVENOUS 2 TIMES DAILY
Status: DISCONTINUED | OUTPATIENT
Start: 2025-03-06 | End: 2025-03-09

## 2025-03-06 RX ORDER — METHYLPREDNISOLONE SODIUM SUCCINATE 40 MG/ML
40 INJECTION INTRAMUSCULAR; INTRAVENOUS EVERY 12 HOURS
Status: COMPLETED | OUTPATIENT
Start: 2025-03-06 | End: 2025-03-07

## 2025-03-06 RX ORDER — ARFORMOTEROL TARTRATE 15 UG/2ML
15 SOLUTION RESPIRATORY (INHALATION)
Status: DISCONTINUED | OUTPATIENT
Start: 2025-03-06 | End: 2025-03-12 | Stop reason: HOSPADM

## 2025-03-06 RX ORDER — BUDESONIDE 0.5 MG/2ML
500 INHALANT ORAL
Status: DISCONTINUED | OUTPATIENT
Start: 2025-03-06 | End: 2025-03-12 | Stop reason: HOSPADM

## 2025-03-06 RX ADMIN — ARFORMOTEROL TARTRATE 15 MCG: 15 SOLUTION RESPIRATORY (INHALATION) at 12:00

## 2025-03-06 RX ADMIN — HEPARIN SODIUM 4000 UNITS: 1000 INJECTION INTRAVENOUS; SUBCUTANEOUS at 02:00

## 2025-03-06 RX ADMIN — ARFORMOTEROL TARTRATE 15 MCG: 15 SOLUTION RESPIRATORY (INHALATION) at 21:07

## 2025-03-06 RX ADMIN — ANORECTAL OINTMENT: 15.7; .44; 24; 20.6 OINTMENT TOPICAL at 08:11

## 2025-03-06 RX ADMIN — MICONAZOLE NITRATE: 20 POWDER TOPICAL at 22:35

## 2025-03-06 RX ADMIN — BUDESONIDE INHALATION 500 MCG: 0.5 SUSPENSION RESPIRATORY (INHALATION) at 12:00

## 2025-03-06 RX ADMIN — PETROLATUM: 420 OINTMENT TOPICAL at 08:11

## 2025-03-06 RX ADMIN — METOPROLOL TARTRATE 5 MG: 5 INJECTION INTRAVENOUS at 02:01

## 2025-03-06 RX ADMIN — METOPROLOL TARTRATE 5 MG: 5 INJECTION INTRAVENOUS at 08:46

## 2025-03-06 RX ADMIN — ANORECTAL OINTMENT: 15.7; .44; 24; 20.6 OINTMENT TOPICAL at 22:34

## 2025-03-06 RX ADMIN — FUROSEMIDE 20 MG: 10 INJECTION, SOLUTION INTRAMUSCULAR; INTRAVENOUS at 08:46

## 2025-03-06 RX ADMIN — DEXTROSE MONOHYDRATE: 50 INJECTION, SOLUTION INTRAVENOUS at 14:48

## 2025-03-06 RX ADMIN — METHYLPREDNISOLONE SODIUM SUCCINATE 40 MG: 40 INJECTION, POWDER, LYOPHILIZED, FOR SOLUTION INTRAMUSCULAR; INTRAVENOUS at 10:17

## 2025-03-06 RX ADMIN — HEPARIN SODIUM 7 UNITS/KG/HR: 10000 INJECTION, SOLUTION INTRAVENOUS at 02:00

## 2025-03-06 RX ADMIN — FUROSEMIDE 40 MG: 10 INJECTION, SOLUTION INTRAMUSCULAR; INTRAVENOUS at 17:22

## 2025-03-06 RX ADMIN — MICONAZOLE NITRATE: 20 POWDER TOPICAL at 08:11

## 2025-03-06 RX ADMIN — SODIUM CHLORIDE, PRESERVATIVE FREE 10 ML: 5 INJECTION INTRAVENOUS at 08:10

## 2025-03-06 RX ADMIN — SODIUM CHLORIDE, PRESERVATIVE FREE 10 ML: 5 INJECTION INTRAVENOUS at 22:25

## 2025-03-06 RX ADMIN — METOPROLOL TARTRATE 5 MG: 5 INJECTION INTRAVENOUS at 14:48

## 2025-03-06 RX ADMIN — METHYLPREDNISOLONE SODIUM SUCCINATE 40 MG: 40 INJECTION, POWDER, LYOPHILIZED, FOR SOLUTION INTRAMUSCULAR; INTRAVENOUS at 22:31

## 2025-03-06 RX ADMIN — BUDESONIDE INHALATION 500 MCG: 0.5 SUSPENSION RESPIRATORY (INHALATION) at 21:07

## 2025-03-06 RX ADMIN — PETROLATUM: 420 OINTMENT TOPICAL at 22:36

## 2025-03-06 NOTE — PROGRESS NOTES
Notified Dr. Madrid of patient's ABG results this AM. Orders received to increase patient's NIV rate to 22 and tidal volume to 500. This RN adjusted patient's NIV settings at this time and notified Dr. Madrid. Also notified respiratory therapist of change in NIV settings.     Orders also received to repeat ABGs in 1 hour.

## 2025-03-06 NOTE — CARE COORDINATION
Social Work/Discharge Planning:  Patient admitted from [facility] at [facility] LOC.  Patient went to facility on 3/3.  Called liaison Nancy with [facility] and confirmed patient will need a pre-cert to return.  Nancy states patient was not on a bipap at facility.  Met with patient and confirmed plan is to return to [facility] at discharge.  Called patient step-daughter Lucy (ph: [phone]) and confirmed plan for patient to return to [facility] for rehab at discharge.  Electronic N-17 in epic and transport form in soft chart.  Will continue to follow and assist with discharge planning.  Electronically signed by WINDY Moreno on 3/6/2025 at 2:05 PM

## 2025-03-06 NOTE — PROGRESS NOTES
Speech Language Pathology  NAME:  Aggie Carter  :  1945  DATE: 3/6/2025  ROOM:  [address] [place] [address]-A    Pt unavailable at 1600 for Clinical Swallow Evaluation Discussed with patient nurse in person     REASON:  Other: patient taken off BIPAP. Patient with nonproductive wet cough and inability to effectively clear secretions. Patient with inability to maintain oxygen >90% despite being placed on 6 liters by RN. Decision made to hold CSE at this time until respiratory status improves.     Will re-attempt as appropriate.       Thank You    Naina Mckeon M.S. CCC-SLP/L  Speech Language Pathologist  [phone]

## 2025-03-06 NOTE — PROGRESS NOTES
Patient's granddaughter Jossy on unit. Updated her on patient's status and plan of care. All questions and concerns addressed and answered at this time.

## 2025-03-06 NOTE — PROGRESS NOTES
[facility]   Progress note      Subjective:    Follow-up acute resp failure    This morning, ABG showing worsening resp acidosis but repeat 1-2 hrs later showing improving.    The patient is awake and alert.  More alert this morning.  Denies chest pain, angina, SOB     Objective:    BP (!) 115/91   Pulse 89   Temp 97.7 °F (36.5 °C) (Axillary)   Resp 22   Ht 1.651 m (5' 5\")   Wt 129.9 kg (286 lb 6 oz)   SpO2 97%   PF (!) 23 L/min   BMI 47.66 kg/m²     In: -   Out: 150   In: -   Out: 150 [Urine:150]    Physical Exam  Vitals reviewed.   Constitutional:       General: She is not in acute distress.     Appearance: Normal appearance. She is obese.   Eyes:      Extraocular Movements: Extraocular movements intact.      Pupils: Pupils are equal, round, and reactive to light.   Cardiovascular:      Rate and Rhythm: Normal rate. Rhythm irregular.      Pulses: Normal pulses.      Heart sounds: No murmur heard.  Pulmonary:      Effort: Pulmonary effort is normal. No respiratory distress.      Breath sounds: Rales (intermittent crackles RLL) present. No wheezing.      Comments: Decreased at both bases.  Improved BS from yesterday  Abdominal:      General: Abdomen is flat. Bowel sounds are normal.      Palpations: Abdomen is soft.      Tenderness: There is no abdominal tenderness.   Musculoskeletal:      Right lower leg: Edema (1+ pittting to mid shin) present.      Left lower leg: Edema (1+ pittting to mid shin) present.   Skin:     General: Skin is warm.      Findings: No rash.   Neurological:      General: No focal deficit present.      Mental Status: She is alert and oriented to person, place, and time. Mental status is at baseline.   Psychiatric:         Mood and Affect: Mood normal.         Behavior: Behavior normal.           Recent Labs     03/05/25  0503 03/06/25  0007 03/06/25  0448   WBC 5.9 4.7 4.9   HGB 13.4 13.4 13.2   HCT 48.8* 47.6 46.7       Recent Labs     03/05/25  0503 03/06/25  0448

## 2025-03-06 NOTE — PROGRESS NOTES
Notified Thomas Meyer-NP of concerns for patient aspirating with desaturation, coughing, and wet lung sounds post oral intake.

## 2025-03-06 NOTE — DISCHARGE INSTR - DIET
Good nutrition is important when healing from an illness, injury, or surgery.  Follow any nutrition recommendations given to you during your hospital stay.   If you were given an oral nutrition supplement while in the hospital, continue to take this supplement at home.  You can take it with meals, in-between meals, and/or before bedtime. These supplements can be purchased at most local grocery stores, pharmacies, and chain super-stores.   If you have any questions about your diet or nutrition, call the hospital and ask for the dietitian: Selam Ruiz RD, LD, [facility] at [phone]          Nutrition Guidelines for Heart Failure:  This nutrition therapy for heart failure focuses on limiting sodium in your diet to manage your heart failure symptoms and maintaining your body weight.   You may need to limit fluid in your diet. Your registered dietitian nutritionist (RDN) will provide you with the Fluid Restriction Nutrition Therapy handout if you need to limit your fluid intake.  Sodium  Salt (sodium) makes your body hold water. You may feel shortness of breath and experience swelling when your body is holding water. You can help to prevent these symptoms by eating less salt. You may need to limit the sodium you get from food and drinks to less than 2,300 milligrams per day.  Read the nutrition label to find out how much sodium is in 1 serving of a food. Select foods with 140 milligrams of sodium or less per serving.  Foods with more than 300 milligrams of sodium per serving may not fit into a reduced-sodium meal plan depending on your other food selections. Your RDN can help you determine which foods fit into your eating plan.    Check serving sizes. If you eat more than 1 serving, you will get more sodium than the amount listed.  Tips for Limiting Sodium in Your Diet  How to limit sodium Strategies   Avoid processed foods    Choose fresh and frozen fruits and vegetables without added juices or sauces. These foods

## 2025-03-06 NOTE — PROGRESS NOTES
Notified Thomas Meyer-CONCEPCION of SLP and this RN attempting swallow study. Notified her patient desaturated to 86% on 6L/NC and patient was placed back on AVAPs. Patient spo2 increased to 95% back on AVAPs. Orders received.

## 2025-03-06 NOTE — PROGRESS NOTES
Spiritual Health History and Assessment/Progress Note  [facility]     Encounter, Rituals, Rites and Sacraments,  ,  ,      Name: Aggie Carter MRN: [id number]    Age: 79 y.o.     Sex: female   Language: English   Presybeterian: Pentecostalism   Acute hypercapnic respiratory failure (HCC)     Date: 3/6/2025                           Spiritual Assessment began in [address] INTERMEDIATE 1        Referral/Consult From: Rounding   Encounter Overview/Reason:  Encounter, Rituals, Rites and Sacraments  Service Provided For: Patient    Arabella, Belief, Meaning:   Patient is connected with a arabella tradition or spiritual practice  Family/Friends No family/friends present      Importance and Influence:  Patient has no beliefs influential to healthcare decision-making identified during this visit  Family/Friends No family/friends present    Community:  Patient feels well-supported. Support system includes: Extended family  Family/Friends No family/friends present    Assessment and Plan of Care:     Patient Interventions include: Facilitated expression of thoughts and feelings, Affirmed coping skills/support systems, and Provided sacramental/Anabaptist ritual  Family/Friends Interventions include: No family/friends present    Patient Plan of Care: Spiritual Care available upon further referral  Family/Friends Plan of Care: Spiritual Care available upon further referral    Electronically signed by Chaplain Indigo on 3/6/2025 at 6:59 PM

## 2025-03-06 NOTE — CONSULTS
Nephrology Progress Note  Patient's Name: Aggie Carter  9:52 AM  3/6/2025    Nephrologist: SASHA Mckeon MD    Reason for Consult:  CKD  Requesting Physician: Dr. LUIS Hummel    Chief Complaint:  Fluid overload and Afib    History Obtained From:  patient and past medical records    History of Present Ilness from the 2/17/25 note:    Aggie Carter is a 79 y.o. female with prior history of CKD G3B with a baseline serum cr 1.6mg/dl with an e-GFR=32ml/min presumed sec to microvascular disease from HTN, dyslipidemia. Pt was admitted on 2/19/25 for vol overload and Afib. She had an episode of Afib with RVR and she told ED MD she had a 21# wt gain. She told me her MD in [place] had once told her diuretics caused kidney problems and she cut back on the dose, but then her legs began to swell. She noted she had AUSTIN.    Full <Consult deferred as pt just seen on 3/3/25: Addendum to the above: Pt was D/[facility] on 3/3/25 to the [facility] and [facility] 3/5/25 with the cc SOB. She had been D/Naman on 3L O by NC and at the F she was on 5L O2 by NC and had an O2sat 80% per the ED note.  In the ED the PCO2 113 and princess to a pCO2 130. She is on AVAPS this AM and last pCO2 97    INTERVAL HX:    3/6/25: Pt on BIPAP did not arouse for me    Past Medical History:   Diagnosis Date    Atrial fibrillation (HCC)     Dyspnea on exertion     Family history of heart disease     Hyperlipidemia     Hypertension     Mild aortic stenosis 05/10/2013       Past Surgical History:   Procedure Laterality Date    TUBAL LIGATION         Family History   Problem Relation Age of Onset    Heart Attack Brother         reports that she quit smoking about 60 years ago. Her smoking use included cigarettes. She started smoking about 61 years ago. She has a 0.3 pack-year smoking history. She has never used smokeless tobacco. She reports that she does not drink alcohol.    Allergies:  Patient has no known allergies.    Current Medications:    arformoterol tartrate  visualized.  Aorta Normal sized aortic root.  Pericardium No pericardial effusion.    XR CHEST PORTABLE [[id number]] Collected: 03/05/25 0539     Order Status: Completed Updated: 03/05/25 0544    Narrative:      EXAMINATION:  ONE XRAY VIEW OF THE CHEST    3/5/2025 5:20 am    COMPARISON:  02/27/2025    HISTORY:  ORDERING SYSTEM PROVIDED HISTORY: Shortness of Breath  TECHNOLOGIST PROVIDED HISTORY:  Reason for exam:->Shortness of Breath    FINDINGS:  There is cardiomegaly.  There are bilateral interstitial and airspace  opacities.  No pneumothorax or pleural effusion.    Impression:      Cardiomegaly with bilateral interstitial and airspace opacities. Findings  could be due to pulmonary edema or multifocal pneumonia.        Assessment  1-CKD G3B with a baseline serum cr 1.6mg/dl with an e-GFR=32ml/min presumed sec to microvascular disease from HTN, dyslipidemia  UA (-) blood/protein/Ni/LE  Urine protein : cr 0.11 not elevated  Cr overall minimal change Since D/C  PLAN:  Follow Labs  Follow I/O's    2-Edema of the Bilat LE  2/28/25 US of the Bilat LE DVT (-)  UO 2250ml  PLAN:  Continue  furosemide change to  the  40mg  IV bid  Follow wt's    3-Hypokalemia -resolved  PLAN:  Follow K+ and Mg++ with the increase in the furosemide    4-Acute on Chronic Hypercarbic Resp Failure with an Acute on Chronic Resp Acidosis with a compensatory Metabolic Alkalosis   PLAN:  Follow HCO3 and the ABG's    5- HTN with CKD I-IV  BP goal <130/80(ACC/AHA Target)-BP at/near goal  PLAN:  Follow BP    Thank you  for allowing us to participate in care of Aggie Mckeon MD  9:52 AM  3/6/2025

## 2025-03-06 NOTE — DISCHARGE INSTR - COC
Continuity of Care Form    Patient Name: Aggie Carter   :  1945  MRN:  [id number]    Admit date:  3/5/2025  Discharge date:  3/12/2025    Code Status Order: Full Code   Advance Directives:   Advance Care Flowsheet Documentation             Admitting Physician:  Dwight Avendano MD  PCP: Fuentes Herrera MD    Discharging Nurse: danilo trinh rn  Discharging Hospital Unit/Room#: 0436/0436-A  Discharging Unit Phone Number: [phone]    Emergency Contact:   Extended Emergency Contact Information  Primary Emergency Contact: Lucy Chow  Home Phone: [phone]  Mobile Phone: [phone]  Relation: Step Child   needed? No  Secondary Emergency Contact: Jossy Sewell  Mobile Phone: [phone]  Relation: Grandchild    Past Surgical History:  Past Surgical History:   Procedure Laterality Date    TUBAL LIGATION         Immunization History:     There is no immunization history on file for this patient.    Active Problems:  Patient Active Problem List   Diagnosis Code    Exertional dyspnea R06.09    Leg edema R60.0    DM (diabetes mellitus) (HCC) E11.9    HTN (hypertension) I10    HLD (hyperlipidemia) E78.5    Hypothyroidism E03.9    Nonrheumatic aortic valve stenosis I35.0    New onset atrial fibrillation ([facility]) I48.91    Benign hypertensive kidney disease I12.9    Bilateral nonexudative age-related macular degeneration H35.3130    Body mass index (BMI) 40.0-44.9, adult Z68.41    Herpes zoster without complication B02.9    Ptosis of eyelid H02.409    Pure hypercholesterolemia E78.00    Seborrheic dermatitis of scalp L21.9    Stage 3b chronic kidney disease (HCC) N18.32    Acute heart failure, unspecified heart failure type ([facility]) I50.9    Acute hypercapnic respiratory failure ([facility]) J96.02    RSV (acute bronchiolitis due to respiratory syncytial virus) J21.0       Isolation/Infection:   Isolation            Contact  Droplet          Patient Infection Status       Infection Onset Added Last  Liquids  Daily Fluid Restriction: yes - amount 1800  Last Modified Barium Swallow with Video (Video Swallowing Test): not done    Treatments at the Time of Hospital Discharge:   Respiratory Treatments: BROVANA, PULMICORT & DUONEB BREATHING TX'S  Oxygen Therapy:  is on oxygen at 4 L/min per nasal cannula.  Ventilator:   - BiPAP       Rehab Therapies: Physical Therapy and Occupational Therapy  Weight Bearing Status/Restrictions: No weight bearing restrictions  Other Medical Equipment (for information only, NOT a DME order):  walker  Other Treatments:     Patient's personal belongings (please select all that are sent with patient):      RN SIGNATURE:  Electronically signed by Nehal Chang RN on 3/12/25 at 12:23 PM EDT    CASE MANAGEMENT/SOCIAL WORK SECTION    Inpatient Status Date: 3/5/2025    Readmission Risk Assessment Score:  [facility] RISK OF UNPLANNED READMISSION 2.0             17.2 Total Score        Discharging to Facility/ Agency   Name: [facility]   Address: [address] [place] [address]   Phone: [phone]  Fax: [phone]    Dialysis Facility (if applicable)   Name:  Address:  Dialysis Schedule:  Phone:  Fax:    / signature: Electronically signed by WINDY Moreno on 3/6/25 at 1:59 PM EST    PHYSICIAN SECTION    Prognosis: Good    Condition at Discharge: Stable    Rehab Potential (if transferring to Rehab): Good    Recommended Labs or Other Treatments After Discharge:     Physician Certification: I certify the above information and transfer of Aggie Carter  is necessary for the continuing treatment of the diagnosis listed and that she requires Skilled Nursing Facility for less 30 days.     Update Admission H&P: No change in H&P    PHYSICIAN SIGNATURE:  Electronically signed by SARA Gaines CNP on 3/12/25 at 12:17 PM EDT

## 2025-03-06 NOTE — CONSULTS
Nutrition Education    Educated on CHF Diet Guidelines (Low Na)  Learners: Patient  Method: Handout (In Discharge Instructions)  Contact name and number provided.    SUMMARY: Consult for CHF Diet teaching. Pt from [facility]3 D[facility]C from [facility] for [facility]F. Currently NPO. Will provide written diet copy in Discharge instructions to assure facility has info and as pt is in Droplet Isolation currently for RSV+. Will continue inpatient monitoring and recommend when medically feasible, diet advanced to Low Na diet.      Selam Ruiz RD, CNSC, LD  Contact Number: x [phone]

## 2025-03-06 NOTE — ACP (ADVANCE CARE PLANNING)
Advance Care Planning   Healthcare Decision Maker:    Primary Decision Maker: Lucy Chow - Step Child - [phone]    Secondary Decision Maker: DonatoJossy - Grandchild - [phone]    Click here to complete Healthcare Decision Makers including selection of the Healthcare Decision Maker Relationship (ie \"Primary\").

## 2025-03-06 NOTE — PROGRESS NOTES
Date: 3/5/2025    Time: 10:49 PM    Patient Placed On BIPAP/CPAP/ Non-Invasive Ventilation?  Yes    If no must comment.  Facial area red/color change? Yes           If YES are Blister/Lesion present?No   If yes must notify nursing staff  BIPAP/CPAP skin barrier?  Yes    Skin barrier type:mepilexlite       Comments: slight redness on bridge of nose        Patricia Simerlink, RCP

## 2025-03-07 ENCOUNTER — APPOINTMENT (OUTPATIENT)
Dept: GENERAL RADIOLOGY | Age: 80
DRG: 291 | End: 2025-03-07
Payer: MEDICARE

## 2025-03-07 LAB
ANION GAP SERPL CALCULATED.3IONS-SCNC: 6 MMOL/L (ref 7–16)
ATYPICAL LYMPHOCYTE ABSOLUTE COUNT: 0.12 K/UL (ref 0–0.46)
ATYPICAL LYMPHOCYTES: 2 % (ref 0–4)
B.E.: 19.6 MMOL/L (ref -3–3)
B.E.: 20.4 MMOL/L (ref -3–3)
BASOPHILS # BLD: 0 K/UL (ref 0–0.2)
BASOPHILS NFR BLD: 0 % (ref 0–2)
BNP SERPL-MCNC: 3474 PG/ML (ref 0–450)
BUN SERPL-MCNC: 56 MG/DL (ref 6–23)
CALCIUM SERPL-MCNC: 9.2 MG/DL (ref 8.6–10.2)
CHLORIDE SERPL-SCNC: 95 MMOL/L (ref 98–107)
CO2 SERPL-SCNC: 46 MMOL/L (ref 22–29)
COHB: 1 % (ref 0–1.5)
COHB: 1.3 % (ref 0–1.5)
CREAT SERPL-MCNC: 1.3 MG/DL (ref 0.5–1)
CRITICAL: ABNORMAL
CRITICAL: ABNORMAL
DATE ANALYZED: ABNORMAL
DATE ANALYZED: ABNORMAL
DATE OF COLLECTION: ABNORMAL
DATE OF COLLECTION: ABNORMAL
EOSINOPHIL # BLD: 0 K/UL (ref 0.05–0.5)
EOSINOPHILS RELATIVE PERCENT: 0 % (ref 0–6)
ERYTHROCYTE [DISTWIDTH] IN BLOOD BY AUTOMATED COUNT: 16.3 % (ref 11.5–15)
FIO2: 50 %
FIO2: 50 %
GFR, ESTIMATED: 42 ML/MIN/1.73M2
GLUCOSE SERPL-MCNC: 162 MG/DL (ref 74–99)
HCO3: 47.3 MMOL/L (ref 22–26)
HCO3: 48.6 MMOL/L (ref 22–26)
HCT VFR BLD AUTO: 44.9 % (ref 34–48)
HGB BLD-MCNC: 12.4 G/DL (ref 11.5–15.5)
HHB: 2 % (ref 0–5)
HHB: 4.5 % (ref 0–5)
LAB: ABNORMAL
LAB: ABNORMAL
LYMPHOCYTES NFR BLD: 0 K/UL (ref 1.5–4)
LYMPHOCYTES RELATIVE PERCENT: 0 % (ref 20–42)
Lab: 1110
Lab: 910
MAGNESIUM SERPL-MCNC: 2 MG/DL (ref 1.6–2.6)
MCH RBC QN AUTO: 26.3 PG (ref 26–35)
MCHC RBC AUTO-ENTMCNC: 27.6 G/DL (ref 32–34.5)
MCV RBC AUTO: 95.1 FL (ref 80–99.9)
METHB: 0.3 % (ref 0–1.5)
METHB: 0.3 % (ref 0–1.5)
MODE: ABNORMAL
MODE: ABNORMAL
MONOCYTES NFR BLD: 0.23 K/UL (ref 0.1–0.95)
MONOCYTES NFR BLD: 4 % (ref 2–12)
NEUTROPHILS NFR BLD: 95 % (ref 43–80)
NEUTS SEG NFR BLD: 6.45 K/UL (ref 1.8–7.3)
O2 CONTENT: 18.9 ML/DL
O2 CONTENT: 18.9 ML/DL
O2 SATURATION: 95.4 % (ref 92–98.5)
O2 SATURATION: 98 % (ref 92–98.5)
O2HB: 93.9 % (ref 94–97)
O2HB: 96.7 % (ref 94–97)
OPERATOR ID: 1023
OPERATOR ID: 46
PARTIAL THROMBOPLASTIN TIME: 45.3 SEC (ref 24.5–35.1)
PARTIAL THROMBOPLASTIN TIME: 60.8 SEC (ref 24.5–35.1)
PARTIAL THROMBOPLASTIN TIME: 80.6 SEC (ref 24.5–35.1)
PATIENT TEMP: 37 C
PATIENT TEMP: 37 C
PCO2: 67.1 MMHG (ref 35–45)
PCO2: 70.1 MMHG (ref 35–45)
PEEP/CPAP: 8 CMH2O
PEEP/CPAP: 8 CMH2O
PFO2: 1.47 MMHG/%
PFO2: 1.94 MMHG/%
PH BLOOD GAS: 7.46 (ref 7.35–7.45)
PH BLOOD GAS: 7.47 (ref 7.35–7.45)
PLATELET # BLD AUTO: 130 K/UL (ref 130–450)
PMV BLD AUTO: 12.1 FL (ref 7–12)
PO2: 73.6 MMHG (ref 75–100)
PO2: 96.8 MMHG (ref 75–100)
POTASSIUM SERPL-SCNC: 4.2 MMOL/L (ref 3.5–5)
RBC # BLD AUTO: 4.72 M/UL (ref 3.5–5.5)
RBC # BLD: ABNORMAL 10*6/UL
RI(T): 1.9
RI(T): 2.77
RR MECHANICAL: 22 B/MIN
RR MECHANICAL: 22 B/MIN
S PNEUM AG SPEC QL: NEGATIVE
SODIUM SERPL-SCNC: 147 MMOL/L (ref 132–146)
SOURCE, BLOOD GAS: ABNORMAL
SOURCE, BLOOD GAS: ABNORMAL
SPECIMEN SOURCE: NORMAL
THB: 13.8 G/DL (ref 11.5–16.5)
THB: 14.3 G/DL (ref 11.5–16.5)
TIME ANALYZED: 1116
TIME ANALYZED: 917
VT MECHANICAL: 500 ML
VT MECHANICAL: 500 ML
WBC OTHER # BLD: 6.8 K/UL (ref 4.5–11.5)

## 2025-03-07 PROCEDURE — 82805 BLOOD GASES W/O2 SATURATION: CPT

## 2025-03-07 PROCEDURE — 83735 ASSAY OF MAGNESIUM: CPT

## 2025-03-07 PROCEDURE — 2580000003 HC RX 258: Performed by: INTERNAL MEDICINE

## 2025-03-07 PROCEDURE — 6360000002 HC RX W HCPCS: Performed by: NURSE PRACTITIONER

## 2025-03-07 PROCEDURE — 92611 MOTION FLUOROSCOPY/SWALLOW: CPT

## 2025-03-07 PROCEDURE — 94660 CPAP INITIATION&MGMT: CPT

## 2025-03-07 PROCEDURE — 94640 AIRWAY INHALATION TREATMENT: CPT

## 2025-03-07 PROCEDURE — 2500000003 HC RX 250 WO HCPCS: Performed by: NURSE PRACTITIONER

## 2025-03-07 PROCEDURE — 80048 BASIC METABOLIC PNL TOTAL CA: CPT

## 2025-03-07 PROCEDURE — 92526 ORAL FUNCTION THERAPY: CPT

## 2025-03-07 PROCEDURE — 6360000002 HC RX W HCPCS: Performed by: INTERNAL MEDICINE

## 2025-03-07 PROCEDURE — 2060000000 HC ICU INTERMEDIATE R&B

## 2025-03-07 PROCEDURE — 85025 COMPLETE CBC W/AUTO DIFF WBC: CPT

## 2025-03-07 PROCEDURE — 2500000003 HC RX 250 WO HCPCS: Performed by: RADIOLOGY

## 2025-03-07 PROCEDURE — 92610 EVALUATE SWALLOWING FUNCTION: CPT

## 2025-03-07 PROCEDURE — 2700000000 HC OXYGEN THERAPY PER DAY

## 2025-03-07 PROCEDURE — 85730 THROMBOPLASTIN TIME PARTIAL: CPT

## 2025-03-07 PROCEDURE — 83880 ASSAY OF NATRIURETIC PEPTIDE: CPT

## 2025-03-07 PROCEDURE — 74230 X-RAY XM SWLNG FUNCJ C+: CPT

## 2025-03-07 RX ORDER — PREDNISONE 20 MG/1
20 TABLET ORAL DAILY
Status: DISCONTINUED | OUTPATIENT
Start: 2025-03-08 | End: 2025-03-10

## 2025-03-07 RX ADMIN — ARFORMOTEROL TARTRATE 15 MCG: 15 SOLUTION RESPIRATORY (INHALATION) at 19:32

## 2025-03-07 RX ADMIN — METOPROLOL TARTRATE 5 MG: 5 INJECTION INTRAVENOUS at 16:12

## 2025-03-07 RX ADMIN — ANORECTAL OINTMENT: 15.7; .44; 24; 20.6 OINTMENT TOPICAL at 07:46

## 2025-03-07 RX ADMIN — HEPARIN SODIUM 9 UNITS/KG/HR: 10000 INJECTION, SOLUTION INTRAVENOUS at 06:18

## 2025-03-07 RX ADMIN — HEPARIN SODIUM 2000 UNITS: 1000 INJECTION INTRAVENOUS; SUBCUTANEOUS at 06:16

## 2025-03-07 RX ADMIN — BUDESONIDE INHALATION 500 MCG: 0.5 SUSPENSION RESPIRATORY (INHALATION) at 09:12

## 2025-03-07 RX ADMIN — METHYLPREDNISOLONE SODIUM SUCCINATE 40 MG: 40 INJECTION, POWDER, LYOPHILIZED, FOR SOLUTION INTRAMUSCULAR; INTRAVENOUS at 22:29

## 2025-03-07 RX ADMIN — MICONAZOLE NITRATE: 20 POWDER TOPICAL at 19:57

## 2025-03-07 RX ADMIN — ARFORMOTEROL TARTRATE 15 MCG: 15 SOLUTION RESPIRATORY (INHALATION) at 09:12

## 2025-03-07 RX ADMIN — PETROLATUM: 420 OINTMENT TOPICAL at 19:57

## 2025-03-07 RX ADMIN — BARIUM SULFATE 70 G: 0.81 POWDER, FOR SUSPENSION ORAL at 15:12

## 2025-03-07 RX ADMIN — BARIUM SULFATE 10 ML: 400 PASTE ORAL at 15:12

## 2025-03-07 RX ADMIN — PETROLATUM: 420 OINTMENT TOPICAL at 07:46

## 2025-03-07 RX ADMIN — DEXTROSE MONOHYDRATE: 50 INJECTION, SOLUTION INTRAVENOUS at 10:17

## 2025-03-07 RX ADMIN — METOPROLOL TARTRATE 5 MG: 5 INJECTION INTRAVENOUS at 19:56

## 2025-03-07 RX ADMIN — BARIUM SULFATE 120 ML: 400 SUSPENSION ORAL at 15:13

## 2025-03-07 RX ADMIN — FUROSEMIDE 40 MG: 10 INJECTION, SOLUTION INTRAMUSCULAR; INTRAVENOUS at 07:45

## 2025-03-07 RX ADMIN — MICONAZOLE NITRATE: 20 POWDER TOPICAL at 07:47

## 2025-03-07 RX ADMIN — BUDESONIDE INHALATION 500 MCG: 0.5 SUSPENSION RESPIRATORY (INHALATION) at 19:32

## 2025-03-07 RX ADMIN — ANORECTAL OINTMENT: 15.7; .44; 24; 20.6 OINTMENT TOPICAL at 19:57

## 2025-03-07 RX ADMIN — FUROSEMIDE 40 MG: 10 INJECTION, SOLUTION INTRAMUSCULAR; INTRAVENOUS at 18:30

## 2025-03-07 RX ADMIN — SODIUM CHLORIDE, PRESERVATIVE FREE 10 ML: 5 INJECTION INTRAVENOUS at 07:46

## 2025-03-07 RX ADMIN — METOPROLOL TARTRATE 5 MG: 5 INJECTION INTRAVENOUS at 07:45

## 2025-03-07 RX ADMIN — METHYLPREDNISOLONE SODIUM SUCCINATE 40 MG: 40 INJECTION, POWDER, LYOPHILIZED, FOR SOLUTION INTRAMUSCULAR; INTRAVENOUS at 10:13

## 2025-03-07 RX ADMIN — METOPROLOL TARTRATE 5 MG: 5 INJECTION INTRAVENOUS at 03:18

## 2025-03-07 ASSESSMENT — PAIN SCALES - GENERAL
PAINLEVEL_OUTOF10: 0

## 2025-03-07 NOTE — PROGRESS NOTES
[facility]   Progress note      Subjective:    F/u acute on chronic resp failure    The patient is sleepy but awakens to touch  On Bipap this morning.  Had break from bipap yesterday morning, but ABG in afternoon, showing worsening hypercapnia.  Improved overnight - pCO2 67 this morning.    Objective:    BP (!) 136/102   Pulse 89   Temp 97.9 °F (36.6 °C) (Axillary)   Resp 23   Ht 1.651 m (5' 5\")   Wt (!) 138.2 kg (304 lb 10.8 oz)   SpO2 100%   PF (!) 23 L/min   BMI 50.70 kg/m²     In: 0   Out: 1400   In: 0   Out: 1400 [Urine:1400]    Physical Exam  Constitutional:       General: She is not in acute distress.     Appearance: Normal appearance. She is obese.   Cardiovascular:      Rate and Rhythm: Normal rate. Rhythm irregular.      Pulses: Normal pulses.      Heart sounds: No murmur heard.  Pulmonary:      Effort: Pulmonary effort is normal. No respiratory distress.      Breath sounds: No wheezing.      Comments: Decreased BS both bases  Abdominal:      General: Abdomen is flat. Bowel sounds are normal.      Palpations: Abdomen is soft.      Tenderness: There is no abdominal tenderness.   Musculoskeletal:      Right lower leg: Edema (trace pitting edema) present.      Left lower leg: Edema (trace pitting edema) present.   Neurological:      General: No focal deficit present.      Mental Status: She is alert. Mental status is at baseline.   Psychiatric:         Mood and Affect: Mood normal.         Behavior: Behavior normal.            Recent Labs     03/06/25  0007 03/06/25  0448 03/07/25  0500   WBC 4.7 4.9 6.8   HGB 13.4 13.2 12.4   HCT 47.6 46.7 44.9   PLT  --   --  130       Recent Labs     03/05/25  0503 03/06/25  0448 03/07/25  0500    149* 147*   K 4.0 3.9 4.2   CL 91* 96* 95*   CO2 48* 48* 46*   BUN 55* 52* 56*   CREATININE 1.4* 1.3* 1.3*   CALCIUM 9.6 9.4 9.2       Assessment:    Principal Problem:    Acute hypercapnic respiratory failure (HCC)  Active Problems:    HTN  contraction alkaosis on diuresis. Renal function and potassium stable. Sodium 147, from 149.  Cont LABA and ICS nebs. Duonebs.  IV steroids, then PO prednisone.  Try to cycle patient on/off bipap.         Code Status:  full  Consultants:  pulm and nephrology  DVT Prophylaxis - heparin drip  PT/OT -unable to do on bipap  Discharge planning - back to [facility]         Dwight Avendano MD  11:31 AM  3/7/2025

## 2025-03-07 NOTE — PROGRESS NOTES
Rafa Ramires M.D.,[facility]  Sher Corbett D.O., F.PATOToiI., [facility]  Jessi Griffin M.D.  Ellen Madrid M.D.   Jose Luis Caballero D.O.  Petey Thibodeaux M.D.         Daily Pulmonary Progress Note    Patient:  Aggie Carter 79 y.o. female MRN: [id number]            Synopsis     We are following patient for recurrent resp acidosis on bipap, titrate bipap for snf    \"CC\" AMS, SOB    Code status: FULL        Subjective      Patient was seen and examined. Episodes of desaturation on nasal cannula oxygen. Requiring continuous NIV AVAPS for respiratory support over the past 24 hrs since yesterday afternoon 4 pm.  Recently taken off NIV and placed on O2 6 L nasal cannula.  Much more alert, improved mentation.  Repeat ABG with improved hypercapnia and respiratory acidosis.  Speech to perform bedside swallow.  Currently n.p.o. status.  Recommend video swallow study when respiratory status improves.    Review of Systems:  Constitutional: Denies fever, weight loss, night sweats, and fatigue  Skin: Denies pigmentation, dark lesions, and rashes   HEENT: Denies hearing loss, tinnitus, ear drainage, epistaxis, sore throat, and hoarseness.  Cardiovascular: Denies palpitations, chest pain, and chest pressure.  Respiratory: moist cough, upper airway wheeze, hypoxia  Gastrointestinal: Denies nausea, vomiting, poor appetite, diarrhea, heartburn or reflux  Genitourinary: Denies dysuria, frequency, urgency or hematuria  Musculoskeletal: Denies myalgias, muscle weakness, and bone pain intermittent confusion  Neurological: Denies dizziness, vertigo, headache, and focal weakness  Psychological: Denies anxiety and depression  Endocrine: Denies heat intolerance and cold intolerance  Hematopoietic/Lymphatic: Denies bleeding problems and blood transfusions    24-hour events:  None     Objective   OBJECTIVE:   BP (!) 136/102   Pulse 89   Temp 97.9 °F (36.6 °C) (Axillary)   Resp 23   Ht 1.651 m (5' 5\")   Wt (!) 138.2 kg (304 lb 10.8 oz)  size is normal. Moderate concentric hypertrophy. Indeterminate diastolic function.    Right Ventricle: Right ventricle is dilated. Low normal systolic function. TAPSE is 1.7 cm.    Aortic Valve: Fibrocalcific changes present. No regurgitation. Moderate stenosis. AV mean gradient is 14 mmHg. AV peak velocity is 2.3 m/s. AV Velocity Ratio is 0.30. LVOT:AV VTI Index is 0.32. LVOT diameter is 2.2 cm. AV area by continuity VTI is 1.2 cm2. AV area by peak velocity is 1.2 cm2.    Tricuspid Valve: Mild regurgitation. The estimated RVSP is 49 mmHg.    Contrast used: Lumason.         Labs:  Lab Results   Component Value Date/Time    WBC 6.8 03/07/2025 05:00 AM    RBC 4.72 03/07/2025 05:00 AM    HGB 12.4 03/07/2025 05:00 AM    HCT 44.9 03/07/2025 05:00 AM    MCV 95.1 03/07/2025 05:00 AM    MCH 26.3 03/07/2025 05:00 AM    MCHC 27.6 03/07/2025 05:00 AM    RDW 16.3 03/07/2025 05:00 AM     03/07/2025 05:00 AM    MPV 12.1 03/07/2025 05:00 AM     Lab Results   Component Value Date/Time     03/07/2025 05:00 AM    K 4.2 03/07/2025 05:00 AM    K 4.1 04/14/2019 11:02 AM    CL 95 03/07/2025 05:00 AM    CO2 46 03/07/2025 05:00 AM    BUN 56 03/07/2025 05:00 AM    CREATININE 1.3 03/07/2025 05:00 AM    CALCIUM 9.2 03/07/2025 05:00 AM    GFRAA >60 04/14/2019 11:02 AM    LABGLOM 42 03/07/2025 05:00 AM     Lab Results   Component Value Date/Time    PROTIME 11.6 04/14/2019 11:02 AM    INR 1.0 04/14/2019 11:02 AM     Recent Labs     03/07/25  0500   PROBNP 3,474*     No results for input(s): \"TROPONINI\" in the last 72 hours.  Recent Labs     03/05/25  0503   PROCAL 0.16*     This SmartLink has not been configured with any valid records.      Latest Reference Range & Units 03/07/25 09:10   pH, Blood Gas 7.350 - 7.450  7.459 (H)   PCO2 35.0 - 45.0 mmHg 70.1 (HH)   pO2 75.0 - 100.0 mmHg 73.6 (L)   HCO3 22.0 - 26.0 mmol/L 48.6 (H)   Base Excess -3.0 - 3.0 mmol/L 20.4 (H)   O2 Sat 92.0 - 98.5 % 95.4   (HH): Data is critically high  (H):

## 2025-03-07 NOTE — PLAN OF CARE
Problem: Chronic Conditions and Co-morbidities  Goal: Patient's chronic conditions and co-morbidity symptoms are monitored and maintained or improved  3/7/2025 0927 by Josue Becker RN  Outcome: Progressing     Problem: Discharge Planning  Goal: Discharge to home or other facility with appropriate resources  3/7/2025 0927 by Josue Becker RN  Outcome: Progressing     Problem: Pain  Goal: Verbalizes/displays adequate comfort level or baseline comfort level  3/7/2025 0927 by Josue Becker RN  Outcome: Progressing     Problem: Safety - Adult  Goal: Free from fall injury  3/7/2025 0927 by Josue Becker RN  Outcome: Progressing     Problem: ABCDS Injury Assessment  Goal: Absence of physical injury  3/7/2025 0927 by Josue Becker RN  Outcome: Progressing     Problem: Skin/Tissue Integrity  Goal: Skin integrity remains intact  Description: 1.  Monitor for areas of redness and/or skin breakdown  2.  Assess vascular access sites hourly  3.  Every 4-6 hours minimum:  Change oxygen saturation probe site  4.  Every 4-6 hours:  If on nasal continuous positive airway pressure, respiratory therapy assess nares and determine need for appliance change or resting period  3/7/2025 0927 by Josue Becker RN  Outcome: Progressing

## 2025-03-07 NOTE — PROGRESS NOTES
SPEECH/LANGUAGE PATHOLOGY  CLINICAL ASSESSMENT OF SWALLOWING FUNCTION   and PLAN OF CARE      PATIENT NAME:  Aggie Carter  (female)     MRN:  [id number]    :  1945  (79 y.o.)  STATUS:  Inpatient: Room 0436/0436-A    TODAY'S DATE:  3/7/2025  ORDER DATE, DESCRIPTION AND REFERRING PROVIDER: CLAUDIA Saldaña  REASON FOR REFERRAL: Assess swallow function    EVALUATING THERAPIST: Naina Mckeon SLP                 RESULTS:    DYSPHAGIA DIAGNOSIS:   Due to current respiratory symptoms a cough/throat clear post swallow is an unreliable predictor of impaired airway protection.   If aspiration is suspected based on medical presentation need MBSS order.      DIET RECOMMENDATIONS:  NPO until MBSS can be completed        FEEDING RECOMMENDATIONS:     Assistance level:  Not applicable      Compensatory strategies recommended: Fully alert for all PO, Thorough oral care to prevent colonization of oral bacteria, Upright in bed/ chair as tolerated      Discussed recommendations with:  patient nurse in person    SPEECH THERAPY  PLAN OF CARE   The dysphagia POC is established based on physician order, dysphagia diagnosis and results of clinical assessment     Will make further recommendations/changes to POC once MBSS is completed.    Conditions Requiring Skilled Therapeutic Intervention for dysphagia:    Patient is performing below functional baseline d/t  current acute condition, respiratory compromise, multiple medications, and/or increased dependency upon caregivers.  Coughing during PO intake      Specific dysphagia interventions to include:     MBSS to fully assess oropharyngeal swallow function and to assist in determining the least restrictive PO diet to maintain adequate nutrition/hydration     Specific instructions for next treatment:  MBSS to be completed  Patient Treatment Goals:    Short Term Goals:  Pt will participate in MBSS to fully assess oropharyngeal swallow function and to assist in determining the

## 2025-03-07 NOTE — PROGRESS NOTES
Occupational Therapy  Date:3/7/2025  Patient Name: Aggie Carter  Room: [facility]6/[facility]-A     Occupational Therapy (OT) order received, patient's medical record reviewed, and OT evaluation attempted this morning; OT evaluation held, per nursing, due to respiratory status. OT evaluation to be re-attempted at later date, as able/appropriate.    Ann Gómez, OTR/L  License Number: [id number]

## 2025-03-07 NOTE — PROGRESS NOTES
Spoke to Jossy, patient's granddaughter, and updated on the plan of care and patient's condition.

## 2025-03-07 NOTE — PROGRESS NOTES
Nephrology Progress Note  Patient's Name: Aggie Carter  2:50 PM  3/7/2025    Nephrologist: SASHA Mckeon MD    Reason for Consult:  CKD  Requesting Physician: Dr. LUIS Hummel    Chief Complaint:  Fluid overload and Afib    History Obtained From:  patient and past medical records    History of Present Ilness from the 2/17/25 note:    Aggie Carter is a 79 y.o. female with prior history of CKD G3B with a baseline serum cr 1.6mg/dl with an e-GFR=32ml/min presumed sec to microvascular disease from HTN, dyslipidemia. Pt was admitted on 2/19/25 for vol overload and Afib. She had an episode of Afib with RVR and she told ED MD she had a 21# wt gain. She told me her MD in [place] had once told her diuretics caused kidney problems and she cut back on the dose, but then her legs began to swell. She noted she had AUSTIN.    Full <Consult deferred as pt just seen on 3/3/25: Addendum to the above: Pt was D/[facility] on 3/3/25 to the [facility] and returned to the [facility] ED 3/5/25 with the cc SOB. She had been D/Naman on 3L O by NC and at the [facility] she was on 5L O2 by NC and had an O2sat 80% per the ED note.  In the ED the PCO2 113 and princess to a pCO2 130. She is on AVAPS this AM and last pCO2 97    INTERVAL HX:    3/7/25: Pt awake alert up in bed on NC at the time of my visit. She states she does feel SOB, but better than when she came in. She states she does not have her teeth and eating id difficult    Past Medical History:   Diagnosis Date    Atrial fibrillation (HCC)     Dyspnea on exertion     Family history of heart disease     Hyperlipidemia     Hypertension     Mild aortic stenosis 05/10/2013       Past Surgical History:   Procedure Laterality Date    TUBAL LIGATION         Family History   Problem Relation Age of Onset    Heart Attack Brother         reports that she quit smoking about 60 years ago. Her smoking use included cigarettes. She started smoking about 61 years ago. She has a 0.3 pack-year smoking history. She has never used smokeless  continuity VTI is 1.2 cm2. AV area by peak velocity is 1.2 cm2.  Mitral Valve Trace regurgitation. No stenosis noted.  Tricuspid Valve Mild regurgitation. The estimated RVSP is 49 mmHg.  Pulmonic Valve The pulmonic valve was not well visualized.  Aorta Normal sized aortic root.  Pericardium No pericardial effusion.    XR CHEST PORTABLE [[id number]] Collected: 03/05/25 0539     Order Status: Completed Updated: 03/05/25 0544    Narrative:      EXAMINATION:  ONE XRAY VIEW OF THE CHEST    3/5/2025 5:20 am    COMPARISON:  02/27/2025    HISTORY:  ORDERING SYSTEM PROVIDED HISTORY: Shortness of Breath  TECHNOLOGIST PROVIDED HISTORY:  Reason for exam:->Shortness of Breath    FINDINGS:  There is cardiomegaly.  There are bilateral interstitial and airspace  opacities.  No pneumothorax or pleural effusion.    Impression:      Cardiomegaly with bilateral interstitial and airspace opacities. Findings  could be due to pulmonary edema or multifocal pneumonia.        Assessment  1-CKD G3B with a baseline serum cr 1.6mg/dl with an e-GFR=32ml/min presumed sec to microvascular disease from HTN, dyslipidemia  UA (-) blood/protein/Ni/LE  Urine protein : cr 0.11 not elevated  Cr overall minimal change Since D/C  PLAN:  Follow Labs  Follow I/O's    2-Edema of the Bilat LE  2/28/25 US of the Bilat LE DVT (-)  UO 850ml yesterday  PLAN:  Continue  furosemide change   40mg  IV bid  Follow wt's    3-Hypokalemia -resolved  PLAN:  Follow K+ and Mg++ with the increase in the furosemide    4-Acute on Chronic Hypercarbic Resp Failure with an Acute on Chronic Resp Acidosis with a compensatory Metabolic Alkalosis   pCO2 down to 67 on the AVAPS  PLAN:  Follow HCO3 and the ABG's    5- HTN with CKD I-IV  BP goal <130/80(ACC/AHA Target)-BP at/near goal  PLAN:  Follow BP    Thank you  for allowing us to participate in care of Aggie Mckeon MD  2:50 PM  3/7/2025

## 2025-03-07 NOTE — PROGRESS NOTES
Notified Dr. Avendano of SLP barium swallow results. OK to resume diet per SLP recommendations, will possibly resume PO meds mark if pt does OK overnight.

## 2025-03-07 NOTE — PROGRESS NOTES
Physical Therapy  Facility/Department: [address] INTERMEDIATE 1    Name: Aggie Carter  : 1945  MRN: [id number]  Date of Service: 3/7/2025      Attempted to see pt for PT evaluation, hold per nursing.  Criselda Gonzalez PT [id number]

## 2025-03-07 NOTE — PROGRESS NOTES
SPEECH/LANGUAGE PATHOLOGY  VIDEOFLUOROSCOPIC STUDY OF SWALLOWING (MBS)   and PLAN OF CARE    PATIENT NAME:  Aggie Carter  (female)     MRN:  [id number]    :  1945  (79 y.o.)  STATUS:  Inpatient: Room 0436/0436-A    TODAY'S DATE:  3/7/2025  ORDER DATE, DESCRIPTION AND REFERRING PROVIDER:  Dr. Avendano : FL MODIFIED BARIUM SWALLOW W VIDEO :   REASON FOR REFERRAL: Assess oropharyngeal swallow function    EVALUATING THERAPIST: Naina Mckeon, SLP      RESULTS:      DYSPHAGIA DIAGNOSIS:  Clinical indicators of mild  oropharyngeal phase dysphagia     Patient with consistent wet cough throughout assessment despite no visualized aspiration.     DIET RECOMMENDATIONS:  Minced and moist consistency solids (IDDSI level 5) with  thin liquids (IDDSI level 0)    FEEDING RECOMMENDATIONS:    Assistance level:  Set-up is required for all oral intake     Compensatory strategies recommended: Fully alert for all PO, Thorough oral care to prevent colonization of oral bacteria, Upright in bed/ chair as tolerated  SINGLE cup sips  SINGLE straw sips   SMALL bites     Discussed recommendations with:  patient nurse in person    Laryngeal Penetration and Aspiration:  Penetration WITHOUT aspiration was observed in today's study with  thin liquid, nectar     SPEECH THERAPY  PLAN OF CARE   The dysphagia POC is established based on physician order and dysphagia diagnosis    Skilled SLP intervention for dysphagia management on acute care up to 5 x per week until goals met, pt plateaus in function and/or discharged from hospital      Conditions Requiring Skilled Therapeutic Intervention for dysphagia:    Patient is performing below functional baseline d/t  current acute condition, Multiple diagnoses, multiple medications, and increased dependency upon caregivers.  Incoordination of swallow/breathing pattern due to compromised respiratory system    SPECIFIC DYSPHAGIA INTERVENTIONS TO INCLUDE:     compensatory swallowing strategies to improve

## 2025-03-08 LAB
ANION GAP SERPL CALCULATED.3IONS-SCNC: 4 MMOL/L (ref 7–16)
B.E.: 18.8 MMOL/L (ref -3–3)
B.E.: 22.5 MMOL/L (ref -3–3)
BASOPHILS # BLD: 0 K/UL (ref 0–0.2)
BASOPHILS NFR BLD: 0 % (ref 0–2)
BUN SERPL-MCNC: 59 MG/DL (ref 6–23)
CALCIUM SERPL-MCNC: 9.2 MG/DL (ref 8.6–10.2)
CHLORIDE SERPL-SCNC: 90 MMOL/L (ref 98–107)
CO2 SERPL-SCNC: 49 MMOL/L (ref 22–29)
COHB: 0.8 % (ref 0–1.5)
COHB: 1.1 % (ref 0–1.5)
COMMENT: ABNORMAL
CREAT SERPL-MCNC: 1.3 MG/DL (ref 0.5–1)
CRITICAL: ABNORMAL
CRITICAL: ABNORMAL
DATE ANALYZED: ABNORMAL
DATE ANALYZED: ABNORMAL
DATE OF COLLECTION: ABNORMAL
DATE OF COLLECTION: ABNORMAL
EOSINOPHIL # BLD: 0 K/UL (ref 0.05–0.5)
EOSINOPHILS RELATIVE PERCENT: 0 % (ref 0–6)
ERYTHROCYTE [DISTWIDTH] IN BLOOD BY AUTOMATED COUNT: 16.6 % (ref 11.5–15)
FIO2: 50 %
GFR, ESTIMATED: 42 ML/MIN/1.73M2
GLUCOSE SERPL-MCNC: 115 MG/DL (ref 74–99)
HCO3: 47.8 MMOL/L (ref 22–26)
HCO3: 51.1 MMOL/L (ref 22–26)
HCT VFR BLD AUTO: 42.6 % (ref 34–48)
HGB BLD-MCNC: 12.4 G/DL (ref 11.5–15.5)
HHB: 4.4 % (ref 0–5)
HHB: 7.8 % (ref 0–5)
IMM GRANULOCYTES # BLD AUTO: 0.04 K/UL (ref 0–0.58)
IMM GRANULOCYTES NFR BLD: 1 % (ref 0–5)
LAB: ABNORMAL
LAB: ABNORMAL
LYMPHOCYTES NFR BLD: 0.43 K/UL (ref 1.5–4)
LYMPHOCYTES RELATIVE PERCENT: 6 % (ref 20–42)
Lab: 1616
Lab: 615
MAGNESIUM SERPL-MCNC: 1.9 MG/DL (ref 1.6–2.6)
MCH RBC QN AUTO: 27.1 PG (ref 26–35)
MCHC RBC AUTO-ENTMCNC: 29.1 G/DL (ref 32–34.5)
MCV RBC AUTO: 93.2 FL (ref 80–99.9)
METHB: 0.3 % (ref 0–1.5)
METHB: 0.3 % (ref 0–1.5)
MODE: ABNORMAL
MODE: ABNORMAL
MONOCYTES NFR BLD: 0.3 K/UL (ref 0.1–0.95)
MONOCYTES NFR BLD: 4 % (ref 2–12)
NEUTROPHILS NFR BLD: 89 % (ref 43–80)
NEUTS SEG NFR BLD: 6.32 K/UL (ref 1.8–7.3)
O2 CONTENT: 17.8 ML/DL
O2 CONTENT: 18.4 ML/DL
O2 SATURATION: 92.1 % (ref 92–98.5)
O2 SATURATION: 95.6 % (ref 92–98.5)
O2HB: 90.8 % (ref 94–97)
O2HB: 94.5 % (ref 94–97)
OPERATOR ID: 166
OPERATOR ID: ABNORMAL
PARTIAL THROMBOPLASTIN TIME: 75.7 SEC (ref 24.5–35.1)
PATIENT TEMP: 37 C
PATIENT TEMP: 37 C
PCO2: 75.2 MMHG (ref 35–45)
PCO2: 75.8 MMHG (ref 35–45)
PEEP/CPAP: 5 CMH2O
PFO2: 1.51 MMHG/%
PH BLOOD GAS: 7.42 (ref 7.35–7.45)
PH BLOOD GAS: 7.45 (ref 7.35–7.45)
PLATELET, FLUORESCENCE: 145 K/UL (ref 130–450)
PMV BLD AUTO: 11.1 FL (ref 7–12)
PO2: 64.3 MMHG (ref 75–100)
PO2: 75.5 MMHG (ref 75–100)
POTASSIUM SERPL-SCNC: 4 MMOL/L (ref 3.5–5)
RBC # BLD AUTO: 4.57 M/UL (ref 3.5–5.5)
RBC # BLD: ABNORMAL 10*6/UL
RI(T): 2.6
RR MECHANICAL: 22 B/MIN
SODIUM SERPL-SCNC: 143 MMOL/L (ref 132–146)
SOURCE, BLOOD GAS: ABNORMAL
SOURCE, BLOOD GAS: ABNORMAL
THB: 13.4 G/DL (ref 11.5–16.5)
THB: 14.4 G/DL (ref 11.5–16.5)
TIME ANALYZED: 1627
TIME ANALYZED: 618
VT MECHANICAL: 500 ML
WBC # BLD: ABNORMAL 10*3/UL
WBC # BLD: ABNORMAL 10*3/UL
WBC OTHER # BLD: 7.1 K/UL (ref 4.5–11.5)

## 2025-03-08 PROCEDURE — 6360000002 HC RX W HCPCS: Performed by: INTERNAL MEDICINE

## 2025-03-08 PROCEDURE — 2500000003 HC RX 250 WO HCPCS: Performed by: NURSE PRACTITIONER

## 2025-03-08 PROCEDURE — 6370000000 HC RX 637 (ALT 250 FOR IP): Performed by: NURSE PRACTITIONER

## 2025-03-08 PROCEDURE — 85730 THROMBOPLASTIN TIME PARTIAL: CPT

## 2025-03-08 PROCEDURE — 82805 BLOOD GASES W/O2 SATURATION: CPT

## 2025-03-08 PROCEDURE — 2060000000 HC ICU INTERMEDIATE R&B

## 2025-03-08 PROCEDURE — 85025 COMPLETE CBC W/AUTO DIFF WBC: CPT

## 2025-03-08 PROCEDURE — 83735 ASSAY OF MAGNESIUM: CPT

## 2025-03-08 PROCEDURE — 6360000002 HC RX W HCPCS: Performed by: NURSE PRACTITIONER

## 2025-03-08 PROCEDURE — 94660 CPAP INITIATION&MGMT: CPT

## 2025-03-08 PROCEDURE — 2700000000 HC OXYGEN THERAPY PER DAY

## 2025-03-08 PROCEDURE — 94640 AIRWAY INHALATION TREATMENT: CPT

## 2025-03-08 PROCEDURE — 2580000003 HC RX 258: Performed by: INTERNAL MEDICINE

## 2025-03-08 PROCEDURE — 80048 BASIC METABOLIC PNL TOTAL CA: CPT

## 2025-03-08 RX ORDER — METHYLPREDNISOLONE SODIUM SUCCINATE 40 MG/ML
40 INJECTION INTRAMUSCULAR; INTRAVENOUS DAILY
Status: COMPLETED | OUTPATIENT
Start: 2025-03-08 | End: 2025-03-09

## 2025-03-08 RX ADMIN — SODIUM CHLORIDE, PRESERVATIVE FREE 10 ML: 5 INJECTION INTRAVENOUS at 08:36

## 2025-03-08 RX ADMIN — MICONAZOLE NITRATE: 20 POWDER TOPICAL at 08:37

## 2025-03-08 RX ADMIN — FUROSEMIDE 40 MG: 10 INJECTION, SOLUTION INTRAMUSCULAR; INTRAVENOUS at 08:35

## 2025-03-08 RX ADMIN — ARFORMOTEROL TARTRATE 15 MCG: 15 SOLUTION RESPIRATORY (INHALATION) at 08:34

## 2025-03-08 RX ADMIN — FUROSEMIDE 40 MG: 10 INJECTION, SOLUTION INTRAMUSCULAR; INTRAVENOUS at 18:07

## 2025-03-08 RX ADMIN — SODIUM CHLORIDE, PRESERVATIVE FREE 10 ML: 5 INJECTION INTRAVENOUS at 22:20

## 2025-03-08 RX ADMIN — DEXTROSE MONOHYDRATE: 50 INJECTION, SOLUTION INTRAVENOUS at 06:02

## 2025-03-08 RX ADMIN — BUDESONIDE INHALATION 500 MCG: 0.5 SUSPENSION RESPIRATORY (INHALATION) at 08:34

## 2025-03-08 RX ADMIN — ANORECTAL OINTMENT: 15.7; .44; 24; 20.6 OINTMENT TOPICAL at 22:21

## 2025-03-08 RX ADMIN — MICONAZOLE NITRATE: 20 POWDER TOPICAL at 22:19

## 2025-03-08 RX ADMIN — METOPROLOL TARTRATE 5 MG: 5 INJECTION INTRAVENOUS at 22:20

## 2025-03-08 RX ADMIN — AMIODARONE HYDROCHLORIDE 200 MG: 200 TABLET ORAL at 22:20

## 2025-03-08 RX ADMIN — METHYLPREDNISOLONE SODIUM SUCCINATE 40 MG: 40 INJECTION INTRAMUSCULAR; INTRAVENOUS at 11:48

## 2025-03-08 RX ADMIN — HEPARIN SODIUM 9 UNITS/KG/HR: 10000 INJECTION, SOLUTION INTRAVENOUS at 05:59

## 2025-03-08 RX ADMIN — MONTELUKAST SODIUM 10 MG: 10 TABLET, COATED ORAL at 22:20

## 2025-03-08 RX ADMIN — METOPROLOL TARTRATE 5 MG: 5 INJECTION INTRAVENOUS at 08:35

## 2025-03-08 RX ADMIN — IPRATROPIUM BROMIDE AND ALBUTEROL SULFATE 1 DOSE: .5; 3 SOLUTION RESPIRATORY (INHALATION) at 01:15

## 2025-03-08 RX ADMIN — ANORECTAL OINTMENT: 15.7; .44; 24; 20.6 OINTMENT TOPICAL at 08:36

## 2025-03-08 RX ADMIN — METOPROLOL TARTRATE 5 MG: 5 INJECTION INTRAVENOUS at 14:30

## 2025-03-08 RX ADMIN — METOPROLOL TARTRATE 5 MG: 5 INJECTION INTRAVENOUS at 02:34

## 2025-03-08 RX ADMIN — BUDESONIDE INHALATION 500 MCG: 0.5 SUSPENSION RESPIRATORY (INHALATION) at 18:00

## 2025-03-08 RX ADMIN — ARFORMOTEROL TARTRATE 15 MCG: 15 SOLUTION RESPIRATORY (INHALATION) at 18:00

## 2025-03-08 RX ADMIN — PETROLATUM: 420 OINTMENT TOPICAL at 08:38

## 2025-03-08 RX ADMIN — PETROLATUM: 420 OINTMENT TOPICAL at 22:20

## 2025-03-08 ASSESSMENT — PAIN SCALES - GENERAL: PAINLEVEL_OUTOF10: 0

## 2025-03-08 NOTE — PLAN OF CARE
Problem: Chronic Conditions and Co-morbidities  Goal: Patient's chronic conditions and co-morbidity symptoms are monitored and maintained or improved  3/8/2025 0508 by Milli Casarez RN  Outcome: Progressing     Problem: Discharge Planning  Goal: Discharge to home or other facility with appropriate resources  3/8/2025 0508 by Milli Casarez RN  Outcome: Progressing     Problem: Pain  Goal: Verbalizes/displays adequate comfort level or baseline comfort level  3/8/2025 0508 by Milli Casarez RN  Outcome: Progressing     Problem: Safety - Adult  Goal: Free from fall injury  3/8/2025 0508 by Milli Casarez RN  Outcome: Progressing     Problem: ABCDS Injury Assessment  Goal: Absence of physical injury  3/8/2025 0508 by Milli Casarez RN  Outcome: Progressing     Problem: Skin/Tissue Integrity  Goal: Skin integrity remains intact  Description: 1.  Monitor for areas of redness and/or skin breakdown  2.  Assess vascular access sites hourly  3.  Every 4-6 hours minimum:  Change oxygen saturation probe site  4.  Every 4-6 hours:  If on nasal continuous positive airway pressure, respiratory therapy assess nares and determine need for appliance change or resting period  3/8/2025 0508 by Milli Casarez RN  Outcome: Progressing

## 2025-03-08 NOTE — PROGRESS NOTES
[facility]  Nephrology Progress Note  Patient's Name: Aggie Carter  1:57 PM  3/8/2025    Nephrologist: SASHA Mckeon MD    Reason for Consult:  CKD  Requesting Physician: Dr. LUIS Hummel    Chief Complaint:  Fluid overload and Afib    History of Present Ilness from the 2/17/25 note:    Aggie Carter is a 79 y.o. female with prior history of CKD G3B with a baseline serum cr 1.6mg/dl with an e-GFR=32ml/min presumed sec to microvascular disease from HTN, dyslipidemia. Pt was admitted on 2/19/25 for vol overload and Afib. She had an episode of Afib with RVR and she told ED MD she had a 21# wt gain. She told me her MD in [place] had once told her diuretics caused kidney problems and she cut back on the dose, but then her legs began to swell. She noted she had AUSTIN.    Full <Consult deferred as pt just seen on 3/3/25: Addendum to the above: Pt was D/[facility] on 3/3/25 to the [facility] and [facility] 3/5/25 with the cc SOB. She had been D/Naman on 3L O by NC and at the [facility] she was on 5L O2 by NC and had an O2sat 80% per the ED note.  In the ED the PCO2 113 and princess to a pCO2 130. She is on AVAPS this AM and last pCO2 97    INTERVAL HX:    3/7/25: Pt awake alert up in bed on NC at the time of my visit. She states she does feel SOB, but better than when she came in. She states she does not have her teeth and eating id difficult    3/8/2025: Patient seen and examined.  On AVAPS.  Making good urine output nearly 2 L.  Blood pressures are stable 120s to 140s over 60s to 70s.  Patient has persistently high CO2 though this and renal function both are fairly stable at baseline with creatinine around 1.3.  Patient has no acute complaints this morning.    Past Medical History:   Diagnosis Date    Atrial fibrillation (HCC)     Dyspnea on exertion     Family history of heart disease     Hyperlipidemia     Hypertension     Mild aortic stenosis 05/10/2013       Past Surgical History:   Procedure Laterality Date    TUBAL LIGATION    pleural effusion or pneumothorax.    Upper Abdomen: Limited images of the upper abdomen are unremarkable.    Soft Tissues/Bones: No acute bone or soft tissue abnormality.  Degenerative  changes thoracic spine.    Impression:      No evidence of pulmonary embolism or acute pulmonary abnormality.    EXAMINATION:  ULTRASOUND OF THE RETROPERITONEUM1/17/2025 11:54 am     COMPARISON:  None.     HISTORY:  ORDERING SYSTEM PROVIDED HISTORY: Chronic kidney disease (CKD) stage G3b/A1,  moderately decreased glomerular filtration rate (GFR) between 30-44  mL/min/1.73 square meter and albuminuria creatinine ratio less than 30 mg/g  ([facility])  TECHNOLOGIST PROVIDED HISTORY:     What reading provider will be dictating this exam?->CRC     FINDINGS:  Pre void volume of the urinary bladder is 147 mL.  There is no gross bladder  wall abnormality or intraluminal mass or calculus disease.  Bilateral  ureteral jets are confirmed with color Doppler imaging.     Right kidney measures 10.8 x 4.7 x 4.0 cm which generalized increased renal  parenchymal echotexture with no cortical thinning.  There is no calculus  disease or hydronephrosis or perinephric fluid.     Left kidney measures 10.7 x 4.2 x 4.6 cm with normal cortical thickness and  no evidence of hydronephrosis or calculus disease or perinephric fluid.  There is a simple cortical cyst measuring 3.6 cm in the left kidney.     IMPRESSION:  1. Generalized increased renal parenchymal echotexture consistent with  nonspecific medical renal disease.  2. No evidence of hydronephrosis or renal calculus disease.  3. Grossly normal evaluation of the urinary bladder.    2/202/5 2 D ECHO:  Left Ventricle Normal left ventricular systolic function. EF by visual approximation is 60%. Left ventricle size is normal. Moderate concentric hypertrophy. Indeterminate diastolic function.  Left Atrium Left atrium is mildly dilated.  Right Ventricle Right ventricle is dilated. Low normal systolic function. TAPSE is

## 2025-03-08 NOTE — PROGRESS NOTES
Rafa Ramires M.D.,[facility]  Sher Corbett D.O., F.AMY., [facility]  Jessi Griffin M.D.  Ellen Madrid M.D.   Jose Luis Caballero D.O.  Petey Thibodeaux M.D.         Daily Pulmonary Progress Note    Patient:  Aggie Carter 79 y.o. female MRN: [id number]            Synopsis     We are following patient for recurrent resp acidosis on bipap, titrate bipap for snf    \"CC\" AMS, SOB    Code status: FULL        Subjective      Patient was seen and examined. Sleeping on AVAPS comfortably. No acute issues. Asking for something to drink.     Review of Systems:  Constitutional: Denies fever, weight loss, night sweats, and fatigue  Skin: Denies pigmentation, dark lesions, and rashes   HEENT: Denies hearing loss, tinnitus, ear drainage, epistaxis, sore throat, and hoarseness.  Cardiovascular: Denies palpitations, chest pain, and chest pressure.  Respiratory: moist cough, upper airway wheeze, hypoxia  Gastrointestinal: Denies nausea, vomiting, poor appetite, diarrhea, heartburn or reflux  Genitourinary: Denies dysuria, frequency, urgency or hematuria  Musculoskeletal: Denies myalgias, muscle weakness, and bone pain intermittent confusion  Neurological: Denies dizziness, vertigo, headache, and focal weakness  Psychological: Denies anxiety and depression  Endocrine: Denies heat intolerance and cold intolerance  Hematopoietic/Lymphatic: Denies bleeding problems and blood transfusions    24-hour events:  None     Objective   OBJECTIVE:   /67   Pulse 77   Temp 98 °F (36.7 °C) (Oral)   Resp 26   Ht 1.651 m (5' 5\")   Wt 129.2 kg (284 lb 13.4 oz)   SpO2 96%   PF (!) 23 L/min   BMI 47.40 kg/m²   SpO2 Readings from Last 1 Encounters:   03/08/25 96%        I/O:    Intake/Output Summary (Last 24 hours) at 3/8/2025 1232  Last data filed at 3/8/2025 0303  Gross per 24 hour   Intake 240 ml   Output 1400 ml   Net -1160 ml                CPAP/EPAP: 8 cmH2O     CURRENT MEDS :  Scheduled Meds:   methylPREDNISolone  40 mg

## 2025-03-08 NOTE — PROGRESS NOTES
40 BID IV.  Net volume neg 1.4L.  Heparin drip, unable take xarelto for A fib.  Rate controlled - on BB IV. Transition to PO BB when able.  On bipap overnight - resp acidosis improved. Monitor for contraction alkaosis on diuresis. Renal function and potassium stable. Sodium 147, from 149.  Cont LABA and ICS nebs. Duonebs.  IV steroids, then PO prednisone.  Try to cycle patient on/off bipap.      3/8/25  Neg 2.5L net volume.  On lasix 40 BID IV.  Could transition to PO lasix but still on bipap.  On bipap, pCO2 this morning 75, up from yesterday 67 but normal pH so compensated.  Labs not showing contraction alkalosis.  Weight now at admit weight.  Will continue bipap. Pulm following. Will recheck ABG in 2 hrs.  May allow to cycle 4 hrs on bipap, 2 hrs off if compensated. Follow mentation and labs.  Cardiology signed off. Renal function at baseline. Potassium and mag stable.   Cont duonebs, LABA, and ICS.  Change IV steroid to 40 daily; transition to prednisone orally when able.   Speech and modified swallow neg for aspiration at this time.   Sodium normalized.  See if can wean off bipap based on abg in afternoon.  A fib rate controlled. On heparin drip and IV BB.          Code Status:  full  Consultants:  pulm and nephrology  DVT Prophylaxis - heparin drip  PT/OT -unable to do on bipap  Discharge planning - back to [facility]     Dwight Avendano MD  11:57 AM  3/8/2025

## 2025-03-09 LAB
ANION GAP SERPL CALCULATED.3IONS-SCNC: 6 MMOL/L (ref 7–16)
B.E.: 18.1 MMOL/L (ref -3–3)
BNP SERPL-MCNC: 3077 PG/ML (ref 0–450)
BUN SERPL-MCNC: 57 MG/DL (ref 6–23)
CALCIUM SERPL-MCNC: 9.1 MG/DL (ref 8.6–10.2)
CHLORIDE SERPL-SCNC: 88 MMOL/L (ref 98–107)
CO2 SERPL-SCNC: 47 MMOL/L (ref 22–29)
COHB: 0.9 % (ref 0–1.5)
CREAT SERPL-MCNC: 1.3 MG/DL (ref 0.5–1)
CRITICAL: ABNORMAL
DATE ANALYZED: ABNORMAL
DATE OF COLLECTION: ABNORMAL
GFR, ESTIMATED: 42 ML/MIN/1.73M2
GLUCOSE SERPL-MCNC: 132 MG/DL (ref 74–99)
HCO3: 47.9 MMOL/L (ref 22–26)
HHB: 3.2 % (ref 0–5)
LAB: ABNORMAL
Lab: 917
MAGNESIUM SERPL-MCNC: 1.9 MG/DL (ref 1.6–2.6)
METHB: 0.3 % (ref 0–1.5)
MODE: ABNORMAL
O2 CONTENT: 18.6 ML/DL
O2 SATURATION: 96.8 % (ref 92–98.5)
O2HB: 95.6 % (ref 94–97)
OPERATOR ID: 46
PARTIAL THROMBOPLASTIN TIME: 75.5 SEC (ref 24.5–35.1)
PATIENT TEMP: 37 C
PCO2: 83.6 MMHG (ref 35–45)
PH BLOOD GAS: 7.38 (ref 7.35–7.45)
PO2: 88.2 MMHG (ref 75–100)
POTASSIUM SERPL-SCNC: 3.8 MMOL/L (ref 3.5–5)
SODIUM SERPL-SCNC: 141 MMOL/L (ref 132–146)
SOURCE, BLOOD GAS: ABNORMAL
THB: 13.8 G/DL (ref 11.5–16.5)
TIME ANALYZED: 929

## 2025-03-09 PROCEDURE — 6360000002 HC RX W HCPCS: Performed by: NURSE PRACTITIONER

## 2025-03-09 PROCEDURE — 6370000000 HC RX 637 (ALT 250 FOR IP): Performed by: NURSE PRACTITIONER

## 2025-03-09 PROCEDURE — 83735 ASSAY OF MAGNESIUM: CPT

## 2025-03-09 PROCEDURE — 94660 CPAP INITIATION&MGMT: CPT

## 2025-03-09 PROCEDURE — 2500000003 HC RX 250 WO HCPCS: Performed by: NURSE PRACTITIONER

## 2025-03-09 PROCEDURE — 2580000003 HC RX 258: Performed by: INTERNAL MEDICINE

## 2025-03-09 PROCEDURE — 83880 ASSAY OF NATRIURETIC PEPTIDE: CPT

## 2025-03-09 PROCEDURE — 2700000000 HC OXYGEN THERAPY PER DAY

## 2025-03-09 PROCEDURE — 80048 BASIC METABOLIC PNL TOTAL CA: CPT

## 2025-03-09 PROCEDURE — 94640 AIRWAY INHALATION TREATMENT: CPT

## 2025-03-09 PROCEDURE — 85730 THROMBOPLASTIN TIME PARTIAL: CPT

## 2025-03-09 PROCEDURE — 6370000000 HC RX 637 (ALT 250 FOR IP): Performed by: INTERNAL MEDICINE

## 2025-03-09 PROCEDURE — 6360000002 HC RX W HCPCS: Performed by: INTERNAL MEDICINE

## 2025-03-09 PROCEDURE — 2060000000 HC ICU INTERMEDIATE R&B

## 2025-03-09 PROCEDURE — 82805 BLOOD GASES W/O2 SATURATION: CPT

## 2025-03-09 RX ORDER — FUROSEMIDE 40 MG/1
40 TABLET ORAL 2 TIMES DAILY
Status: DISCONTINUED | OUTPATIENT
Start: 2025-03-09 | End: 2025-03-12 | Stop reason: HOSPADM

## 2025-03-09 RX ADMIN — PETROLATUM: 420 OINTMENT TOPICAL at 08:45

## 2025-03-09 RX ADMIN — ANORECTAL OINTMENT: 15.7; .44; 24; 20.6 OINTMENT TOPICAL at 21:01

## 2025-03-09 RX ADMIN — METHYLPREDNISOLONE SODIUM SUCCINATE 40 MG: 40 INJECTION INTRAMUSCULAR; INTRAVENOUS at 08:44

## 2025-03-09 RX ADMIN — ANORECTAL OINTMENT: 15.7; .44; 24; 20.6 OINTMENT TOPICAL at 08:45

## 2025-03-09 RX ADMIN — RIVAROXABAN 15 MG: 15 TABLET, FILM COATED ORAL at 18:35

## 2025-03-09 RX ADMIN — MICONAZOLE NITRATE: 20 POWDER TOPICAL at 08:45

## 2025-03-09 RX ADMIN — METOPROLOL SUCCINATE 25 MG: 25 TABLET, EXTENDED RELEASE ORAL at 21:00

## 2025-03-09 RX ADMIN — PETROLATUM: 420 OINTMENT TOPICAL at 21:01

## 2025-03-09 RX ADMIN — BUDESONIDE INHALATION 500 MCG: 0.5 SUSPENSION RESPIRATORY (INHALATION) at 19:49

## 2025-03-09 RX ADMIN — ARFORMOTEROL TARTRATE 15 MCG: 15 SOLUTION RESPIRATORY (INHALATION) at 19:49

## 2025-03-09 RX ADMIN — DEXTROSE MONOHYDRATE: 50 INJECTION, SOLUTION INTRAVENOUS at 01:44

## 2025-03-09 RX ADMIN — FUROSEMIDE 40 MG: 40 TABLET ORAL at 18:35

## 2025-03-09 RX ADMIN — LEVOTHYROXINE SODIUM 150 MCG: 75 TABLET ORAL at 05:01

## 2025-03-09 RX ADMIN — HEPARIN SODIUM 9 UNITS/KG/HR: 10000 INJECTION, SOLUTION INTRAVENOUS at 06:21

## 2025-03-09 RX ADMIN — AMIODARONE HYDROCHLORIDE 200 MG: 200 TABLET ORAL at 21:00

## 2025-03-09 RX ADMIN — SODIUM CHLORIDE, PRESERVATIVE FREE 10 ML: 5 INJECTION INTRAVENOUS at 21:00

## 2025-03-09 RX ADMIN — METOPROLOL TARTRATE 5 MG: 5 INJECTION INTRAVENOUS at 08:44

## 2025-03-09 RX ADMIN — SODIUM CHLORIDE, PRESERVATIVE FREE 10 ML: 5 INJECTION INTRAVENOUS at 08:44

## 2025-03-09 RX ADMIN — BUDESONIDE INHALATION 500 MCG: 0.5 SUSPENSION RESPIRATORY (INHALATION) at 08:20

## 2025-03-09 RX ADMIN — ARFORMOTEROL TARTRATE 15 MCG: 15 SOLUTION RESPIRATORY (INHALATION) at 08:20

## 2025-03-09 RX ADMIN — MONTELUKAST SODIUM 10 MG: 10 TABLET, COATED ORAL at 21:00

## 2025-03-09 RX ADMIN — AMIODARONE HYDROCHLORIDE 200 MG: 200 TABLET ORAL at 08:44

## 2025-03-09 RX ADMIN — METOPROLOL TARTRATE 5 MG: 5 INJECTION INTRAVENOUS at 04:58

## 2025-03-09 RX ADMIN — MICONAZOLE NITRATE: 20 POWDER TOPICAL at 21:01

## 2025-03-09 RX ADMIN — FUROSEMIDE 40 MG: 10 INJECTION, SOLUTION INTRAMUSCULAR; INTRAVENOUS at 08:44

## 2025-03-09 ASSESSMENT — PAIN SCALES - GENERAL
PAINLEVEL_OUTOF10: 0

## 2025-03-09 NOTE — PLAN OF CARE
Problem: Safety - Adult  Goal: Free from fall injury  3/9/2025 0942 by Swapna Majano RN  Outcome: Progressing  3/9/2025 0404 by Tong Argueta RN  Outcome: Progressing  3/8/2025 1851 by Juan Monzon RN  Outcome: Progressing     Problem: Skin/Tissue Integrity  Goal: Skin integrity remains intact  Description: 1.  Monitor for areas of redness and/or skin breakdown  2.  Assess vascular access sites hourly  3.  Every 4-6 hours minimum:  Change oxygen saturation probe site  4.  Every 4-6 hours:  If on nasal continuous positive airway pressure, respiratory therapy assess nares and determine need for appliance change or resting period  3/9/2025 0942 by Swapna Majano RN  Outcome: Progressing  3/9/2025 0404 by Tong Argueta RN  Outcome: Progressing  Flowsheets (Taken 3/9/2025 0402)  Skin Integrity Remains Intact: Monitor for areas of redness and/or skin breakdown  3/8/2025 1851 by Juan Monzon RN  Outcome: Progressing     Problem: Chronic Conditions and Co-morbidities  Goal: Patient's chronic conditions and co-morbidity symptoms are monitored and maintained or improved  3/9/2025 0942 by Swapna Majano RN  Outcome: Progressing  3/9/2025 0404 by Tong Argueta RN  Outcome: Progressing     Problem: Pain  Goal: Verbalizes/displays adequate comfort level or baseline comfort level  3/9/2025 0942 by Swapna Majano RN  Outcome: Progressing  3/9/2025 0404 by Tong Argueta RN  Outcome: Progressing

## 2025-03-09 NOTE — PROGRESS NOTES
[facility]   Progress note      Subjective:    Follow-up hypercapnic resp failure.      The patient is awake and alert.    No acute events overnight.    Denies chest pain, angina, SOB.    Objective:    /61   Pulse 93   Temp 98 °F (36.7 °C) (Oral)   Resp 20   Ht 1.651 m (5' 5\")   Wt 129.2 kg (284 lb 13.4 oz)   SpO2 97%   PF (!) 23 L/min   BMI 47.40 kg/m²     In: 100 [P.O.:100]  Out: 2450   In: 100   Out: 2450 [Urine:2450]    Physical Exam  Vitals reviewed.   Constitutional:       General: She is not in acute distress.     Appearance: Normal appearance. She is obese.   HENT:      Head: Normocephalic and atraumatic.   Cardiovascular:      Rate and Rhythm: Normal rate. Rhythm irregular.      Pulses: Normal pulses.      Heart sounds: No murmur heard.  Pulmonary:      Effort: Pulmonary effort is normal. No respiratory distress.      Breath sounds: No wheezing or rales.      Comments: Decreased slightly at both bases  Abdominal:      General: Abdomen is flat. Bowel sounds are normal.      Palpations: Abdomen is soft.      Tenderness: There is no abdominal tenderness.   Musculoskeletal:      Right lower leg: Edema (mostly non-pitting edema) present.      Left lower leg: Edema (mostly non-pitting edema) present.   Skin:     General: Skin is warm.      Findings: No rash.   Neurological:      General: No focal deficit present.      Mental Status: She is alert and oriented to person, place, and time. Mental status is at baseline.   Psychiatric:         Mood and Affect: Mood normal.         Behavior: Behavior normal.           Recent Labs     03/07/25  0500 03/08/25  0241   WBC 6.8 7.1   HGB 12.4 12.4   HCT 44.9 42.6     --        Recent Labs     03/07/25  0500 03/08/25  0241 03/09/25  0500   * 143 141   K 4.2 4.0 3.8   CL 95* 90* 88*   CO2 46* 49* 47*   BUN 56* 59* 57*   CREATININE 1.3* 1.3* 1.3*   CALCIUM 9.2 9.2 9.1       Assessment:    Principal Problem:    Acute hypercapnic

## 2025-03-09 NOTE — PROGRESS NOTES
[facility]  Nephrology Progress Note  Patient's Name: Aggie Carter  1:39 PM  3/9/2025    Nephrologist: SASHA Mckeon MD    Reason for Consult:  CKD  Requesting Physician: Dr. LUIS Hummel    Chief Complaint:  Fluid overload and Afib    History of Present Ilness from the 2/17/25 note:    Aggie Carter is a 79 y.o. female with prior history of CKD G3B with a baseline serum cr 1.6mg/dl with an e-GFR=32ml/min presumed sec to microvascular disease from HTN, dyslipidemia. Pt was admitted on 2/19/25 for vol overload and Afib. She had an episode of Afib with RVR and she told ED MD she had a 21# wt gain. She told me her MD in [place] had once told her diuretics caused kidney problems and she cut back on the dose, but then her legs began to swell. She noted she had AUSTIN.    Full <Consult deferred as pt just seen on 3/3/25: Addendum to the above: Pt was D/Naman on 3/3/25 to the [facility] and returned to the [facility] ED 3/5/25 with the cc SOB. She had been D/Naman on 3L O by NC and at the [facility] she was on 5L O2 by NC and had an O2sat 80% per the ED note.  In the ED the PCO2 113 and princess to a pCO2 130. She is on AVAPS this AM and last pCO2 97    INTERVAL HX:    3/7/25: Pt awake alert up in bed on NC at the time of my visit. She states she does feel SOB, but better than when she came in. She states she does not have her teeth and eating id difficult    3/8/2025: Patient seen and examined.  On AVAPS.  Making good urine output nearly 2 L.  Blood pressures are stable 120s to 140s over 60s to 70s.  Patient has persistently high CO2 though this and renal function both are fairly stable at baseline with creatinine around 1.3.  Patient has no acute complaints this morning.    3/9/2025: Patient seen and examined.  She continues to make good urine output.  She appears more awake, alert and interactive today.  Oxygen requirements have been lowered today.    Past Medical History:   Diagnosis Date    Atrial fibrillation (HCC)     Dyspnea on exertion      03/03/2025 06:00 AM     LDH:  No results found for: \"LDH\"  Uric Acid:    Lab Results   Component Value Date/Time    URICACID 10.3 02/28/2025 04:50 AM     PT/INR:    Lab Results   Component Value Date/Time    PROTIME 11.6 04/14/2019 11:02 AM    INR 1.0 04/14/2019 11:02 AM     PTT:    Lab Results   Component Value Date/Time    APTT 75.5 03/09/2025 05:06 AM    APTT 32.3 04/14/2019 11:02 AM   [APTT}  Troponin:  No results found for: \"TROPONINI\"  U/A:    Lab Results   Component Value Date/Time    COLORU Yellow 02/28/2025 03:00 PM    PROTEINU NEGATIVE 02/28/2025 03:00 PM    PHUR 6.0 02/28/2025 03:00 PM    LEUKOCYTESUR NEGATIVE 02/28/2025 03:00 PM    UROBILINOGEN 0.2 02/28/2025 03:00 PM    BILIRUBINUR NEGATIVE 02/28/2025 03:00 PM    GLUCOSEU NEGATIVE 02/28/2025 03:00 PM     ABG:    Lab Results   Component Value Date/Time    PH 7.376 03/09/2025 09:17 AM    PCO2 83.6 03/09/2025 09:17 AM    PO2 88.2 03/09/2025 09:17 AM    HCO3 47.9 03/09/2025 09:17 AM    BE 18.1 03/09/2025 09:17 AM    O2SAT 96.8 03/09/2025 09:17 AM     HgBA1c:  No results found for: \"LABA1C\"  Microalbumen/Creatinine ratio:  No components found for: \"RUCREAT\"  FLP:    Lab Results   Component Value Date/Time    TRIG 92 03/06/2025 04:48 AM    HDL 32 03/06/2025 04:48 AM     TSH:    Lab Results   Component Value Date/Time    TSH 1.23 03/06/2025 12:07 AM     VITAMIN B12: No components found for: \"B12\"  FOLATE:  No results found for: \"FOLATE\"  Iron Saturation:  No components found for: \"PERCENTFE\"  FERRITIN:    Lab Results   Component Value Date/Time    FERRITIN 612 03/06/2025 12:07 AM     LIPASE:  No results found for: \"LIPASE\"  Fibrinogen Level:  No components found for: \"FIB\"  24 Hour Urine for Protein:  No components found for: \"RAWUPRO\", \"UHRS3\", \"OJLA66JO\", \"UTV3\"  24 Hour Urine for Creatinine Clearance:  No components found for: \"CREAT4\", \"UHRS10\", \"UTV10\"     Imaging:  XR CHEST PORTABLE [[id number]] Collected: 02/27/25 1057     Order Status:

## 2025-03-09 NOTE — PROGRESS NOTES
Rafa Ramires M.D.,[facility]  Sher Corbett D.O., F.AMY., [facility]  Jessi Griffin M.D.  Ellen Madrid M.D.   Jose Luis Caballero D.O.  Petey Thibodeaux M.D.         Daily Pulmonary Progress Note    Patient:  Aggie Carter 79 y.o. female MRN: [id number]            Synopsis     We are following patient for recurrent resp acidosis on bipap, titrate bipap for snf    \"CC\" AMS, SOB    Code status: FULL        Subjective      Patient was seen and examined lying in bed on 6 liters, SpO2 99%, we weaned her down to 3 liters. She is doing ok. Heparin drip infusing.    Review of Systems:  Constitutional: Denies fever, weight loss, night sweats, and fatigue  Skin: Denies pigmentation, dark lesions, and rashes   HEENT: Denies hearing loss, tinnitus, ear drainage, epistaxis, sore throat, and hoarseness.  Cardiovascular: Denies palpitations, chest pain, and chest pressure.  Respiratory: moist cough, upper airway wheeze, hypoxia  Gastrointestinal: Denies nausea, vomiting, poor appetite, diarrhea, heartburn or reflux  Genitourinary: Denies dysuria, frequency, urgency or hematuria  Musculoskeletal: Denies myalgias, muscle weakness, and bone pain intermittent confusion  Neurological: Denies dizziness, vertigo, headache, and focal weakness  Psychological: Denies anxiety and depression  Endocrine: Denies heat intolerance and cold intolerance  Hematopoietic/Lymphatic: Denies bleeding problems and blood transfusions    24-hour events:  None     Objective   OBJECTIVE:   /81   Pulse 87   Temp 97.6 °F (36.4 °C) (Oral)   Resp 22   Ht 1.651 m (5' 5\")   Wt 129.2 kg (284 lb 13.4 oz)   SpO2 95%   PF (!) 23 L/min   BMI 47.40 kg/m²   SpO2 Readings from Last 1 Encounters:   03/09/25 95%        I/O:    Intake/Output Summary (Last 24 hours) at 3/9/2025 1212  Last data filed at 3/9/2025 0800  Gross per 24 hour   Intake 240 ml   Output 2450 ml   Net -2210 ml                CPAP/EPAP: 8 cmH2O     CURRENT MEDS :  Scheduled Meds:

## 2025-03-09 NOTE — PROGRESS NOTES
2 RN skin check completed with Luz AARON. No new skin issues at this time. Electronically signed by Swapna Majano RN on 3/9/2025 at 6:26 PM

## 2025-03-09 NOTE — PLAN OF CARE
Problem: Safety - Adult  Goal: Free from fall injury  3/9/2025 0404 by Tong Argueta, RN  Outcome: Progressing  3/8/2025 1851 by Juan Monzon RN  Outcome: Progressing     Problem: Skin/Tissue Integrity  Goal: Skin integrity remains intact  Description: 1.  Monitor for areas of redness and/or skin breakdown  2.  Assess vascular access sites hourly  3.  Every 4-6 hours minimum:  Change oxygen saturation probe site  4.  Every 4-6 hours:  If on nasal continuous positive airway pressure, respiratory therapy assess nares and determine need for appliance change or resting period  3/9/2025 0404 by Tong Argueta, RN  Outcome: Progressing  Flowsheets (Taken 3/9/2025 0402)  Skin Integrity Remains Intact: Monitor for areas of redness and/or skin breakdown  3/8/2025 1851 by Juan Monzon RN  Outcome: Progressing     Problem: Chronic Conditions and Co-morbidities  Goal: Patient's chronic conditions and co-morbidity symptoms are monitored and maintained or improved  Outcome: Progressing     Problem: Pain  Goal: Verbalizes/displays adequate comfort level or baseline comfort level  Outcome: Progressing

## 2025-03-10 LAB
ANION GAP SERPL CALCULATED.3IONS-SCNC: 6 MMOL/L (ref 7–16)
BUN SERPL-MCNC: 49 MG/DL (ref 6–23)
CALCIUM SERPL-MCNC: 9.1 MG/DL (ref 8.6–10.2)
CHLORIDE SERPL-SCNC: 88 MMOL/L (ref 98–107)
CO2 SERPL-SCNC: 48 MMOL/L (ref 22–29)
CREAT SERPL-MCNC: 1.2 MG/DL (ref 0.5–1)
GFR, ESTIMATED: 44 ML/MIN/1.73M2
GLUCOSE SERPL-MCNC: 98 MG/DL (ref 74–99)
MAGNESIUM SERPL-MCNC: 2 MG/DL (ref 1.6–2.6)
POTASSIUM SERPL-SCNC: 3.9 MMOL/L (ref 3.5–5)
SODIUM SERPL-SCNC: 142 MMOL/L (ref 132–146)

## 2025-03-10 PROCEDURE — 94660 CPAP INITIATION&MGMT: CPT

## 2025-03-10 PROCEDURE — 2060000000 HC ICU INTERMEDIATE R&B

## 2025-03-10 PROCEDURE — 80048 BASIC METABOLIC PNL TOTAL CA: CPT

## 2025-03-10 PROCEDURE — 6360000002 HC RX W HCPCS: Performed by: INTERNAL MEDICINE

## 2025-03-10 PROCEDURE — 36415 COLL VENOUS BLD VENIPUNCTURE: CPT

## 2025-03-10 PROCEDURE — 6370000000 HC RX 637 (ALT 250 FOR IP): Performed by: INTERNAL MEDICINE

## 2025-03-10 PROCEDURE — 6360000002 HC RX W HCPCS: Performed by: NURSE PRACTITIONER

## 2025-03-10 PROCEDURE — 97165 OT EVAL LOW COMPLEX 30 MIN: CPT

## 2025-03-10 PROCEDURE — 83735 ASSAY OF MAGNESIUM: CPT

## 2025-03-10 PROCEDURE — 94640 AIRWAY INHALATION TREATMENT: CPT

## 2025-03-10 PROCEDURE — 6370000000 HC RX 637 (ALT 250 FOR IP): Performed by: NURSE PRACTITIONER

## 2025-03-10 PROCEDURE — 2500000003 HC RX 250 WO HCPCS: Performed by: NURSE PRACTITIONER

## 2025-03-10 PROCEDURE — 2700000000 HC OXYGEN THERAPY PER DAY

## 2025-03-10 PROCEDURE — 97161 PT EVAL LOW COMPLEX 20 MIN: CPT

## 2025-03-10 RX ORDER — METHYLPREDNISOLONE SODIUM SUCCINATE 40 MG/ML
40 INJECTION INTRAMUSCULAR; INTRAVENOUS EVERY 12 HOURS
Status: DISCONTINUED | OUTPATIENT
Start: 2025-03-10 | End: 2025-03-11

## 2025-03-10 RX ADMIN — PREDNISONE 20 MG: 20 TABLET ORAL at 08:40

## 2025-03-10 RX ADMIN — RIVAROXABAN 15 MG: 15 TABLET, FILM COATED ORAL at 17:06

## 2025-03-10 RX ADMIN — MONTELUKAST SODIUM 10 MG: 10 TABLET, COATED ORAL at 20:20

## 2025-03-10 RX ADMIN — METHYLPREDNISOLONE SODIUM SUCCINATE 40 MG: 40 INJECTION INTRAMUSCULAR; INTRAVENOUS at 13:25

## 2025-03-10 RX ADMIN — SODIUM CHLORIDE, PRESERVATIVE FREE 10 ML: 5 INJECTION INTRAVENOUS at 08:41

## 2025-03-10 RX ADMIN — METOPROLOL SUCCINATE 25 MG: 25 TABLET, EXTENDED RELEASE ORAL at 20:20

## 2025-03-10 RX ADMIN — BUDESONIDE INHALATION 500 MCG: 0.5 SUSPENSION RESPIRATORY (INHALATION) at 21:33

## 2025-03-10 RX ADMIN — METOPROLOL SUCCINATE 25 MG: 25 TABLET, EXTENDED RELEASE ORAL at 08:40

## 2025-03-10 RX ADMIN — ANORECTAL OINTMENT: 15.7; .44; 24; 20.6 OINTMENT TOPICAL at 20:20

## 2025-03-10 RX ADMIN — PETROLATUM: 420 OINTMENT TOPICAL at 20:20

## 2025-03-10 RX ADMIN — PETROLATUM: 420 OINTMENT TOPICAL at 08:40

## 2025-03-10 RX ADMIN — LEVOTHYROXINE SODIUM 150 MCG: 75 TABLET ORAL at 06:31

## 2025-03-10 RX ADMIN — FUROSEMIDE 40 MG: 40 TABLET ORAL at 08:40

## 2025-03-10 RX ADMIN — SODIUM CHLORIDE, PRESERVATIVE FREE 10 ML: 5 INJECTION INTRAVENOUS at 20:20

## 2025-03-10 RX ADMIN — FUROSEMIDE 40 MG: 40 TABLET ORAL at 17:06

## 2025-03-10 RX ADMIN — ANORECTAL OINTMENT: 15.7; .44; 24; 20.6 OINTMENT TOPICAL at 08:41

## 2025-03-10 RX ADMIN — ARFORMOTEROL TARTRATE 15 MCG: 15 SOLUTION RESPIRATORY (INHALATION) at 21:33

## 2025-03-10 RX ADMIN — BUDESONIDE INHALATION 500 MCG: 0.5 SUSPENSION RESPIRATORY (INHALATION) at 07:42

## 2025-03-10 RX ADMIN — ARFORMOTEROL TARTRATE 15 MCG: 15 SOLUTION RESPIRATORY (INHALATION) at 07:42

## 2025-03-10 RX ADMIN — AMIODARONE HYDROCHLORIDE 200 MG: 200 TABLET ORAL at 20:20

## 2025-03-10 RX ADMIN — MICONAZOLE NITRATE: 20 POWDER TOPICAL at 08:40

## 2025-03-10 RX ADMIN — MICONAZOLE NITRATE: 20 POWDER TOPICAL at 20:20

## 2025-03-10 RX ADMIN — AMIODARONE HYDROCHLORIDE 200 MG: 200 TABLET ORAL at 08:40

## 2025-03-10 ASSESSMENT — PAIN SCALES - GENERAL
PAINLEVEL_OUTOF10: 0
PAINLEVEL_OUTOF10: 0

## 2025-03-10 NOTE — CARE COORDINATION
Social Work/Discharge Planning:  Case Management consult noted.  Patient states she didn't want to return to [facility].  Informed patient that will have representative from [facility] speak with her regarding her concerns.  Nancy with [facility] spoke with patient and her family and confirmed plan for patient to return to [facility] at discharge.  Nancy states facility will start pre-cert tomorrow.  Will continue to follow.  Electronically signed by WINDY Moreno on 3/10/2025 at 3:53 PM

## 2025-03-10 NOTE — PLAN OF CARE
Patient's chart updated to reflect:      .    - HF care plan, HF education points and HF discharge instructions.  -Orders: 2 gram sodium diet, daily weights, I/O.  -PCP or cardiology follow up appointments to be scheduled within 7 days of hospital discharge.  -CHF education session will be provided to the patient prior to hospital discharge.    Sonam Ortega RN   Heart Failure Navigator

## 2025-03-10 NOTE — DISCHARGE INSTRUCTIONS
***HEART FAILURE - CONGESTIVE HEART FAILURE***  DISCHARGE INSTRUCTIONS:  GUIDELINES TO FOLLOW AT CICI/LTAC/SNF/ Assisted Living    Future Appointments   Date Time Provider Department Center   4/1/2025  2:00 PM Abhinav Ceja MD [place] [facility]          MEDICATIONS:  Please notify the doctor if patient is not able to take their medications or if medications are being held for any reasons (such as low blood pressure ect.)  Do not give the patient ibuprofen (Advil or Motrin), naproxen (Aleve) without talking to the doctor first. This could make their heart failure worse.         WEIGHT MONITORING:   Weigh patient every day in the morning after they void (If patient is able to stand, please get a standing weight.)   Notify the doctor of a weight gain of 3 pounds or more in 1 day   OR  a total of 5 pounds or more in 1 week             DIET   Cardiac heart healthy diet:  Low sodium diet: no  more than 2,000mg (2 grams) of salt / sodium per day (which equals to a little less than  a teaspoon of salt)/ Cardiac Diet: Low saturated / low trans fat, no added salt, caffeine restricted    If patient is there for rehab and will be returning home in the near future; reinforce with the patient and the family to follow a low sodium diet (2,000 mg)- avoid using salt at the table, avoid / limit use of canned soups, processed / packaged foods, salted snacks, olives and pickles.  Do not use a salt substitute without checking with the doctor. (Mrs. Vargas is safe to use).       NOTIFY THE DOCTOR THE FIRST DAY OF ONSET OF ANY OF THESE   SYMPTOMS:   Weight gain of 3 pounds or more in 1 day         OR 5 pounds or more in one week  More shortness of breath  More swelling in stomach, legs, ankles or feet  Feeling more tired, No energy  Dry hacky cough  Dizziness  More chest pain / discomfort  Hard time breathing laying down

## 2025-03-10 NOTE — PROGRESS NOTES
Rafa Ramires M.D.,[facility]  Sher Corbett D.O., AYAN., [facility]  Jessi Griffin M.D.  Ellen Madrid M.D.   Jose Luis Caballero D.O.  Petey Thibodeaux M.D.         Daily Pulmonary Progress Note    Patient:  Aggie Carter 79 y.o. female MRN: [id number]            Synopsis     We are following patient for recurrent resp acidosis on bipap, titrate bipap for snf    \"CC\" AMS, SOB    Code status: FULL        Subjective      Patient was seen and examined resting in bed on 3L O2 NC. She cassidy have wheezing on exam and is complaining of a cough. Also, she is using her AVAPS at night.     Review of Systems:  Constitutional: Denies fever, weight loss, night sweats, and fatigue  Skin: Denies pigmentation, dark lesions, and rashes   HEENT: Denies hearing loss, tinnitus, ear drainage, epistaxis, sore throat, and hoarseness.  Cardiovascular: Denies palpitations, chest pain, and chest pressure.  Respiratory: moist cough, upper airway wheeze, hypoxia  Gastrointestinal: Denies nausea, vomiting, poor appetite, diarrhea, heartburn or reflux  Genitourinary: Denies dysuria, frequency, urgency or hematuria  Musculoskeletal: Denies myalgias, muscle weakness, and bone pain intermittent confusion  Neurological: Denies dizziness, vertigo, headache, and focal weakness  Psychological: Denies anxiety and depression  Endocrine: Denies heat intolerance and cold intolerance  Hematopoietic/Lymphatic: Denies bleeding problems and blood transfusions    24-hour events:  None     Objective   OBJECTIVE:   BP (!) 144/76   Pulse 75   Temp 97.3 °F (36.3 °C) (Oral)   Resp 23   Ht 1.651 m (5' 5\")   Wt 136 kg (299 lb 13.2 oz)   SpO2 91%   PF (!) 23 L/min   BMI 49.89 kg/m²   SpO2 Readings from Last 1 Encounters:   03/10/25 91%        I/O:    Intake/Output Summary (Last 24 hours) at 3/10/2025 0904  Last data filed at 3/10/2025 042  Gross per 24 hour   Intake 180 ml   Output 2750 ml   Net -2570 ml                CPAP/EPAP: 8 cmH2O     CURRENT MEDS  DIAGNOSIS:  Clinical indicators of mild  oropharyngeal phase dysphagia      Patient with consistent wet cough throughout assessment despite no visualized aspiration.      DIET RECOMMENDATIONS:  Minced and moist consistency solids (IDDSI level 5) with  thin liquids (IDDSI level 0)     FEEDING RECOMMENDATIONS:              Assistance level:  Set-up is required for all oral intake                Compensatory strategies recommended: Fully alert for all PO, Thorough oral care to prevent colonization of oral bacteria, Upright in bed/ chair as tolerated  SINGLE cup sips  SINGLE straw sips   SMALL bites    Labs:  Lab Results   Component Value Date/Time    WBC 7.1 03/08/2025 02:41 AM    RBC 4.57 03/08/2025 02:41 AM    HGB 12.4 03/08/2025 02:41 AM    HCT 42.6 03/08/2025 02:41 AM    MCV 93.2 03/08/2025 02:41 AM    MCH 27.1 03/08/2025 02:41 AM    MCHC 29.1 03/08/2025 02:41 AM    RDW 16.6 03/08/2025 02:41 AM     03/07/2025 05:00 AM    MPV 11.1 03/08/2025 02:41 AM     Lab Results   Component Value Date/Time     03/10/2025 05:00 AM    K 3.9 03/10/2025 05:00 AM    K 4.1 04/14/2019 11:02 AM    CL 88 03/10/2025 05:00 AM    CO2 48 03/10/2025 05:00 AM    BUN 49 03/10/2025 05:00 AM    CREATININE 1.2 03/10/2025 05:00 AM    CALCIUM 9.1 03/10/2025 05:00 AM    GFRAA >60 04/14/2019 11:02 AM    LABGLOM 44 03/10/2025 05:00 AM     Lab Results   Component Value Date/Time    PROTIME 11.6 04/14/2019 11:02 AM    INR 1.0 04/14/2019 11:02 AM     Recent Labs     03/09/25  0500   PROBNP 3,077*      Latest Reference Range & Units 03/08/25 16:16   Source:  Blood Arterial   pH, Blood Gas 7.350 - 7.450  7.418   PCO2 35.0 - 45.0 mmHg 75.8 (HH)   pO2 75.0 - 100.0 mmHg 64.3 (L)   HCO3 22.0 - 26.0 mmol/L 47.8 (H)   Base Excess -3.0 - 3.0 mmol/L 18.8 (H)   O2 Sat 92.0 - 98.5 % 92.1   THB,THB 11.5 - 16.5 g/dL 14.4   O2Hb 94.0 - 97.0 % 90.8 (L)   Carboxy-Hgb 0.0 - 1.5 % 1.1   METHB,METHB 0.0 - 1.5 % 0.3   HHb 0.0 - 5.0 % 7.8 (H)   O2 Content mL/dL

## 2025-03-10 NOTE — PROGRESS NOTES
[facility]   Progress note      Subjective:    The patient is awake and alert.  Lying in bed without complaints.  No acute events overnight.    Denies chest pain, angina, patient continues on 3 L O2  BiPAP at at bedtime and during naps    Objective:    /83   Pulse 80   Temp 98 °F (36.7 °C) (Oral)   Resp 22   Ht 1.651 m (5' 5\")   Wt 136 kg (299 lb 13.2 oz)   SpO2 93%   PF (!) 23 L/min   BMI 49.89 kg/m²     In: 420 [P.O.:420]  Out: 3850   In: 420   Out: 3850 [Urine:3850]    Physical Exam  Vitals reviewed.   Constitutional:       General: She is not in acute distress.     Appearance: Normal appearance. She is obese.   HENT:      Head: Normocephalic and atraumatic.   Cardiovascular:      Rate and Rhythm: Normal rate. Rhythm irregular.      Pulses: Normal pulses.      Heart sounds: No murmur heard.  Pulmonary:      Effort: Pulmonary effort is normal. No respiratory distress.      Breath sounds: No wheezing or rales.      Comments: Decreased slightly at both bases  Abdominal:      General: Abdomen is flat. Bowel sounds are normal.      Palpations: Abdomen is soft.      Tenderness: There is no abdominal tenderness.   Musculoskeletal:      Right lower leg: Edema (mostly non-pitting edema) present.      Left lower leg: Edema (mostly non-pitting edema) present.   Skin:     General: Skin is warm.      Findings: No rash.   Neurological:      General: No focal deficit present.      Mental Status: She is alert and oriented to person, place, and time. Mental status is at baseline.   Psychiatric:         Mood and Affect: Mood normal.         Behavior: Behavior normal.           Recent Labs     03/08/25  0241   WBC 7.1   HGB 12.4   HCT 42.6       Recent Labs     03/08/25  0241 03/09/25  0500 03/10/25  0500    141 142   K 4.0 3.8 3.9   CL 90* 88* 88*   CO2 49* 47* 48*   BUN 59* 57* 49*   CREATININE 1.3* 1.3* 1.2*   CALCIUM 9.2 9.1 9.1       Assessment:    Principal Problem:    Acute hypercapnic  hours  BUN/creatinine improving 49/1.2  Recheck a.m. lab  leukocytosis expected due to steroid use  Continue DuoNeb treatments     Code Status:  full  Consultants:  pulm and nephrology  DVT Prophylaxis - heparin drip; change to xarelto  PT/OT   Discharge planning - SNF, family/patient want different SNF     SARA Flores - CNP  5:03 PM  3/10/2025

## 2025-03-10 NOTE — PLAN OF CARE
Problem: Safety - Adult  Goal: Free from fall injury  Outcome: Progressing     Problem: Skin/Tissue Integrity  Goal: Skin integrity remains intact  Description: 1.  Monitor for areas of redness and/or skin breakdown  2.  Assess vascular access sites hourly  3.  Every 4-6 hours minimum:  Change oxygen saturation probe site  4.  Every 4-6 hours:  If on nasal continuous positive airway pressure, respiratory therapy assess nares and determine need for appliance change or resting period  Outcome: Progressing     Problem: Chronic Conditions and Co-morbidities  Goal: Patient's chronic conditions and co-morbidity symptoms are monitored and maintained or improved  Outcome: Progressing     Problem: Pain  Goal: Verbalizes/displays adequate comfort level or baseline comfort level  Outcome: Progressing

## 2025-03-10 NOTE — PLAN OF CARE
Problem: Safety - Adult  Goal: Free from fall injury  3/10/2025 0820 by Doris Wesley RN  Outcome: Progressing  3/10/2025 0200 by Tong Argueta RN  Outcome: Progressing     Problem: Skin/Tissue Integrity  Goal: Skin integrity remains intact  Description: 1.  Monitor for areas of redness and/or skin breakdown  2.  Assess vascular access sites hourly  3.  Every 4-6 hours minimum:  Change oxygen saturation probe site  4.  Every 4-6 hours:  If on nasal continuous positive airway pressure, respiratory therapy assess nares and determine need for appliance change or resting period  3/10/2025 0200 by Tong Argueta, RN  Outcome: Progressing     Problem: Chronic Conditions and Co-morbidities  Goal: Patient's chronic conditions and co-morbidity symptoms are monitored and maintained or improved  3/10/2025 0820 by Doris Wesley RN  Outcome: Progressing  3/10/2025 0200 by Tong Argueta RN  Outcome: Progressing     Problem: Pain  Goal: Verbalizes/displays adequate comfort level or baseline comfort level  3/10/2025 0820 by Doris Wesley RN  Outcome: Progressing  3/10/2025 0200 by Tong Argueta RN  Outcome: Progressing

## 2025-03-10 NOTE — PROGRESS NOTES
[facility]  Nephrology Progress Note  Patient's Name: Aggie Carter  4:09 PM  3/10/2025    Nephrologist: SASHA Mckeon MD    Reason for Consult:  CKD  Requesting Physician: Dr. LUIS Hummel    Chief Complaint:  Fluid overload and Afib    History of Present Ilness from the 2/17/25 note:    Aggie Carter is a 79 y.o. female with prior history of CKD G3B with a baseline serum cr 1.6mg/dl with an e-GFR=32ml/min presumed sec to microvascular disease from HTN, dyslipidemia. Pt was admitted on 2/19/25 for vol overload and Afib. She had an episode of Afib with RVR and she told ED MD she had a 21# wt gain. She told me her MD in [place] had once told her diuretics caused kidney problems and she cut back on the dose, but then her legs began to swell. She noted she had A[place].    Full <Consult deferred as pt just seen on 3/3/25: Addendum to the above: Pt was D/[place] on 3/3/25 to the [facility] and returned to the [facility] ED 3/5/25 with the cc SOB. She had been D/[place] on 3L O by NC and at the [facility] she was on 5L O2 by NC and had an O2sat 80% per the ED note.  In the ED the PCO2 113 and princess to a pCO2 130. She is on AVAPS this AM and last pCO2 97    INTERVAL HX:    3/7/25: Pt awake alert up in bed on NC at the time of my visit. She states she does feel SOB, but better than when she came in. She states she does not have her teeth and eating id difficult    3/8/2025: Patient seen and examined.  On AVAPS.  Making good urine output nearly 2 L.  Blood pressures are stable 120s to 140s over 60s to 70s.  Patient has persistently high CO2 though this and renal function both are fairly stable at baseline with creatinine around 1.3.  Patient has no acute complaints this morning.    3/9/2025: Patient seen and examined.  She continues to make good urine output.  She appears more awake, alert and interactive today.  Oxygen requirements have been lowered today.    3/10/25: No new complaints overnight.  Continues to make good amount of urine.  Renal function  AM    RDW 16.6 03/08/2025 02:41 AM    LYMPHOPCT 6 03/08/2025 02:41 AM    MONOPCT 4 03/08/2025 02:41 AM    EOSPCT 0 03/08/2025 02:41 AM    BASOPCT 0 03/08/2025 02:41 AM    MONOSABS 0.30 03/08/2025 02:41 AM    LYMPHSABS 0.43 03/08/2025 02:41 AM    EOSABS 0.00 03/08/2025 02:41 AM    BASOSABS 0.00 03/08/2025 02:41 AM     Hemoglobin/Hematocrit:    Lab Results   Component Value Date/Time    HGB 12.4 03/08/2025 02:41 AM    HCT 42.6 03/08/2025 02:41 AM     CMP:    Lab Results   Component Value Date/Time     03/10/2025 05:00 AM    K 3.9 03/10/2025 05:00 AM    K 4.1 04/14/2019 11:02 AM    CL 88 03/10/2025 05:00 AM    CO2 48 03/10/2025 05:00 AM    BUN 49 03/10/2025 05:00 AM    CREATININE 1.2 03/10/2025 05:00 AM    GFRAA >60 04/14/2019 11:02 AM    LABGLOM 44 03/10/2025 05:00 AM    GLUCOSE 98 03/10/2025 05:00 AM    CALCIUM 9.1 03/10/2025 05:00 AM    BILITOT 0.6 03/05/2025 05:03 AM    ALKPHOS 80 03/05/2025 05:03 AM    AST 15 03/05/2025 05:03 AM    ALT 13 03/05/2025 05:03 AM     BMP:    Lab Results   Component Value Date/Time     03/10/2025 05:00 AM    K 3.9 03/10/2025 05:00 AM    K 4.1 04/14/2019 11:02 AM    CL 88 03/10/2025 05:00 AM    CO2 48 03/10/2025 05:00 AM    BUN 49 03/10/2025 05:00 AM    CREATININE 1.2 03/10/2025 05:00 AM    CALCIUM 9.1 03/10/2025 05:00 AM    GFRAA >60 04/14/2019 11:02 AM    LABGLOM 44 03/10/2025 05:00 AM    GLUCOSE 98 03/10/2025 05:00 AM     BUN/Creatinine:    Lab Results   Component Value Date/Time    BUN 49 03/10/2025 05:00 AM    CREATININE 1.2 03/10/2025 05:00 AM     Hepatic Function Panel:    Lab Results   Component Value Date/Time    ALKPHOS 80 03/05/2025 05:03 AM    ALT 13 03/05/2025 05:03 AM    AST 15 03/05/2025 05:03 AM    BILITOT 0.6 03/05/2025 05:03 AM     Albumin:  No results found for: \"LABALBU\"  Calcium:    Lab Results   Component Value Date/Time    CALCIUM 9.1 03/10/2025 05:00 AM     Ionized Calcium:  No components found for: \"IONCA\"  Magnesium:    Lab Results   Component Value

## 2025-03-10 NOTE — PROGRESS NOTES
Occupational Therapy  OCCUPATIONAL THERAPY INITIAL EVALUATION  [facility]  [address], [place]    Date: 3/10/2025     Patient Name: Aggie Carter  MRN: [id number]  : 1945  Room: [address] [place] [address]    Evaluating OT: Quyen Bennett, OTR/L - [id number]    Referring Provider: Dwight Avendano MD   Specific Provider Orders/Date: \"OT eval and treat\" - 3/6/2025    Diagnosis: Respiratory syncytial virus (RSV) [B33.8]  Acute respiratory failure with hypoxia and hypercapnia (HCC) [J96.01, J96.02]  Acute heart failure, unspecified heart failure type (HCC) [I50.9]  Acute hypercapnic respiratory failure (HCC) [J96.02]  Acute congestive heart failure, unspecified heart failure type (HCC) [I50.9]      Pertinent Medical History: A fib, HLD, HTN, aortic stenosis     Precautions: fall risk, contact and droplet isolation    Assessment of Current Deficits:    [x] Functional mobility   [x]ADLs  [x] Strength               []Cognition   [x] Functional transfers   [x] IADLs         [x] Safety Awareness   [x]Endurance   [] Fine Coordination              [x] Balance      [] Vision/perception   []Sensation    []Gross Motor Coordination  [] ROM  [] Delirium                   [] Motor Control     OT PLAN OF CARE   OT POC is based on physician orders, patient diagnosis, and results of clinical assessment.  Frequency/Duration 2-5 days/week for 2 weeks PRN   Specific OT Treatment Interventions to Include:   * Instruction/training on adapted ADL techniques and AE recommendations to increase functional independence within precautions       * Training on energy conservation strategies, correct breathing pattern and techniques to improve independence/tolerance for self-care routine  * Functional transfer/mobility training/DME recommendations for increased independence, safety, and fall prevention  * Patient/Family education to increase follow through with safety techniques and  incontinent of bowel  MIN A   Bed Mobility  Supine-to-Sit: MOD A  Sit-to-Supine: MOD A   MIN A in order to maximize patient's independence with ADLs and functional tasks.   Functional Transfers Sit-to-Stand: unable to complete full stand, partial stand completed x 3    from EOB with MAX A  MIN A   Functional Mobility Unable to complete  MIN A with functional mobility (with device, as needed/appropriate) in order to maximize independence with ADLs and functional tasks.   Balance Sitting: SBA  Standing: unable to achieve full stand  MIN A dynamic standing balance in order to increase safety during self care and functional tasks   Activity Tolerance SOB with all activity, O2 monitored at 3L on room air 92% at rest and 83% with activity, returned to bed and recovered to 87% - notified nursing  Patient will demonstrate Good understanding of energy conservation techniques in order to increase functional independence   Visual/  Perceptual  WFL     N/A   B UE Strength 4-/5  Patient will demonstrate 4/5 B UE strength in order to maximize independence with ADLs and functional tasks.     Additional Long-Term Goal: Patient will increase functional independence to PLOF in order to allow patient to live in least restrictive environment.      ROM: Additional Information:    R UE  WFL WFL  and FMC/dexterity noted during ADL tasks   L UE WFL WFL  and FMC/dexterity noted during ADL tasks     Hearing: needs increased volume  Sensation: Patient did not report numbness/tingling   Tone: WFL  Edema: yes (BLE)    Comments: RN approved patient's participation in OOB activities. Upon arrival, patient supine. Patient was pleasant, agreed to participation in OT evaluation. Patient participated in bed mobility, seated tasks and attempted sit><stand with cues and assistance as needed. Patient was limited by SOB and desaturation. Patient was receptive to provided cues and education, all questions answered. At end of session, patient in

## 2025-03-10 NOTE — CONSULTS
[facility]   Inpatient CHF Nurse Navigator Consult      Cardiologist: Dr Marcial Carter is a 79 y.o. (1945) female with a history of HFpEF, most recent EF:  Lab Results   Component Value Date    LVEF 65 01/23/2019       Patient was awake and alert, laying in bed during the consultation and is agreeable to heart failure education. She was engaged and asked appropriate questions throughout the education session. She is from [facility] and will return there upon discharge. She was seen on consultation on February 21st of this year. She has no questions at this time.     Barriers identified during consult contributing to HF Hospitalization:  [] Limited medication adherence   [] Poor health literacy, education regarding HF medications provided   [] Pill box provided to patient  [] Difficulty affording medications  [] Difficulty obtaining/ managing medications  [] Prescription assistance information given     [] Not weighing themselves daily  [x] Weight log provided for easy monitoring  [] Scale provided     [] Not following low sodium diet  [] Food insecurity   [x] 2 gram sodium diet education provided   [] Low sodium recipes provided  [] Sodium free seasoning provided   [] Low sodium meal delivery options given to patient  [] Dietician consulted     [] Lack of transportation to appointments     [] Depression, given chronic illness  [] Primary team notified     [] Goals of care need addressed  [] Palliative care consulted     [] CHF CHW consulted, to assist with       Chart Reviewed:  Diet: ADULT DIET; Dysphagia - Minced and Moist; Low Fat/Low Chol/High Fiber/IVAN; 1800 ml; No Drinking Straws   Daily Weights: Patient Vitals for the past 96 hrs (Last 3 readings):   Weight   03/10/25 0009 136 kg (299 lb 13.2 oz)   03/08/25 0600 129.2 kg (284 lb 13.4 oz)   03/07/25 0058 (!) 138.2 kg (304 lb 10.8 oz)     I/O:   Intake/Output Summary (Last 24 hours) at 3/10/2025 1146  Last  gain of 3 pounds in one day or a total of 5 pounds or more in one week. Also, if you should have a significant weight loss of 3# or more in one day to call the doctor, they may need to decrease or hold the diuretic dose. On days you feels nauseated and not eating / drinking, having emesis or diarrhea,  informed to call the cardiologist  / doctor, they may need to decrease or hold diuretic to avoid dehydration.  Again, stressed the importance of informing their medical provider the first day of onset of any of the signs and symptoms in the \"Yellow Zone\" of the HF Zones.     The Heart Failure Booklet given to the patient with additional patient education addressing:  What is Heart Failure?  Things You Can Do to Live Well with HF  How to Take Your Medications  How to Eat Less Salt  [place] its Salt?  Exercising Well with Heart Failure  Signs and symptoms of HF to report  Weight Yourself Each Day  Home Self Management- activity, weight tracking, taking medications as prescribed, meals /diet planning (sodium and fluid restriction), how to read food labels, keeping physician follow ups, smoking cessation, follow the “Heart Failure Zones”  The Heart Failure zones  Every Dose Every Day    Instructed to call 911 if you have any of the following symptoms:  Struggling to breathe unrelieved with rest  Having chest pain  Confusion or can’t think clearly      Patient verbalizes understanding of above.   Greater than 30 minutes was spent educating patient.        Sonam Ortega RN   Heart Failure Navigator

## 2025-03-10 NOTE — PROGRESS NOTES
Physical Therapy  Facility/Department: [address] INTERMEDIATE 1  Physical Therapy Initial Assessment    Name: Aggie Carter  : 1945  MRN: [id number]  Date of Service: 3/10/2025    Patient Diagnosis(es): The primary encounter diagnosis was Acute respiratory failure with hypoxia and hypercapnia (HCC). Diagnoses of Acute congestive heart failure, unspecified heart failure type (HCC), Respiratory syncytial virus (RSV), and Acute heart failure, unspecified heart failure type (HCC) were also pertinent to this visit.  Past Medical History:  has a past medical history of Atrial fibrillation (HCC), Dyspnea on exertion, Family history of heart disease, Hyperlipidemia, Hypertension, and Mild aortic stenosis.  Past Surgical History:  has a past surgical history that includes Tubal ligation.      Evaluating Therapist: Criselda Gonzalez PT    Room #:  0436/0436-A  Diagnosis:  Respiratory syncytial virus (RSV) [B33.8]  Acute respiratory failure with hypoxia and hypercapnia (HCC) [J96.01, J96.02]  Acute heart failure, unspecified heart failure type (HCC) [I50.9]  Acute hypercapnic respiratory failure (HCC) [J96.02]  Acute congestive heart failure, unspecified heart failure type (HCC) [I50.9]  PMHx/PSHx:  Afib, HTN  Precautions:  falls, alarm, contact and droplet isolation, O2    Social:  Pt admitted from [facility] (was only at [facility] a few days ) prior lived alone in a one floor and ambulated with ww in home and cane in community.    Initial Evaluation  Date: 3/10/25 Treatment      Short Term/ Long Term   Goals   Was pt agreeable to Eval/treatment? yes     Does pt have pain? No c/o pain     Bed Mobility  Rolling: mod assist  Supine to sit: mod assist  Sit to supine: mod assist  Scooting: mod assist  Min assist   Transfers Sit to stand: max assist of 2  Stand to sit: max assist of 2  Stand pivot: NT  Mod assist   Ambulation    NT secondary to pt unable to fully stand at ww  10 feet with ww with mod assist   Stair Negotiation  Ascended and

## 2025-03-11 LAB
ALBUMIN SERPL-MCNC: 3.4 G/DL (ref 3.5–5.2)
ALP SERPL-CCNC: 68 U/L (ref 35–104)
ALT SERPL-CCNC: 38 U/L (ref 0–32)
ANION GAP SERPL CALCULATED.3IONS-SCNC: 5 MMOL/L (ref 7–16)
AST SERPL-CCNC: 31 U/L (ref 0–31)
BASOPHILS # BLD: 0.02 K/UL (ref 0–0.2)
BASOPHILS NFR BLD: 0 % (ref 0–2)
BILIRUB SERPL-MCNC: 0.6 MG/DL (ref 0–1.2)
BNP SERPL-MCNC: 2778 PG/ML (ref 0–450)
BUN SERPL-MCNC: 43 MG/DL (ref 6–23)
CALCIUM SERPL-MCNC: 8.9 MG/DL (ref 8.6–10.2)
CHLORIDE SERPL-SCNC: 86 MMOL/L (ref 98–107)
CO2 SERPL-SCNC: 48 MMOL/L (ref 22–29)
CREAT SERPL-MCNC: 1.3 MG/DL (ref 0.5–1)
EOSINOPHIL # BLD: 0 K/UL (ref 0.05–0.5)
EOSINOPHILS RELATIVE PERCENT: 0 % (ref 0–6)
ERYTHROCYTE [DISTWIDTH] IN BLOOD BY AUTOMATED COUNT: 15.9 % (ref 11.5–15)
GFR, ESTIMATED: 43 ML/MIN/1.73M2
GLUCOSE SERPL-MCNC: 117 MG/DL (ref 74–99)
HCT VFR BLD AUTO: 44.7 % (ref 34–48)
HGB BLD-MCNC: 12.9 G/DL (ref 11.5–15.5)
IMM GRANULOCYTES # BLD AUTO: 0.16 K/UL (ref 0–0.58)
IMM GRANULOCYTES NFR BLD: 2 % (ref 0–5)
LYMPHOCYTES NFR BLD: 0.44 K/UL (ref 1.5–4)
LYMPHOCYTES RELATIVE PERCENT: 4 % (ref 20–42)
MCH RBC QN AUTO: 26.4 PG (ref 26–35)
MCHC RBC AUTO-ENTMCNC: 28.9 G/DL (ref 32–34.5)
MCV RBC AUTO: 91.6 FL (ref 80–99.9)
MONOCYTES NFR BLD: 0.33 K/UL (ref 0.1–0.95)
MONOCYTES NFR BLD: 3 % (ref 2–12)
NEUTROPHILS NFR BLD: 91 % (ref 43–80)
NEUTS SEG NFR BLD: 9.16 K/UL (ref 1.8–7.3)
PLATELET # BLD AUTO: 153 K/UL (ref 130–450)
PMV BLD AUTO: 11.7 FL (ref 7–12)
POTASSIUM SERPL-SCNC: 4.1 MMOL/L (ref 3.5–5)
PROT SERPL-MCNC: 6.5 G/DL (ref 6.4–8.3)
RBC # BLD AUTO: 4.88 M/UL (ref 3.5–5.5)
RBC # BLD: ABNORMAL 10*6/UL
SODIUM SERPL-SCNC: 139 MMOL/L (ref 132–146)
WBC OTHER # BLD: 10.1 K/UL (ref 4.5–11.5)

## 2025-03-11 PROCEDURE — 97530 THERAPEUTIC ACTIVITIES: CPT

## 2025-03-11 PROCEDURE — 94640 AIRWAY INHALATION TREATMENT: CPT

## 2025-03-11 PROCEDURE — 6370000000 HC RX 637 (ALT 250 FOR IP): Performed by: INTERNAL MEDICINE

## 2025-03-11 PROCEDURE — 83880 ASSAY OF NATRIURETIC PEPTIDE: CPT

## 2025-03-11 PROCEDURE — 2700000000 HC OXYGEN THERAPY PER DAY

## 2025-03-11 PROCEDURE — 6360000002 HC RX W HCPCS: Performed by: NURSE PRACTITIONER

## 2025-03-11 PROCEDURE — 6370000000 HC RX 637 (ALT 250 FOR IP): Performed by: NURSE PRACTITIONER

## 2025-03-11 PROCEDURE — 2060000000 HC ICU INTERMEDIATE R&B

## 2025-03-11 PROCEDURE — 2500000003 HC RX 250 WO HCPCS: Performed by: NURSE PRACTITIONER

## 2025-03-11 PROCEDURE — 85025 COMPLETE CBC W/AUTO DIFF WBC: CPT

## 2025-03-11 PROCEDURE — 6360000002 HC RX W HCPCS: Performed by: INTERNAL MEDICINE

## 2025-03-11 PROCEDURE — 80053 COMPREHEN METABOLIC PANEL: CPT

## 2025-03-11 RX ORDER — ACETAZOLAMIDE 250 MG/1
250 TABLET ORAL DAILY
Status: DISCONTINUED | OUTPATIENT
Start: 2025-03-11 | End: 2025-03-12 | Stop reason: HOSPADM

## 2025-03-11 RX ORDER — PREDNISONE 20 MG/1
40 TABLET ORAL DAILY
Status: DISCONTINUED | OUTPATIENT
Start: 2025-03-12 | End: 2025-03-12 | Stop reason: HOSPADM

## 2025-03-11 RX ADMIN — MICONAZOLE NITRATE: 20 POWDER TOPICAL at 20:00

## 2025-03-11 RX ADMIN — ANORECTAL OINTMENT: 15.7; .44; 24; 20.6 OINTMENT TOPICAL at 08:07

## 2025-03-11 RX ADMIN — PETROLATUM: 420 OINTMENT TOPICAL at 20:00

## 2025-03-11 RX ADMIN — SODIUM CHLORIDE, PRESERVATIVE FREE 10 ML: 5 INJECTION INTRAVENOUS at 20:00

## 2025-03-11 RX ADMIN — FUROSEMIDE 40 MG: 40 TABLET ORAL at 08:07

## 2025-03-11 RX ADMIN — ACETAZOLAMIDE 250 MG: 250 TABLET ORAL at 15:21

## 2025-03-11 RX ADMIN — AMIODARONE HYDROCHLORIDE 200 MG: 200 TABLET ORAL at 08:07

## 2025-03-11 RX ADMIN — MONTELUKAST SODIUM 10 MG: 10 TABLET, COATED ORAL at 20:00

## 2025-03-11 RX ADMIN — FUROSEMIDE 40 MG: 40 TABLET ORAL at 17:00

## 2025-03-11 RX ADMIN — ANORECTAL OINTMENT: 15.7; .44; 24; 20.6 OINTMENT TOPICAL at 20:00

## 2025-03-11 RX ADMIN — METOPROLOL SUCCINATE 25 MG: 25 TABLET, EXTENDED RELEASE ORAL at 08:07

## 2025-03-11 RX ADMIN — METOPROLOL SUCCINATE 25 MG: 25 TABLET, EXTENDED RELEASE ORAL at 20:00

## 2025-03-11 RX ADMIN — LEVOTHYROXINE SODIUM 150 MCG: 75 TABLET ORAL at 06:25

## 2025-03-11 RX ADMIN — PETROLATUM: 420 OINTMENT TOPICAL at 08:08

## 2025-03-11 RX ADMIN — MICONAZOLE NITRATE: 20 POWDER TOPICAL at 08:07

## 2025-03-11 RX ADMIN — ARFORMOTEROL TARTRATE 15 MCG: 15 SOLUTION RESPIRATORY (INHALATION) at 07:35

## 2025-03-11 RX ADMIN — ARFORMOTEROL TARTRATE 15 MCG: 15 SOLUTION RESPIRATORY (INHALATION) at 21:15

## 2025-03-11 RX ADMIN — BUDESONIDE INHALATION 500 MCG: 0.5 SUSPENSION RESPIRATORY (INHALATION) at 21:15

## 2025-03-11 RX ADMIN — METHYLPREDNISOLONE SODIUM SUCCINATE 40 MG: 40 INJECTION INTRAMUSCULAR; INTRAVENOUS at 01:00

## 2025-03-11 RX ADMIN — RIVAROXABAN 15 MG: 15 TABLET, FILM COATED ORAL at 17:00

## 2025-03-11 RX ADMIN — SODIUM CHLORIDE, PRESERVATIVE FREE 10 ML: 5 INJECTION INTRAVENOUS at 08:07

## 2025-03-11 RX ADMIN — AMIODARONE HYDROCHLORIDE 200 MG: 200 TABLET ORAL at 20:00

## 2025-03-11 RX ADMIN — BUDESONIDE INHALATION 500 MCG: 0.5 SUSPENSION RESPIRATORY (INHALATION) at 07:35

## 2025-03-11 NOTE — PROGRESS NOTES
Message sent to Thomas HUFF CNP with pulm.  Regarding discharge and if bipap will be needed at discharge    Message sent to Holly Poole CNP with cardiology regarding the above

## 2025-03-11 NOTE — PROGRESS NOTES
[facility]                                Progress note    Subjective:  Denies chest pain and dyspnea  States that cough has lessened    Objective:  Sitting up in bed, conversing without difficulty  No acute distress  No family present at the bedside  /80   Pulse 70   Temp 97.4 °F (36.3 °C) (Axillary)   Resp 22   Ht 1.651 m (5' 5\")   Wt 136.1 kg (300 lb)   SpO2 97%   PF (!) 23 L/min   BMI 49.92 kg/m²   CONST:  Well developed, morbidly obese elderly  female  who appears stated age. Awake, alert, cooperative, no apparent distress  HEENT:   Head- Normocephalic, atraumatic   Eyes- Conjunctivae pink, anicteric  Throat- Oral mucosa pink and moist  Neck-  No stridor, trachea midline, no jugular venous distention. No adenopathy   CHEST: Chest symmetrical and non-tender to palpation. No accessory muscle use or intercostal retractions  RESPIRATORY: Lung sounds - clear throughout fields   CARDIOVASCULAR:     No carotid bruit  Heart Inspection- shows no noted pulsations  Heart Palpation- no heaves or thrills; PMI is non-displaced   Heart Ausculation- Regular rate and rhythm, no murmur. No s3, s4 or rub   PV: 1+ nonpitting bilateral lower extremity edema. No varicosities. Pedal pulses palpable, no clubbing or cyanosis   ABDOMEN: Soft, morbidly obese, non-tender to light palpation. Bowel sounds present. No palpable masses no organomegaly; no abdominal bruit  MS: Good muscle strength and tone. No atrophy or abnormal movements.   : Deferred  SKIN: Warm and dry no statis dermatitis or ulcers   NEURO / PSYCH: Oriented to person, place and time. Speech clear and appropriate. Follows all commands. Pleasant affect      Recent Labs     03/11/25  0225   WBC 10.1   HGB 12.9   HCT 44.7          Recent Labs     03/09/25  0500 03/10/25  0500 03/11/25  0225    142 139   K 3.8 3.9 4.1   CL 88* 88* 86*   CO2 47* 48* 48*   BUN 57* 49* 43*   CREATININE 1.3* 1.2* 1.3*   CALCIUM 9.1 9.1 8.9

## 2025-03-11 NOTE — PROGRESS NOTES
2 RN skin check completed with Kelly AARON . New area noted to left inner gluteal fold. ( See flow sheet).

## 2025-03-11 NOTE — PROGRESS NOTES
Value Date/Time    CALCIUM 8.9 03/11/2025 02:25 AM     Ionized Calcium:  No components found for: \"IONCA\"  Magnesium:    Lab Results   Component Value Date/Time    MG 2.0 03/10/2025 05:00 AM     Phosphorus:    Lab Results   Component Value Date/Time    PHOS 3.5 03/03/2025 06:00 AM     LDH:  No results found for: \"LDH\"  Uric Acid:    Lab Results   Component Value Date/Time    URICACID 10.3 02/28/2025 04:50 AM     PT/INR:    Lab Results   Component Value Date/Time    PROTIME 11.6 04/14/2019 11:02 AM    INR 1.0 04/14/2019 11:02 AM     PTT:    Lab Results   Component Value Date/Time    APTT 75.5 03/09/2025 05:06 AM    APTT 32.3 04/14/2019 11:02 AM   [APTT}  Troponin:  No results found for: \"TROPONINI\"  U/A:    Lab Results   Component Value Date/Time    COLORU Yellow 02/28/2025 03:00 PM    PROTEINU NEGATIVE 02/28/2025 03:00 PM    PHUR 6.0 02/28/2025 03:00 PM    LEUKOCYTESUR NEGATIVE 02/28/2025 03:00 PM    UROBILINOGEN 0.2 02/28/2025 03:00 PM    BILIRUBINUR NEGATIVE 02/28/2025 03:00 PM    GLUCOSEU NEGATIVE 02/28/2025 03:00 PM     ABG:    Lab Results   Component Value Date/Time    PH 7.376 03/09/2025 09:17 AM    PCO2 83.6 03/09/2025 09:17 AM    PO2 88.2 03/09/2025 09:17 AM    HCO3 47.9 03/09/2025 09:17 AM    BE 18.1 03/09/2025 09:17 AM    O2SAT 96.8 03/09/2025 09:17 AM     HgBA1c:  No results found for: \"LABA1C\"  Microalbumen/Creatinine ratio:  No components found for: \"RUCREAT\"  FLP:    Lab Results   Component Value Date/Time    TRIG 92 03/06/2025 04:48 AM    HDL 32 03/06/2025 04:48 AM     TSH:    Lab Results   Component Value Date/Time    TSH 1.23 03/06/2025 12:07 AM     VITAMIN B12: No components found for: \"B12\"  FOLATE:  No results found for: \"FOLATE\"  Iron Saturation:  No components found for: \"PERCENTFE\"  FERRITIN:    Lab Results   Component Value Date/Time    FERRITIN 612 03/06/2025 12:07 AM     LIPASE:  No results found for: \"LIPASE\"  Fibrinogen Level:  No components found for: \"FIB\"  24 Hour Urine for Protein:   Indeterminate diastolic function.  Left Atrium Left atrium is mildly dilated.  Right Ventricle Right ventricle is dilated. Low normal systolic function. TAPSE is 1.7 cm.  Right Atrium Right atrium is dilated.  Aortic Valve Fibrocalcific changes present. No regurgitation. Moderate stenosis. AV mean gradient is 14 mmHg. AV peak velocity is 2.3 m/s. AV Velocity Ratio is 0.30. LVOT:AV VTI Index is 0.32. LVOT diameter is 2.2 cm. AV area by continuity VTI is 1.2 cm2. AV area by peak velocity is 1.2 cm2.  Mitral Valve Trace regurgitation. No stenosis noted.  Tricuspid Valve Mild regurgitation. The estimated RVSP is 49 mmHg.  Pulmonic Valve The pulmonic valve was not well visualized.  Aorta Normal sized aortic root.  Pericardium No pericardial effusion.    XR CHEST PORTABLE [[id number]] Collected: 03/05/25 0539     Order Status: Completed Updated: 03/05/25 0544    Narrative:      EXAMINATION:  ONE XRAY VIEW OF THE CHEST    3/5/2025 5:20 am    COMPARISON:  02/27/2025    HISTORY:  ORDERING SYSTEM PROVIDED HISTORY: Shortness of Breath  TECHNOLOGIST PROVIDED HISTORY:  Reason for exam:->Shortness of Breath    FINDINGS:  There is cardiomegaly.  There are bilateral interstitial and airspace  opacities.  No pneumothorax or pleural effusion.    Impression:      Cardiomegaly with bilateral interstitial and airspace opacities. Findings  could be due to pulmonary edema or multifocal pneumonia.        Assessment  1-CKD G3B with a baseline serum cr 1.6mg/dl with an e-GFR=32ml/min presumed sec to microvascular disease from HTN, dyslipidemia  UA (-) blood/protein/Ni/LE  Urine protein/cr 0.11 not elevated  Last Cr 1.3 mg/dL  PLAN:  Follow Labs  Follow I/O's  Continue diuretics    2-Edema of the Bilat LE  2/28/25 US of the Bilat LE DVT (-)  PLAN:  Continue  furosemide 40mg  IV bid  CO2 elevated today.  Planned for acetazolamide 250 daily for 3 doses  Follow wt's    3-Hypokalemia -  Last Potassium 4.1 mmol/L  PLAN:  Follow K+ and Mg++ with

## 2025-03-11 NOTE — PROGRESS NOTES
Occupational Therapy  OT BEDSIDE TREATMENT NOTE      Date:3/11/2025  Patient Name: Aggie Carter  MRN: [id number]  : 1945  Room: [address] [place] [address]     Evaluating OT: Quyen Bennett OTR/L - [id number]     Referring Provider: Dwight Avendano MD   Specific Provider Orders/Date: \"OT eval and treat\" - 3/6/2025     Diagnosis: Respiratory syncytial virus (RSV) [B33.8]  Acute respiratory failure with hypoxia and hypercapnia (HCC) [J96.01, J96.02]  Acute heart failure, unspecified heart failure type (HCC) [I50.9]  Acute hypercapnic respiratory failure (HCC) [J96.02]  Acute congestive heart failure, unspecified heart failure type (HCC) [I50.9]       Pertinent Medical History: A fib, HLD, HTN, aortic stenosis      Precautions: fall risk, contact and droplet isolation     Assessment of Current Deficits:    [x] Functional mobility             [x]ADLs           [x] Strength                  []Cognition   [x] Functional transfers           [x] IADLs         [x] Safety Awareness   [x]Endurance   [] Fine Coordination              [x] Balance      [] Vision/perception   []Sensation     []Gross Motor Coordination  [] ROM           [] Delirium                   [] Motor Control      OT PLAN OF CARE   OT POC is based on physician orders, patient diagnosis, and results of clinical assessment.  Frequency/Duration 2-5 days/week for 2 weeks PRN   Specific OT Treatment Interventions to Include:   * Instruction/training on adapted ADL techniques and AE recommendations to increase functional independence within precautions       * Training on energy conservation strategies, correct breathing pattern and techniques to improve independence/tolerance for self-care routine  * Functional transfer/mobility training/DME recommendations for increased independence, safety, and fall prevention  * Patient/Family education to increase follow through with safety techniques and functional independence  * Recommendation of environmental modifications      Time In: 2:36  Time Out: 3:00     Min Units   Therapeutic Ex 62814     Therapeutic Activities 90605 24 2   ADL/Self Care 12012     Orthotic Management 82233     Neuro Re-Ed 11793     Non-Billable Time     TOTAL TIMED TREATMENT 24 2       Maddie SANTOYO/JOEL [id number]

## 2025-03-11 NOTE — PROGRESS NOTES
Rafa Ramires M.D.,[facility]  Sher Corbett D.O., AYAN., [facility]  Jessi Griffin M.D.  Ellen Madrid M.D.   Jose Luis Caballero D.O.  Petey Thibodeaux M.D.         Daily Pulmonary Progress Note    Patient:  Aggie Carter 79 y.o. female MRN: [id number]            Synopsis     We are following patient for recurrent resp acidosis on bipap, titrate bipap for snf    \"CC\" AMS, SOB    Code status: FULL        Subjective      Patient was seen and examined resting in bed on 3L O2 NC.  NAD on standby at the bedside.  She reports having a little bit of difficulty breathing earlier this morning but has since resolved.  No wheezing noted on exam.    Review of Systems:  Constitutional: Denies fever, weight loss, night sweats, and fatigue  Skin: Denies pigmentation, dark lesions, and rashes   HEENT: Denies hearing loss, tinnitus, ear drainage, epistaxis, sore throat, and hoarseness.  Cardiovascular: Denies palpitations, chest pain, and chest pressure.  Respiratory: moist cough, upper airway wheeze, hypoxia  Gastrointestinal: Denies nausea, vomiting, poor appetite, diarrhea, heartburn or reflux  Genitourinary: Denies dysuria, frequency, urgency or hematuria  Musculoskeletal: Denies myalgias, muscle weakness, and bone pain intermittent confusion  Neurological: Denies dizziness, vertigo, headache, and focal weakness  Psychological: Denies anxiety and depression  Endocrine: Denies heat intolerance and cold intolerance  Hematopoietic/Lymphatic: Denies bleeding problems and blood transfusions    24-hour events:  None     Objective   OBJECTIVE:   BP (!) 143/85   Pulse 84   Temp 97.7 °F (36.5 °C) (Axillary)   Resp 20   Ht 1.651 m (5' 5\")   Wt 136.1 kg (300 lb)   SpO2 98%   PF (!) 23 L/min   BMI 49.92 kg/m²   SpO2 Readings from Last 1 Encounters:   03/11/25 98%        I/O:    Intake/Output Summary (Last 24 hours) at 3/11/2025 1103  Last data filed at 3/11/2025 0826  Gross per 24 hour   Intake 180 ml   Output 2800 ml   Net  1.5 % 0.3   HHb 0.0 - 5.0 % 7.8 (H)   O2 Content mL/dL 18.4   Pt Temp C 37.0   Critical(s) Notified  Handed report to Dr/RN   Time Analyzed  1627   Mode  NC- 6 L   (HH): Data is critically high  (L): Data is abnormally low  (H): Data is abnormally high    Micro:  Component  Ref Range & Units (hover)    Specimen Description .NASOPHARYNGEAL SWAB   Adenovirus PCR Not Detected   Coronavirus 229E PCR Not Detected   Coronavirus HKU1 PCR Not Detected   Coronavirus NL63 PCR Not Detected   Coronavirus OC43 PCR Not Detected   SARS-CoV-2, PCR Not Detected   Human Metapneumovirus PCR Not Detected   Rhino/Enterovirus PCR Not Detected   Influenza A by PCR Not Detected   Influenza B by PCR Not Detected   Parainfluenza 1 PCR Not Detected   Parainfluenza 2 PCR Not Detected   Parainfluenza 3 PCR Not Detected   Parainfluenza 4 PCR Not Detected   Resp Syncytial Virus PCR DETECTED Abnormal    Bordetella parapertussis by PCR Not Detected   B Pertussis by PCR Not Detected   Chlamydia pneumoniae By PCR Not Detected   Mycoplasma pneumo by PCR Not Detected   Comment: Performed by multiplexed nucleic acid assay.   Resulting Agency [facility]             Specimen Collected: 03/05/25 05:03 EST Last Resulted: 03/05/25 11:29 EST        Assessment:    Acute on chronic respiratory failure with hypoxia and hypercapnia  Respiratory syncytial virus infection  History of mild asthma with exacerbation  Former smoker  Acute on chronic diastolic and right-sided heart failure, preserved EF 60% with moderate LVH dilated RV low normal function  Moderate AS  Likely MELISSA/OHS  New onset A-fib, CHUNG cardioversion successful 2/26/2025, return to A-fib  Fluid volume overload  Morbid obesity BMI 46.43  Obesity hypoventilation syndrome  CKD stage III  Physical deconditioning  Chronic lymphedema      Plan:   Wean oxygen as tolerated to maintain SpO2 greater than 92%, currently on 3 liters  ABGs reviewed. Baseline pCO2 appears to be around

## 2025-03-11 NOTE — CARE COORDINATION
Social Work/Discharge Planning:  [facility] with [facility] approval.  Patient pre-cert is good until 3/13.  Updated charge nurse.  Will continue to follow. Electronically signed by WINDY Moreno on 3/11/2025 at 8:46 AM

## 2025-03-11 NOTE — PLAN OF CARE
Problem: Safety - Adult  Goal: Free from fall injury  3/11/2025 0829 by Doris Wesley RN  Outcome: Progressing  3/11/2025 0433 by Ann Groves RN  Outcome: Progressing     Problem: Chronic Conditions and Co-morbidities  Goal: Patient's chronic conditions and co-morbidity symptoms are monitored and maintained or improved  3/11/2025 0829 by Doris Wesley RN  Outcome: Progressing  3/11/2025 0433 by Ann Groves, RN  Outcome: Progressing     Problem: Pain  Goal: Verbalizes/displays adequate comfort level or baseline comfort level  3/11/2025 0829 by Doris Wesley RN  Outcome: Progressing  3/11/2025 0433 by Ann Groves RN  Outcome: Progressing

## 2025-03-11 NOTE — PROGRESS NOTES
Physical Therapy  Facility/Department: [address] INTERMEDIATE 1  Physical Therapy Treatment Note    Name: Aggie Carter  : 1945  MRN: [id number]  Date of Service: 3/11/2025    Patient Diagnosis(es): The primary encounter diagnosis was Acute respiratory failure with hypoxia and hypercapnia (HCC). Diagnoses of Acute congestive heart failure, unspecified heart failure type (HCC), Respiratory syncytial virus (RSV), and Acute heart failure, unspecified heart failure type (HCC) were also pertinent to this visit.  Past Medical History:  has a past medical history of Atrial fibrillation (HCC), Dyspnea on exertion, Family history of heart disease, Hyperlipidemia, Hypertension, and Mild aortic stenosis.  Past Surgical History:  has a past surgical history that includes Tubal ligation.      Evaluating Therapist: Criselda Gonzalez PT    Room #:  0436/0436-A  Diagnosis:  Respiratory syncytial virus (RSV) [B33.8]  Acute respiratory failure with hypoxia and hypercapnia (HCC) [J96.01, J96.02]  Acute heart failure, unspecified heart failure type (HCC) [I50.9]  Acute hypercapnic respiratory failure (HCC) [J96.02]  Acute congestive heart failure, unspecified heart failure type (HCC) [I50.9]  PMHx/PSHx:  Afib, HTN  Precautions:  falls, alarm, contact and droplet isolation, O2    Social:  Pt admitted from [facility] (was only at [facility] a few days ) prior lived alone in a one floor and ambulated with ww in home and cane in community.    Initial Evaluation  Date: 3/10/25 Treatment  3/11/2025    Short Term/ Long Term   Goals   Was pt agreeable to Eval/treatment? yes yes    Does pt have pain? No c/o pain No complaints    Bed Mobility  Rolling: mod assist  Supine to sit: mod assist  Sit to supine: mod assist  Scooting: mod assist Rolling: Mod A  Supine to sit: Mod A  Sit to supine: Max A LE support  Scooting: Mod A seated to EOB Min assist   Transfers Sit to stand: max assist of 2  Stand to sit: max assist of 2  Stand pivot: NT Sit <> stand: Mod A of 2

## 2025-03-11 NOTE — CARE COORDINATION
Social Work/Discharge Planning;  Received notification patient will need a bipap at discharge and order noted.  Notified Nancy with [facility].  Will continue to follow.  Electronically signed by WINDY Moreno on 3/11/2025 at 10:41 AM

## 2025-03-12 VITALS
DIASTOLIC BLOOD PRESSURE: 71 MMHG | SYSTOLIC BLOOD PRESSURE: 103 MMHG | BODY MASS INDEX: 48.82 KG/M2 | HEIGHT: 65 IN | RESPIRATION RATE: 22 BRPM | HEART RATE: 72 BPM | WEIGHT: 293 LBS | OXYGEN SATURATION: 97 % | TEMPERATURE: 97.5 F

## 2025-03-12 LAB
ALBUMIN SERPL-MCNC: 3.4 G/DL (ref 3.5–5.2)
ALP SERPL-CCNC: 66 U/L (ref 35–104)
ALT SERPL-CCNC: 50 U/L (ref 0–32)
ANION GAP SERPL CALCULATED.3IONS-SCNC: 4 MMOL/L (ref 7–16)
AST SERPL-CCNC: 37 U/L (ref 0–31)
BASOPHILS # BLD: 0.02 K/UL (ref 0–0.2)
BASOPHILS NFR BLD: 0 % (ref 0–2)
BILIRUB SERPL-MCNC: 0.7 MG/DL (ref 0–1.2)
BUN SERPL-MCNC: 44 MG/DL (ref 6–23)
CALCIUM SERPL-MCNC: 9.4 MG/DL (ref 8.6–10.2)
CHLORIDE SERPL-SCNC: 87 MMOL/L (ref 98–107)
CO2 SERPL-SCNC: 50 MMOL/L (ref 22–29)
CREAT SERPL-MCNC: 1.5 MG/DL (ref 0.5–1)
EOSINOPHIL # BLD: 0.07 K/UL (ref 0.05–0.5)
EOSINOPHILS RELATIVE PERCENT: 1 % (ref 0–6)
ERYTHROCYTE [DISTWIDTH] IN BLOOD BY AUTOMATED COUNT: 16.1 % (ref 11.5–15)
GFR, ESTIMATED: 35 ML/MIN/1.73M2
GLUCOSE SERPL-MCNC: 94 MG/DL (ref 74–99)
HCT VFR BLD AUTO: 45.7 % (ref 34–48)
HGB BLD-MCNC: 13.3 G/DL (ref 11.5–15.5)
IMM GRANULOCYTES # BLD AUTO: 0.13 K/UL (ref 0–0.58)
IMM GRANULOCYTES NFR BLD: 1 % (ref 0–5)
LYMPHOCYTES NFR BLD: 1 K/UL (ref 1.5–4)
LYMPHOCYTES RELATIVE PERCENT: 10 % (ref 20–42)
MCH RBC QN AUTO: 26.8 PG (ref 26–35)
MCHC RBC AUTO-ENTMCNC: 29.1 G/DL (ref 32–34.5)
MCV RBC AUTO: 92.1 FL (ref 80–99.9)
MONOCYTES NFR BLD: 0.89 K/UL (ref 0.1–0.95)
MONOCYTES NFR BLD: 9 % (ref 2–12)
NEUTROPHILS NFR BLD: 79 % (ref 43–80)
NEUTS SEG NFR BLD: 7.94 K/UL (ref 1.8–7.3)
PLATELET # BLD AUTO: 165 K/UL (ref 130–450)
PMV BLD AUTO: 11.6 FL (ref 7–12)
POTASSIUM SERPL-SCNC: 4.7 MMOL/L (ref 3.5–5)
PROT SERPL-MCNC: 6.3 G/DL (ref 6.4–8.3)
RBC # BLD AUTO: 4.96 M/UL (ref 3.5–5.5)
SODIUM SERPL-SCNC: 141 MMOL/L (ref 132–146)
WBC OTHER # BLD: 10.1 K/UL (ref 4.5–11.5)

## 2025-03-12 PROCEDURE — 6370000000 HC RX 637 (ALT 250 FOR IP)

## 2025-03-12 PROCEDURE — 85025 COMPLETE CBC W/AUTO DIFF WBC: CPT

## 2025-03-12 PROCEDURE — 92526 ORAL FUNCTION THERAPY: CPT

## 2025-03-12 PROCEDURE — 6370000000 HC RX 637 (ALT 250 FOR IP): Performed by: NURSE PRACTITIONER

## 2025-03-12 PROCEDURE — 6370000000 HC RX 637 (ALT 250 FOR IP): Performed by: INTERNAL MEDICINE

## 2025-03-12 PROCEDURE — 80053 COMPREHEN METABOLIC PANEL: CPT

## 2025-03-12 PROCEDURE — 2500000003 HC RX 250 WO HCPCS: Performed by: NURSE PRACTITIONER

## 2025-03-12 PROCEDURE — 6360000002 HC RX W HCPCS: Performed by: NURSE PRACTITIONER

## 2025-03-12 PROCEDURE — 94660 CPAP INITIATION&MGMT: CPT

## 2025-03-12 PROCEDURE — 94640 AIRWAY INHALATION TREATMENT: CPT

## 2025-03-12 PROCEDURE — 2700000000 HC OXYGEN THERAPY PER DAY

## 2025-03-12 RX ORDER — ACETAZOLAMIDE 250 MG/1
250 TABLET ORAL DAILY
Qty: 1 TABLET | Refills: 0 | Status: SHIPPED | OUTPATIENT
Start: 2025-03-13 | End: 2025-03-14

## 2025-03-12 RX ORDER — ARFORMOTEROL TARTRATE 15 UG/2ML
15 SOLUTION RESPIRATORY (INHALATION)
Qty: 120 ML | Refills: 3 | Status: SHIPPED | OUTPATIENT
Start: 2025-03-12

## 2025-03-12 RX ORDER — BUDESONIDE 0.5 MG/2ML
500 INHALANT ORAL
Qty: 60 EACH | Refills: 3 | Status: SHIPPED | OUTPATIENT
Start: 2025-03-12

## 2025-03-12 RX ORDER — PREDNISONE 20 MG/1
40 TABLET ORAL DAILY
Qty: 8 TABLET | Refills: 0 | Status: SHIPPED | OUTPATIENT
Start: 2025-03-13 | End: 2025-03-17

## 2025-03-12 RX ORDER — IPRATROPIUM BROMIDE AND ALBUTEROL SULFATE 2.5; .5 MG/3ML; MG/3ML
3 SOLUTION RESPIRATORY (INHALATION) EVERY 4 HOURS PRN
Qty: 360 ML | Refills: 0 | Status: SHIPPED | OUTPATIENT
Start: 2025-03-12

## 2025-03-12 RX ADMIN — PETROLATUM: 420 OINTMENT TOPICAL at 09:02

## 2025-03-12 RX ADMIN — BUDESONIDE INHALATION 500 MCG: 0.5 SUSPENSION RESPIRATORY (INHALATION) at 07:30

## 2025-03-12 RX ADMIN — RIVAROXABAN 15 MG: 15 TABLET, FILM COATED ORAL at 17:42

## 2025-03-12 RX ADMIN — ANORECTAL OINTMENT: 15.7; .44; 24; 20.6 OINTMENT TOPICAL at 09:02

## 2025-03-12 RX ADMIN — MICONAZOLE NITRATE: 20 POWDER TOPICAL at 09:01

## 2025-03-12 RX ADMIN — FUROSEMIDE 40 MG: 40 TABLET ORAL at 17:42

## 2025-03-12 RX ADMIN — METOPROLOL SUCCINATE 25 MG: 25 TABLET, EXTENDED RELEASE ORAL at 09:02

## 2025-03-12 RX ADMIN — AMIODARONE HYDROCHLORIDE 200 MG: 200 TABLET ORAL at 09:02

## 2025-03-12 RX ADMIN — ACETAZOLAMIDE 250 MG: 250 TABLET ORAL at 09:02

## 2025-03-12 RX ADMIN — ARFORMOTEROL TARTRATE 15 MCG: 15 SOLUTION RESPIRATORY (INHALATION) at 07:30

## 2025-03-12 RX ADMIN — LEVOTHYROXINE SODIUM 150 MCG: 75 TABLET ORAL at 06:05

## 2025-03-12 RX ADMIN — PREDNISONE 40 MG: 20 TABLET ORAL at 09:01

## 2025-03-12 RX ADMIN — FUROSEMIDE 40 MG: 40 TABLET ORAL at 09:02

## 2025-03-12 RX ADMIN — SODIUM CHLORIDE, PRESERVATIVE FREE 10 ML: 5 INJECTION INTRAVENOUS at 09:01

## 2025-03-12 NOTE — DISCHARGE SUMMARY
[facility]   Discharge summary   Patient ID:  Aggie Carter  [id number]  79 y.o.  1945    Admit date: 3/5/2025    Discharge date and time: 3/12/2025    Admission Diagnoses:   Patient Active Problem List   Diagnosis    Exertional dyspnea    Leg edema    DM (diabetes mellitus) (HCC)    HTN (hypertension)    HLD (hyperlipidemia)    Hypothyroidism    Nonrheumatic aortic valve stenosis    New onset atrial fibrillation (HCC)    Benign hypertensive kidney disease    Bilateral nonexudative age-related macular degeneration    Body mass index (BMI) 40.0-44.9, adult    Herpes zoster without complication    Ptosis of eyelid    Pure hypercholesterolemia    Seborrheic dermatitis of scalp    Stage 3b chronic kidney disease (HCC)    Acute heart failure, unspecified heart failure type (HCC)    Acute hypercapnic respiratory failure (HCC)    RSV (acute bronchiolitis due to respiratory syncytial virus)       Discharge Diagnoses:   Patient Active Problem List   Diagnosis    Exertional dyspnea    Leg edema    DM (diabetes mellitus) (HCC)    HTN (hypertension)    HLD (hyperlipidemia)    Hypothyroidism    Nonrheumatic aortic valve stenosis    New onset atrial fibrillation (HCC)    Benign hypertensive kidney disease    Bilateral nonexudative age-related macular degeneration    Body mass index (BMI) 40.0-44.9, adult    Herpes zoster without complication    Ptosis of eyelid    Pure hypercholesterolemia    Seborrheic dermatitis of scalp    Stage 3b chronic kidney disease (HCC)    Acute heart failure, unspecified heart failure type (HCC)    Acute hypercapnic respiratory failure (HCC)    RSV (acute bronchiolitis due to respiratory syncytial virus)       Consults: Nephrology, Pulmonology    Procedures: None    Hospital Course: The patient is a 79 y.o. female of Fuentes Herrera MD with significant past medical history of HLD, HTN, aortic stenosis, Atrial fib who presents with worsening SOB.     Just discharged 2 days ago for  fields   CARDIOVASCULAR:     No carotid bruit  Heart Inspection- shows no noted pulsations  Heart Palpation- no heaves or thrills; PMI is non-displaced   Heart Ausculation- Regular rate and rhythm, no murmur. No s3, s4 or rub   PV: 1+ nonpitting bilateral lower extremity edema. No varicosities. Pedal pulses palpable, no clubbing or cyanosis   ABDOMEN: Soft, morbidly obese, non-tender to light palpation. Bowel sounds present. No palpable masses no organomegaly; no abdominal bruit  MS: Good muscle strength and tone. No atrophy or abnormal movements.   : Deferred  SKIN: Warm and dry no statis dermatitis or ulcers   NEURO / PSYCH: Oriented to person, place and time. Speech clear and appropriate. Follows all commands. Pleasant affect     Disposition: [facility]     Patient Condition at Discharge: Stable    Patient Instructions:      Medication List        START taking these medications      acetaZOLAMIDE 250 MG tablet  Commonly known as: DIAMOX  Take 1 tablet by mouth daily for 1 dose  Start taking on: March 13, 2025     arformoterol tartrate 15 MCG/2ML Nebu  Commonly known as: BROVANA  Take 2 mLs by nebulization in the morning and 2 mLs in the evening.     budesonide 0.5 MG/2ML nebulizer suspension  Commonly known as: PULMICORT  Take 2 mLs by nebulization in the morning and 2 mLs in the evening.     ipratropium 0.5 mg-albuterol 2.5 mg 0.5-2.5 (3) MG/3ML Soln nebulizer solution  Commonly known as: DUONEB  Inhale 3 mLs into the lungs every 4 hours as needed for Shortness of Breath or Wheezing     menthol-zinc oxide 0.44-20.6 % Oint ointment  Commonly known as: CALMOSEPTINE  Apply topically in the morning and at bedtime for 7 days Max 30 ml per day.     miconazole 2 % powder  Commonly known as: MICOTIN  Apply topically 2 times daily.     predniSONE 20 MG tablet  Commonly known as: DELTASONE  Take 2 tablets by mouth daily for 4 doses  Start taking on: March 13, 2025     white petrolatum Oint ointment  Apply topically in the

## 2025-03-12 NOTE — CARE COORDINATION
Social Work/Discharge Planning:  Received notification patient to discharge to [facility] today.  Updated Nancy with [facility] and confirmed facility has bipap for patient.  Will continue to follow.  Electronically signed by WINDY Moreno on 3/12/2025 at 11:48 AM

## 2025-03-12 NOTE — CARE COORDINATION
Social Work/Discharge Planning:  Discharge order noted.  Patient to return to [facility].  Unit arranged ambulance transport for 4:00pm.  Notified Lucy and lenny Cruz at [facility] of discharge time.  Electronically signed by WINDY Moreno on 3/12/2025 at 1:22 PM

## 2025-03-12 NOTE — PROGRESS NOTES
Comprehensive Nutrition Assessment    Type and Reason for Visit:  Initial, LOS    Nutrition Recommendations/Plan:   Continue current diet  Will add Expedite Cup daily to promote wound healing  Continue inpatient monitoring      Malnutrition Assessment:  Malnutrition Status:  At risk for malnutrition (r/t wounds) (03/12/25 1114)    Context:  Acute Illness     Findings of the 6 clinical characteristics of malnutrition:  Energy Intake:  No decrease in energy intake  Weight Loss:  Unable to assess (fluid shifts)     Body Fat Loss:  Unable to assess (droplet iso)     Muscle Mass Loss:  Unable to assess (droplet iso, edema may mask losses)    Fluid Accumulation:  Unable to assess (multifactorial)     Strength:  Not Performed    Nutrition Assessment:    Pt admits w/ acute on chronic resp failure, RSV, acute on chronic HFpEF. Hx DM, CKD, lymphedema. Dysphagia diet s/p MBSS 3/7. Will add wound healing ONS.    Nutrition Related Findings:    A&O X4, -11.6L, +2-3 edema, abd obese, +BS   Wound Type: Wound Consult Pending, Pressure Injury (Per LDA; Right labia wound - likely from purewick. Buttocks/coccyx red/purple per pending wound consult)       Current Nutrition Intake & Therapies:    Average Meal Intake: %  Average Supplements Intake: None Ordered  ADULT DIET; Dysphagia - Minced and Moist; Low Fat/Low Chol/High Fiber/IVAN; 1800 ml; No Drinking Straws    Anthropometric Measures:  Height: 165.1 cm (5' 5\")  Ideal Body Weight (IBW): 125 lbs (57 kg)    Admission Body Weight: 138.2 kg (304 lb 10.8 oz) (3/7 bed)  Current Body Weight: 136 kg (299 lb 14.4 oz) (3/12), 239.9 % IBW. Weight Source: Bed scale  Current BMI (kg/m2): 49.9  Usual Body Weight: 130.4 kg (287 lb 8 oz) (2/24/25 per EMR)     % Weight Change (Calculated): 4.3                    BMI Categories: Obese Class 3 (BMI 40.0 or greater)    Estimated Daily Nutrient Needs:  Energy Requirements Based On: Kcal/kg  Weight Used for Energy Requirements: Current  Energy

## 2025-03-12 NOTE — PROGRESS NOTES
Or  potassium bicarb-citric acid (EFFER-K) effervescent tablet 40 mEq, PRN   Or  potassium chloride 10 mEq/100 mL IVPB (Peripheral Line), PRN  magnesium sulfate 2000 mg in 50 mL IVPB premix, PRN  ipratropium 0.5 mg-albuterol 2.5 mg (DUONEB) nebulizer solution 1 Dose, Q4H PRN  miconazole (MICOTIN) 2 % powder, BID  menthol-zinc oxide (CALMOSEPTINE) 0.44-20.6 % ointment, BID  white petrolatum ointment, BID        Review of Systems:   Pertinent items are noted in HPI.    Physical exam:   Constitutional:  Elderly female in NAD more interactive with me today  Vitals: VITALS:  /88   Pulse 88   Temp 96.8 °F (36 °C) (Axillary)   Resp 22   Ht 1.651 m (5' 5\")   Wt 136 kg (299 lb 14.4 oz)   SpO2 91%   PF (!) 23 L/min   BMI 49.91 kg/m²   24HR INTAKE/OUTPUT:    Intake/Output Summary (Last 24 hours) at 3/12/2025 1002  Last data filed at 3/11/2025 2000  Gross per 24 hour   Intake 240 ml   Output 2200 ml   Net -1960 ml     URINARY CATHETER OUTPUT ([facility]):  External Urinary Catheter-Output (mL): 1500 mL  DRAIN/TUBE OUTPUT:     VENT SETTINGS:     Additional Respiratory Assessments  Pulse: 88  Respirations: 22  SpO2: 91 %  ETCO2 (mmHg): 1.43 mmHg  Skin: no rash, turgor wnl  Heent:  eomi, mmm  Neck: no bruits or jvd noted  Cardiovascular: Irreg Irreg without S3 and with 2/6 murmur over the Ao area  Respiratory: Diminished to bases, wheezes  Abdomen:  +bs, soft, nt, nd  Ext: 2+ bilat  lower extremity edema and 1+ bilat UE edema  Psychiatric: mood and affect appropriate    Data:   Labs:  CBC:   Lab Results   Component Value Date/Time    WBC 10.1 03/12/2025 03:23 AM    RBC 4.96 03/12/2025 03:23 AM    HGB 13.3 03/12/2025 03:23 AM    HCT 45.7 03/12/2025 03:23 AM    MCV 92.1 03/12/2025 03:23 AM    MCH 26.8 03/12/2025 03:23 AM    MCHC 29.1 03/12/2025 03:23 AM    RDW 16.1 03/12/2025 03:23 AM     03/12/2025 03:23 AM    MPV 11.6 03/12/2025 03:23 AM     CBC with Differential:    Lab Results   Component Value Date/Time    WBC  daily for 3 doses  Follow wt's    3-Hypokalemia -  Last Potassium 4.7 mmol/L  PLAN:  Follow K+ and Mg++ with the increase in the furosemide    4-Acute on Chronic Hypercarbic Resp Failure with an Acute on Chronic Resp Acidosis with a compensatory Metabolic Alkalosis   Last bicarbonate level 50 mmol/L, Chloride 87 mmol/dL, AG 4 mmol/L today with an albumin on 3.4 g/dL.   Would treat for alkalemia  PLAN:  Recommend ABG if bicarb is elevated  See above    5- HTN with CKD I-IV  BP goal <130/80(ACC/AHA Target)-BP at/near goal  PLAN:  Follow BP       Micheal Pereira MD  10:02 AM  3/12/2025

## 2025-03-12 NOTE — PROGRESS NOTES
SPEECH LANGUAGE PATHOLOGY  DAILY PROGRESS NOTE      PATIENT NAME:  Aggie Carter      :  1945          TODAY'S DATE:  3/12/2025 ROOM:  [address] [place] [address]-A    Current Diet: ADULT DIET; Dysphagia - Minced and Moist; Low Fat/Low Chol/High Fiber/IVAN; 1800 ml; No Drinking Straws  ADULT ORAL NUTRITION SUPPLEMENT; Lunch; Other Oral Supplement; 2 oz Expedite Cup    Patient seen for ongoing dysphagia tx during lunch meal. Patient seated upright in bed and feeding self. Patient with nonproductive cough at baseline. Patient with minimal oral residuals that cleared with additional swallow. Occasional nonproductive cough that did not increase with intake.   It should be noted, silent aspiration can not be ruled out at the bedside, and if silent aspiration is suspected based on clinical correlation an MBSS would be recommended.     Recommendation: cont current diet       CPT code(s) 14101  dysphagia tx  Total minutes :  15 minutes    Naina Mckeon M.S. CCC-SLP/L  Speech Language Pathologist  [id number]

## 2025-03-12 NOTE — PROGRESS NOTES
Rafa Ramires M.D.,[facility]  Sher Corbett D.O., AYAN., [facility]  Jessi Griffin M.D.  Ellen Madrid M.D.   Jose Luis Caballero D.O.  Petey Thibodeaux M.D.         Daily Pulmonary Progress Note    Patient:  Aggie Carter 79 y.o. female MRN: [id number]            Synopsis     We are following patient for recurrent resp acidosis on bipap, titrate bipap for snf    \"CC\" AMS, SOB    Code status: FULL        Subjective      Patient was seen and examined resting in bed on 3L O2 NC.  She is comfortable on her nasal cannula, no distress noted.    Review of Systems:  Constitutional: Denies fever, weight loss, night sweats, and fatigue  Skin: Denies pigmentation, dark lesions, and rashes   HEENT: Denies hearing loss, tinnitus, ear drainage, epistaxis, sore throat, and hoarseness.  Cardiovascular: Denies palpitations, chest pain, and chest pressure.  Respiratory: moist cough, upper airway wheeze, hypoxia  Gastrointestinal: Denies nausea, vomiting, poor appetite, diarrhea, heartburn or reflux  Genitourinary: Denies dysuria, frequency, urgency or hematuria  Musculoskeletal: Denies myalgias, muscle weakness, and bone pain intermittent confusion  Neurological: Denies dizziness, vertigo, headache, and focal weakness  Psychological: Denies anxiety and depression  Endocrine: Denies heat intolerance and cold intolerance  Hematopoietic/Lymphatic: Denies bleeding problems and blood transfusions    24-hour events:  None     Objective   OBJECTIVE:   /67   Pulse 67   Temp 97.9 °F (36.6 °C) (Oral)   Resp 22   Ht 1.651 m (5' 5\")   Wt 136 kg (299 lb 14.4 oz)   SpO2 92%   PF (!) 23 L/min   BMI 49.91 kg/m²   SpO2 Readings from Last 1 Encounters:   03/12/25 92%        I/O:    Intake/Output Summary (Last 24 hours) at 3/12/2025 1228  Last data filed at 3/12/2025 1031  Gross per 24 hour   Intake 300 ml   Output 2200 ml   Net -1900 ml                CPAP/EPAP: 8 cmH2O     CURRENT MEDS :  Scheduled Meds:   predniSONE  40 mg Oral

## 2025-03-12 NOTE — FLOWSHEET NOTE
Inpatient Wound Care (initial consult) 436    Admit Date: 3/5/2025  4:50 AM    Reason for consult:  right labia, buttocks, coccyx    Patient laying down in bed, awake, alert and oriented. Assist of two people to roll.     Significant history:  per H&P    CHIEF COMPLAINT:  SOB     Reason for Admission:  respiratory acidosis     HISTORY OF PRESENT ILLNESS:       The patient is a 79 y.o. female of Fuentes Herrera MD with significant past medical history of HLD, HTN, aortic stenosis, Atrial fib who presents with worsening SOB.    Past Medical History:   Diagnosis Date    Atrial fibrillation (HCC)     Dyspnea on exertion     Family history of heart disease     Hyperlipidemia     Hypertension     Mild aortic stenosis 05/10/2013     Findings:     03/12/25 1150   Skin Integrity Site 2   Skin Integrity Location 2 Other (comment)  (intact blanching , dry flaky)   Location 2 both heels   Skin Integrity Site 3   Skin Integrity Location 3 Redness;Other (comment)  (intact with blanching)    Location 3 bilateral buttocks   Skin Integrity Site 4   Skin Integrity Location 4 Redness;Rash   Location 4 groins   Skin Integrity Site 5   Skin Integrity Location 5 Redness;Erosion/denuded   Location 5 abdomen folds, breast folds   Wound 03/05/25 Thigh Right;Inner   Date First Assessed/Time First Assessed: 03/05/25 1922   Present on Original Admission: Yes  Primary Wound Type: (c) Other (comment)  Location: Thigh  Wound Location Orientation: Right;Inner   Wound Image    Wound Etiology Other  (erosion, ecchymosis from purewick)   Wound Cleansed Soap and water   Dressing/Treatment Other (comment);Pharmaceutical agent (see MAR)  (aquaphor)   Wound Length (cm) 2.5 cm   Wound Width (cm) 1.5 cm   Wound Depth (cm)   (unable to determine)   Wound Surface Area (cm^2) 3.75 cm^2   Change in Wound Size % (l*w) 37.5   Wound Assessment Purple/maroon;Other (Comment)  (ecchymosis)   Drainage Amount None (dry)   Ilsa-wound Assessment Blanchable erythema

## 2025-03-12 NOTE — PLAN OF CARE
Problem: Chronic Conditions and Co-morbidities  Goal: Patient's chronic conditions and co-morbidity symptoms are monitored and maintained or improved  3/12/2025 1019 by Nehal Chang RN  Outcome: Progressing     Problem: Pain  Goal: Verbalizes/displays adequate comfort level or baseline comfort level  3/12/2025 1019 by Nehal Chang RN  Outcome: Progressing     Problem: Safety - Adult  Goal: Free from fall injury  Recent Flowsheet Documentation  Taken 3/12/2025 0900 by Nehal Chang RN  Free From Fall Injury: Instruct family/caregiver on patient safety     Problem: Safety - Adult  Goal: Free from fall injury  3/12/2025 1019 by Nehal Chang RN  Outcome: Progressing  Flowsheets (Taken 3/12/2025 0900)  Free From Fall Injury: Instruct family/caregiver on patient safety

## 2025-03-17 ENCOUNTER — APPOINTMENT (OUTPATIENT)
Dept: GENERAL RADIOLOGY | Age: 80
End: 2025-03-17
Payer: MEDICARE

## 2025-03-17 ENCOUNTER — HOSPITAL ENCOUNTER (EMERGENCY)
Age: 80
Discharge: HOME | End: 2025-03-18
Attending: STUDENT IN AN ORGANIZED HEALTH CARE EDUCATION/TRAINING PROGRAM
Payer: MEDICARE

## 2025-03-17 DIAGNOSIS — R06.02 SHORTNESS OF BREATH: Primary | ICD-10-CM

## 2025-03-17 DIAGNOSIS — J96.12 CHRONIC HYPERCAPNIC RESPIRATORY FAILURE: ICD-10-CM

## 2025-03-17 LAB
ALBUMIN SERPL-MCNC: 3.4 G/DL (ref 3.5–5.2)
ALP SERPL-CCNC: 85 U/L (ref 35–104)
ALT SERPL-CCNC: 35 U/L (ref 0–32)
ANION GAP SERPL CALCULATED.3IONS-SCNC: 2 MMOL/L (ref 7–16)
AST SERPL-CCNC: 18 U/L (ref 0–31)
B.E.: 21.3 MMOL/L (ref -3–3)
BASOPHILS # BLD: 0.02 K/UL (ref 0–0.2)
BASOPHILS NFR BLD: 0 % (ref 0–2)
BILIRUB SERPL-MCNC: 0.4 MG/DL (ref 0–1.2)
BNP SERPL-MCNC: 1785 PG/ML (ref 0–450)
BUN SERPL-MCNC: 40 MG/DL (ref 6–23)
CALCIUM SERPL-MCNC: 9.2 MG/DL (ref 8.6–10.2)
CHLORIDE SERPL-SCNC: 91 MMOL/L (ref 98–107)
CO2 SERPL-SCNC: 49 MMOL/L (ref 22–29)
COHB: 0.8 % (ref 0–1.5)
CREAT SERPL-MCNC: 1.4 MG/DL (ref 0.5–1)
CRITICAL: ABNORMAL
DATE ANALYZED: ABNORMAL
DATE OF COLLECTION: ABNORMAL
EOSINOPHIL # BLD: 0.1 K/UL (ref 0.05–0.5)
EOSINOPHILS RELATIVE PERCENT: 1 % (ref 0–6)
ERYTHROCYTE [DISTWIDTH] IN BLOOD BY AUTOMATED COUNT: 16.1 % (ref 11.5–15)
GFR, ESTIMATED: 38 ML/MIN/1.73M2
GLUCOSE SERPL-MCNC: 121 MG/DL (ref 74–99)
HCO3: 52.7 MMOL/L (ref 22–26)
HCT VFR BLD AUTO: 46.6 % (ref 34–48)
HGB BLD-MCNC: 13.3 G/DL (ref 11.5–15.5)
HHB: 2.1 % (ref 0–5)
IMM GRANULOCYTES # BLD AUTO: 0.48 K/UL (ref 0–0.58)
IMM GRANULOCYTES NFR BLD: 4 % (ref 0–5)
INFLUENZA A BY PCR: NOT DETECTED
INFLUENZA B BY PCR: NOT DETECTED
LAB: ABNORMAL
LYMPHOCYTES NFR BLD: 0.95 K/UL (ref 1.5–4)
LYMPHOCYTES RELATIVE PERCENT: 9 % (ref 20–42)
Lab: 2203
MCH RBC QN AUTO: 26.8 PG (ref 26–35)
MCHC RBC AUTO-ENTMCNC: 28.5 G/DL (ref 32–34.5)
MCV RBC AUTO: 93.8 FL (ref 80–99.9)
METHB: 0.3 % (ref 0–1.5)
MODE: ABNORMAL
MONOCYTES NFR BLD: 0.93 K/UL (ref 0.1–0.95)
MONOCYTES NFR BLD: 8 % (ref 2–12)
NEUTROPHILS NFR BLD: 78 % (ref 43–80)
NEUTS SEG NFR BLD: 8.55 K/UL (ref 1.8–7.3)
O2 CONTENT: 19.4 ML/DL
O2 SATURATION: 97.9 % (ref 92–98.5)
O2HB: 96.8 % (ref 94–97)
OPERATOR ID: 3342
PATIENT TEMP: 37 C
PCO2: 97.2 MMHG (ref 35–45)
PH BLOOD GAS: 7.35 (ref 7.35–7.45)
PLATELET # BLD AUTO: 174 K/UL (ref 130–450)
PMV BLD AUTO: 11.6 FL (ref 7–12)
PO2: 102.1 MMHG (ref 75–100)
POTASSIUM SERPL-SCNC: 3.9 MMOL/L (ref 3.5–5)
PROT SERPL-MCNC: 6.1 G/DL (ref 6.4–8.3)
RBC # BLD AUTO: 4.97 M/UL (ref 3.5–5.5)
RBC # BLD: ABNORMAL 10*6/UL
SARS-COV-2 RDRP RESP QL NAA+PROBE: NOT DETECTED
SODIUM SERPL-SCNC: 142 MMOL/L (ref 132–146)
SOURCE, BLOOD GAS: ABNORMAL
SPECIMEN DESCRIPTION: NORMAL
THB: 14.2 G/DL (ref 11.5–16.5)
TIME ANALYZED: 2207
TROPONIN I SERPL HS-MCNC: 37 NG/L (ref 0–9)
TROPONIN I SERPL HS-MCNC: 38 NG/L (ref 0–9)
WBC OTHER # BLD: 11 K/UL (ref 4.5–11.5)

## 2025-03-17 PROCEDURE — 80053 COMPREHEN METABOLIC PANEL: CPT

## 2025-03-17 PROCEDURE — 87635 SARS-COV-2 COVID-19 AMP PRB: CPT

## 2025-03-17 PROCEDURE — 87502 INFLUENZA DNA AMP PROBE: CPT

## 2025-03-17 PROCEDURE — 85025 COMPLETE CBC W/AUTO DIFF WBC: CPT

## 2025-03-17 PROCEDURE — 84484 ASSAY OF TROPONIN QUANT: CPT

## 2025-03-17 PROCEDURE — 94660 CPAP INITIATION&MGMT: CPT

## 2025-03-17 PROCEDURE — 96374 THER/PROPH/DIAG INJ IV PUSH: CPT

## 2025-03-17 PROCEDURE — 6360000002 HC RX W HCPCS: Performed by: STUDENT IN AN ORGANIZED HEALTH CARE EDUCATION/TRAINING PROGRAM

## 2025-03-17 PROCEDURE — 83880 ASSAY OF NATRIURETIC PEPTIDE: CPT

## 2025-03-17 PROCEDURE — 93005 ELECTROCARDIOGRAM TRACING: CPT

## 2025-03-17 PROCEDURE — 71046 X-RAY EXAM CHEST 2 VIEWS: CPT

## 2025-03-17 PROCEDURE — 82805 BLOOD GASES W/O2 SATURATION: CPT

## 2025-03-17 PROCEDURE — 6370000000 HC RX 637 (ALT 250 FOR IP)

## 2025-03-17 PROCEDURE — 94640 AIRWAY INHALATION TREATMENT: CPT

## 2025-03-17 PROCEDURE — 99285 EMERGENCY DEPT VISIT HI MDM: CPT

## 2025-03-17 RX ORDER — FUROSEMIDE 10 MG/ML
20 INJECTION INTRAMUSCULAR; INTRAVENOUS ONCE
Status: COMPLETED | OUTPATIENT
Start: 2025-03-17 | End: 2025-03-17

## 2025-03-17 RX ORDER — IPRATROPIUM BROMIDE AND ALBUTEROL SULFATE 2.5; .5 MG/3ML; MG/3ML
3 SOLUTION RESPIRATORY (INHALATION) ONCE
Status: COMPLETED | OUTPATIENT
Start: 2025-03-17 | End: 2025-03-17

## 2025-03-17 RX ADMIN — FUROSEMIDE 20 MG: 10 INJECTION, SOLUTION INTRAMUSCULAR; INTRAVENOUS at 23:02

## 2025-03-17 RX ADMIN — IPRATROPIUM BROMIDE AND ALBUTEROL SULFATE 3 DOSE: .5; 2.5 SOLUTION RESPIRATORY (INHALATION) at 22:34

## 2025-03-18 VITALS
BODY MASS INDEX: 48.82 KG/M2 | HEART RATE: 61 BPM | DIASTOLIC BLOOD PRESSURE: 76 MMHG | OXYGEN SATURATION: 95 % | RESPIRATION RATE: 20 BRPM | WEIGHT: 293 LBS | SYSTOLIC BLOOD PRESSURE: 115 MMHG | TEMPERATURE: 97.5 F | HEIGHT: 65 IN

## 2025-03-18 LAB
B.E.: 15.3 MMOL/L (ref -3–3)
COHB: 0.9 % (ref 0–1.5)
CRITICAL: ABNORMAL
DATE ANALYZED: ABNORMAL
DATE OF COLLECTION: ABNORMAL
EKG ATRIAL RATE: 61 BPM
EKG Q-T INTERVAL: 406 MS
EKG QRS DURATION: 86 MS
EKG QTC CALCULATION (BAZETT): 415 MS
EKG R AXIS: -67 DEGREES
EKG T AXIS: 65 DEGREES
EKG VENTRICULAR RATE: 63 BPM
FIO2: 40 %
HCO3: 45 MMOL/L (ref 22–26)
HHB: 7.9 % (ref 0–5)
LAB: ABNORMAL
Lab: 5
METHB: 0.3 % (ref 0–1.5)
MODE: ABNORMAL
O2 CONTENT: 18.6 ML/DL
O2 SATURATION: 92 % (ref 92–98.5)
O2HB: 90.9 % (ref 94–97)
OPERATOR ID: 3342
PATIENT TEMP: 37 C
PCO2: 80.5 MMHG (ref 35–45)
PEEP/CPAP: 8 CMH2O
PFO2: 1.54 MMHG/%
PH BLOOD GAS: 7.37 (ref 7.35–7.45)
PO2: 61.7 MMHG (ref 75–100)
RI(T): 2.12
RR MECHANICAL: 22 B/MIN
SOURCE, BLOOD GAS: ABNORMAL
THB: 14.6 G/DL (ref 11.5–16.5)
TIME ANALYZED: 9
VT MECHANICAL: 500 ML

## 2025-03-18 PROCEDURE — 94660 CPAP INITIATION&MGMT: CPT

## 2025-03-18 PROCEDURE — 93010 ELECTROCARDIOGRAM REPORT: CPT | Performed by: INTERNAL MEDICINE

## 2025-03-18 PROCEDURE — 82805 BLOOD GASES W/O2 SATURATION: CPT

## 2025-03-18 NOTE — ED PROVIDER NOTES
[facility] EMERGENCY DEPARTMENT  EMERGENCY DEPARTMENT ENCOUNTER        Pt Name: Aggie Carter  MRN: [id number]  Birthdate 1945  Date of evaluation: 3/17/2025  Provider: Goldy Thibodeaux MD  PCP: Fuentes Herrera MD  Note Started: 9:25 PM EDT 3/17/25    CHIEF COMPLAINT       Chief Complaint   Patient presents with    Abnormal Lab     Sent in for CO2 > 40 and Cl 91       HISTORY OF PRESENT ILLNESS: 1 or more Elements        Limitations to history : None    Aggie Carter is a 79 y.o. female who presents to the emergency department for abnormal lab.  Patient for the last few days has had issues with cough and congestion, causing her to cough while attempting to use her BiPAP at night.  Some labs were checked at the facility, and she was noted to have a CO2 in the 40s, granddaughter at bedside notes that this is significantly lower than when she was discharged from the hospital, but was sent in by facility due to concern for potential decompensation with her not able to tolerate BiPAP at all hours the night.  On evaluation she is awake and alert and oriented x 3.  Denies any complaints other than the cough and congestion.  Does not appear to be listless or hypercapnic in appearance.  She wears 3 to 4 L of O2 since her discharge from the hospital from the admission on 2/19/2025 per the granddaughter.    Nursing Notes were all reviewed and agreed with or any disagreements were addressed in the HPI.      REVIEW OF EXTERNAL NOTE :       Admission 3/5/2025 patient was admitted for acute respiratory failure with acute on chronic hypoxemia and hypercapnia, congestive heart failure, RSV    REVIEW OF SYSTEMS :      Positives and Pertinent negatives as per HPI.     SURGICAL HISTORY     Past Surgical History:   Procedure Laterality Date    TUBAL LIGATION         CURRENTMEDICATIONS       Previous Medications    ACETAZOLAMIDE (DIAMOX) 250 MG TABLET    Take 1 tablet by mouth daily for 1 dose

## 2025-03-18 NOTE — ED PROVIDER NOTES
[facility] EMERGENCY DEPARTMENT  EMERGENCY DEPARTMENT ENCOUNTER        Pt Name: Aggie Carter  MRN: [id number]  Birthdate 1945  Date of evaluation: 3/17/2025  Provider: Sathish Pierce DO  PCP: Fuentes Herrera MD  Note Started: 9:40 PM EDT 3/17/25    CHIEF COMPLAINT       Chief Complaint   Patient presents with    Abnormal Lab     Sent in for CO2 > 40 and Cl 91       HISTORY OF PRESENT ILLNESS: 1 or more Elements   History From: Patient, EMS, and granddaughter    Limitations to history : None    Aggie Carter is a 79 y.o. female with past medical history significant for hypertension, hyperlipidemia, diabetes, leg edema, A-fib, obesity, stage III chronic kidney disease, heart failure and chronic bronchitis who presents to the ED for abnormal labs, patient's chloride and CO2 appear to be high on labs at nursing facility.  Upon arrival patient has no complaints other than abnormal labs.  Per granddaughter who is at bedside patient has not been tolerating BiPAP at night, has had worsening cough with sputum production that is yellow-green in nature, and there is fear that she will continue to decompensate.    Patient denies fever, chills, headache, chest pain, abdominal pain, nausea, vomiting, diarrhea, lightheadedness, dysuria, hematuria, hematochezia, and melena.    Nursing Notes were all reviewed and agreed with or any disagreements were addressed in the HPI.        REVIEW OF EXTERNAL NOTES :         Last outpatient visit was on 1/27/2025 with podiatry.      REVIEW OF SYSTEMS :           Positives and Pertinent negatives as per HPI.     SURGICAL HISTORY     Past Surgical History:   Procedure Laterality Date    TUBAL LIGATION         CURRENTMEDICATIONS       Previous Medications    ACETAZOLAMIDE (DIAMOX) 250 MG TABLET    Take 1 tablet by mouth daily for 1 dose    ALBUTEROL (PROAIR HFA) 108 (90 BASE) MCG/ACT INHALER    Inhale 1-2 puffs into the lungs every 6 hours as needed

## 2025-03-18 NOTE — DISCHARGE INSTRUCTIONS
You were evaluated in the Emergency Department today for shortness of breath. Your symptoms improved with Albuterol and steroids, and your evaluation did not show evidence of medical conditions requiring emergent intervention at this time.   Please follow up with your primary care physician within two days.  Return to the Emergency Department if you experience worsening shortness of breath, chest pain, headache, light headedness, or any other concerning symptoms.  Thank you for choosing us for your care.

## 2025-03-25 ENCOUNTER — RESULTS FOLLOW-UP (OUTPATIENT)
Age: 80
End: 2025-03-25

## 2025-03-25 ENCOUNTER — HOSPITAL ENCOUNTER (OUTPATIENT)
Dept: OTHER | Age: 80
Setting detail: THERAPIES SERIES
Discharge: HOME | End: 2025-03-25
Payer: MEDICARE

## 2025-03-25 VITALS
SYSTOLIC BLOOD PRESSURE: 100 MMHG | OXYGEN SATURATION: 100 % | BODY MASS INDEX: 44.43 KG/M2 | HEART RATE: 64 BPM | RESPIRATION RATE: 18 BRPM | WEIGHT: 267 LBS | DIASTOLIC BLOOD PRESSURE: 46 MMHG

## 2025-03-25 DIAGNOSIS — I50.20 HFREF (HEART FAILURE WITH REDUCED EJECTION FRACTION) (HCC): Primary | ICD-10-CM

## 2025-03-25 LAB
ANION GAP SERPL CALCULATED.3IONS-SCNC: 8 MMOL/L (ref 7–16)
BNP SERPL-MCNC: 3580 PG/ML (ref 0–450)
BUN SERPL-MCNC: 30 MG/DL (ref 6–23)
CALCIUM SERPL-MCNC: 9 MG/DL (ref 8.6–10.2)
CHLORIDE SERPL-SCNC: 88 MMOL/L (ref 98–107)
CO2 SERPL-SCNC: 43 MMOL/L (ref 22–29)
CREAT SERPL-MCNC: 1.5 MG/DL (ref 0.5–1)
GFR, ESTIMATED: 34 ML/MIN/1.73M2
GLUCOSE SERPL-MCNC: 118 MG/DL (ref 74–99)
POTASSIUM SERPL-SCNC: 3.9 MMOL/L (ref 3.5–5)
SODIUM SERPL-SCNC: 139 MMOL/L (ref 132–146)

## 2025-03-25 PROCEDURE — 99205 OFFICE O/P NEW HI 60 MIN: CPT

## 2025-03-25 PROCEDURE — 80048 BASIC METABOLIC PNL TOTAL CA: CPT

## 2025-03-25 PROCEDURE — 2500000003 HC RX 250 WO HCPCS: Performed by: NURSE PRACTITIONER

## 2025-03-25 PROCEDURE — 83880 ASSAY OF NATRIURETIC PEPTIDE: CPT

## 2025-03-25 PROCEDURE — 36415 COLL VENOUS BLD VENIPUNCTURE: CPT

## 2025-03-25 PROCEDURE — 96374 THER/PROPH/DIAG INJ IV PUSH: CPT

## 2025-03-25 PROCEDURE — 6360000002 HC RX W HCPCS

## 2025-03-25 RX ORDER — FUROSEMIDE 10 MG/ML
INJECTION INTRAMUSCULAR; INTRAVENOUS
Status: COMPLETED
Start: 2025-03-25 | End: 2025-03-25

## 2025-03-25 RX ORDER — TORSEMIDE 20 MG/1
60 TABLET ORAL DAILY
Qty: 90 TABLET | Refills: 3 | Status: SHIPPED | OUTPATIENT
Start: 2025-03-25 | End: 2025-05-23

## 2025-03-25 RX ORDER — SODIUM CHLORIDE 0.9 % (FLUSH) 0.9 %
10 SYRINGE (ML) INJECTION 2 TIMES DAILY
Status: DISCONTINUED | OUTPATIENT
Start: 2025-03-25 | End: 2025-03-26 | Stop reason: HOSPADM

## 2025-03-25 RX ORDER — POTASSIUM CHLORIDE 1500 MG/1
20 TABLET, EXTENDED RELEASE ORAL DAILY
Qty: 90 TABLET | Refills: 1 | Status: SHIPPED
Start: 2025-03-25 | End: 2025-04-08 | Stop reason: SDUPTHER

## 2025-03-25 RX ORDER — BISACODYL 10 MG
10 SUPPOSITORY, RECTAL RECTAL DAILY PRN
COMMUNITY

## 2025-03-25 RX ORDER — BUMETANIDE 0.5 MG/1
0.5 TABLET ORAL DAILY
COMMUNITY
End: 2025-03-25 | Stop reason: ALTCHOICE

## 2025-03-25 RX ORDER — FUROSEMIDE 10 MG/ML
40 INJECTION INTRAMUSCULAR; INTRAVENOUS ONCE
Status: COMPLETED | OUTPATIENT
Start: 2025-03-25 | End: 2025-03-25

## 2025-03-25 RX ORDER — GUAIFENESIN/DEXTROMETHORPHAN 100-10MG/5
5 SYRUP ORAL 4 TIMES DAILY PRN
COMMUNITY

## 2025-03-25 RX ADMIN — SODIUM CHLORIDE, PRESERVATIVE FREE 10 ML: 5 INJECTION INTRAVENOUS at 13:34

## 2025-03-25 RX ADMIN — FUROSEMIDE 40 MG: 10 INJECTION INTRAMUSCULAR; INTRAVENOUS at 13:34

## 2025-03-25 RX ADMIN — FUROSEMIDE 40 MG: 10 INJECTION, SOLUTION INTRAMUSCULAR; INTRAVENOUS at 13:34

## 2025-03-25 ASSESSMENT — PATIENT HEALTH QUESTIONNAIRE - PHQ9
SUM OF ALL RESPONSES TO PHQ QUESTIONS 1-9: 0
2. FEELING DOWN, DEPRESSED OR HOPELESS: NOT AT ALL
SUM OF ALL RESPONSES TO PHQ QUESTIONS 1-9: 0
1. LITTLE INTEREST OR PLEASURE IN DOING THINGS: NOT AT ALL

## 2025-03-25 NOTE — RESULT ENCOUNTER NOTE
Labs and CHF clinic note reviewed  Patient being given both lasix and bumex  Stop both    Start Torsemide 60 mg daily   Stop potassium gluconate   Start 20 meq potassium   Follow up BMP in 1 week and fax to CHF clinic   Follow up in CHF clinic as scheduled     Thank you

## 2025-03-25 NOTE — PROGRESS NOTES
Congestive Heart Failure Clinic   [facility]      Referring Provider:   Primary Care Physician: Fuentes Herrera MD   Cardiologist: Dr. Ceja   Nephrologist: Nino      HISTORY OF PRESENT ILLNESS:     Aggie Carter is a 79 y.o. (1945) female with a history of HFpEF (EF> 50%)  Pre Cupid:     Lab Results   Component Value Date    LVEF 50 02/26/2025     Post Cupid:    Lab Results   Component Value Date    EFBP 61 02/20/2025         She presents to the CHF clinic for ongoing evaluation and monitoring of heart failure.    In the CHF clinic today she denies any adverse symptoms except:  [] Dizziness or lightheadedness   [] Syncope or near syncope  [] Recent Fall  [] Shortness of breath at rest   [x] Dyspnea with exertion  [] Decline in functional capacity (unable to perform activities they had previously been able to do)  [] Fatigue   [] Orthopnea  [] PND  [] Nocturnal cough  [] Hemoptysis  [] Chest pain, pressure, or discomfort  [] Palpitations  [] Abdominal distention  [] Abdominal bloating  [] Early satiety  [] Blood in stool   [] Diarrhea  [] Constipation  [] Nausea/Vomiting  [] Decreased urinary response to oral diuretic   [] Scrotal swelling   [x] Lower extremity edema 4+ pitting BLE  [] Used PRN doses of oral diuretic   [] Weight gain    Wt Readings from Last 3 Encounters:   03/25/25 121.1 kg (267 lb)   03/17/25 135.6 kg (299 lb)   03/12/25 136 kg (299 lb 14.4 oz)           SOCIAL HISTORY:  [x] Denies tobacco, alcohol or illicit drug abuse  [] Tobacco use:  [] ETOH use:  [] Illicit drug use:        MEDICATIONS:    No Known Allergies  Prior to Visit Medications    Medication Sig Taking? Authorizing Provider   budesonide (PULMICORT) 0.5 MG/2ML nebulizer suspension Take 2 mLs by nebulization in the morning and 2 mLs in the evening. Yes Cene, Melissa, APRN - CNP   arformoterol tartrate (BROVANA) 15 MCG/2ML NEBU Take 2 mLs by nebulization in the morning

## 2025-03-27 ENCOUNTER — TELEPHONE (OUTPATIENT)
Dept: OTHER | Age: 80
End: 2025-03-27

## 2025-03-27 NOTE — TELEPHONE ENCOUNTER
10:38 AM  Received call from [facility]. Patient with 4 lb weight gain overnight.     FR 1800 ml. Just returned from Dr. Mckeon's office (no new orders).     Just seen in CHF clinic 2 days ago and the following changes were made:             Labs and CHF clinic note reviewed  Patient being given both lasix and bumex  Stop both     Start Torsemide 60 mg daily   Stop potassium gluconate   Start 20 meq potassium   Follow up BMP in 1 week and fax to CHF clinic   Follow up in CHF clinic as scheduled           Nursing would like to know if there are any new orders?    Electronically signed by Sintia Prieto RN on 3/27/2025 at 10:41 AM

## 2025-04-01 ENCOUNTER — TELEPHONE (OUTPATIENT)
Dept: OTHER | Age: 80
End: 2025-04-01

## 2025-04-01 ENCOUNTER — OFFICE VISIT (OUTPATIENT)
Dept: CARDIOLOGY CLINIC | Age: 80
End: 2025-04-01

## 2025-04-01 ENCOUNTER — RESULTS FOLLOW-UP (OUTPATIENT)
Age: 80
End: 2025-04-01

## 2025-04-01 VITALS
BODY MASS INDEX: 46.78 KG/M2 | HEART RATE: 62 BPM | DIASTOLIC BLOOD PRESSURE: 60 MMHG | OXYGEN SATURATION: 99 % | SYSTOLIC BLOOD PRESSURE: 100 MMHG | RESPIRATION RATE: 20 BRPM | HEIGHT: 64 IN | WEIGHT: 274 LBS | TEMPERATURE: 96.8 F

## 2025-04-01 DIAGNOSIS — I48.0 PAF (PAROXYSMAL ATRIAL FIBRILLATION) (HCC): ICD-10-CM

## 2025-04-01 DIAGNOSIS — I50.33 ACUTE ON CHRONIC HEART FAILURE WITH PRESERVED EJECTION FRACTION (HFPEF) (HCC): Primary | ICD-10-CM

## 2025-04-01 DIAGNOSIS — I10 PRIMARY HYPERTENSION: ICD-10-CM

## 2025-04-01 DIAGNOSIS — Z79.01 CHRONIC ANTICOAGULATION: ICD-10-CM

## 2025-04-01 DIAGNOSIS — I35.0 NONRHEUMATIC AORTIC VALVE STENOSIS: ICD-10-CM

## 2025-04-01 NOTE — TELEPHONE ENCOUNTER
Brinda Snowden, APRN - CNP to Sintia Prieto RN       4/1/25  2:54 PM  Note      Labs reviewed  Stop potassium   Follow up tomorrow as scheduled     Thank you               Call placed to [facility] instructions given to nurse Maylin Carlisle verbalized understanding and all questions answered at this time.        Electronically signed by Chevy Cohen RN on 4/1/2025 at 3:30 PM

## 2025-04-01 NOTE — PROGRESS NOTES
OFFICE FOLLOW-UP    Name: Aggie Carter     Age: 79 y.o.    Date of Service: 2025    Chief Complaint: Follow-up for HFpEF, aortic stenosis, AF    Referring Physician: Fuentes Herrera MD    Interim History:   Chronic A[place] with mild levels of exertion (\"stable\" per patient). ++LE edema / history of LE lymphedema (more pronounced LE edema recently). She ambulates with a walker/wheelchair. On chronic O2. She denies chest pain, palpitations, PND, orthopnea, or syncope. Currently with no active cardiac complaints at rest. Rate controlled AF on EKG. She currently resides at [facility].    Review of Systems:   Cardiac: As per HPI  General: No fever, chills  Pulmonary: As per HPI  HEENT: No visual disturbances, difficult swallowing  GI: No nausea, vomiting  : No dysuria, hematuria  Endocrine: +thyroid disease, +DM  Musculoskeletal: LAND x 4, no focal motor deficits  Skin: Intact, no rashes  Neuro: No headache, seizures  Psych: Currently with no depression, anxiety    Past Medical History:  Past Medical History:   Diagnosis Date    Atrial fibrillation (HCC)     Dyspnea on exertion     Family history of heart disease     Hyperlipidemia     Hypertension     Mild aortic stenosis 05/10/2013       Past Surgical History:  Past Surgical History:   Procedure Laterality Date    TUBAL LIGATION         Family History:  Family History   Problem Relation Age of Onset    Heart Attack Brother        Social History:  Social History     Socioeconomic History    Marital status:      Spouse name: Not on file    Number of children: Not on file    Years of education: Not on file    Highest education level: Not on file   Occupational History    Not on file   Tobacco Use    Smoking status: Former     Current packs/day: 0.00     Average packs/day: 0.3 packs/day for 1 year (0.3 ttl pk-yrs)     Types: Cigarettes     Start date: 1964     Quit date: 1965     Years since quittin.2    Smokeless

## 2025-04-02 ENCOUNTER — HOSPITAL ENCOUNTER (OUTPATIENT)
Dept: OTHER | Age: 80
Setting detail: THERAPIES SERIES
Discharge: HOME | End: 2025-04-02
Payer: MEDICARE

## 2025-04-02 ENCOUNTER — RESULTS FOLLOW-UP (OUTPATIENT)
Age: 80
End: 2025-04-02

## 2025-04-02 VITALS
HEART RATE: 77 BPM | WEIGHT: 273.8 LBS | RESPIRATION RATE: 20 BRPM | OXYGEN SATURATION: 98 % | DIASTOLIC BLOOD PRESSURE: 55 MMHG | BODY MASS INDEX: 47 KG/M2 | SYSTOLIC BLOOD PRESSURE: 94 MMHG

## 2025-04-02 LAB
ANION GAP SERPL CALCULATED.3IONS-SCNC: 4 MMOL/L (ref 7–16)
BNP SERPL-MCNC: 2884 PG/ML (ref 0–450)
BUN SERPL-MCNC: 30 MG/DL (ref 6–23)
CALCIUM SERPL-MCNC: 9.3 MG/DL (ref 8.6–10.2)
CHLORIDE SERPL-SCNC: 92 MMOL/L (ref 98–107)
CO2 SERPL-SCNC: 43 MMOL/L (ref 22–29)
CREAT SERPL-MCNC: 1.7 MG/DL (ref 0.5–1)
GFR, ESTIMATED: 31 ML/MIN/1.73M2
GLUCOSE SERPL-MCNC: 162 MG/DL (ref 74–99)
POTASSIUM SERPL-SCNC: 4.8 MMOL/L (ref 3.5–5)
SODIUM SERPL-SCNC: 139 MMOL/L (ref 132–146)

## 2025-04-02 PROCEDURE — 83880 ASSAY OF NATRIURETIC PEPTIDE: CPT

## 2025-04-02 PROCEDURE — 6360000002 HC RX W HCPCS

## 2025-04-02 PROCEDURE — 36415 COLL VENOUS BLD VENIPUNCTURE: CPT

## 2025-04-02 PROCEDURE — 96374 THER/PROPH/DIAG INJ IV PUSH: CPT

## 2025-04-02 PROCEDURE — 2500000003 HC RX 250 WO HCPCS: Performed by: NURSE PRACTITIONER

## 2025-04-02 PROCEDURE — 80048 BASIC METABOLIC PNL TOTAL CA: CPT

## 2025-04-02 PROCEDURE — 99214 OFFICE O/P EST MOD 30 MIN: CPT

## 2025-04-02 RX ORDER — METOLAZONE 2.5 MG/1
2.5 TABLET ORAL
Qty: 12 TABLET | Refills: 3 | Status: SHIPPED
Start: 2025-04-02 | End: 2025-04-08 | Stop reason: SINTOL

## 2025-04-02 RX ORDER — BUMETANIDE 0.25 MG/ML
INJECTION, SOLUTION INTRAMUSCULAR; INTRAVENOUS
Status: DISPENSED
Start: 2025-04-02 | End: 2025-04-02

## 2025-04-02 RX ORDER — BUMETANIDE 0.25 MG/ML
2 INJECTION, SOLUTION INTRAMUSCULAR; INTRAVENOUS ONCE
Status: COMPLETED | OUTPATIENT
Start: 2025-04-02 | End: 2025-04-02

## 2025-04-02 RX ORDER — SODIUM CHLORIDE 0.9 % (FLUSH) 0.9 %
10 SYRINGE (ML) INJECTION 2 TIMES DAILY
Status: DISCONTINUED | OUTPATIENT
Start: 2025-04-02 | End: 2025-04-03 | Stop reason: HOSPADM

## 2025-04-02 RX ORDER — BUMETANIDE 0.25 MG/ML
INJECTION, SOLUTION INTRAMUSCULAR; INTRAVENOUS
Status: COMPLETED
Start: 2025-04-02 | End: 2025-04-02

## 2025-04-02 RX ADMIN — BUMETANIDE 2 MG: 0.25 INJECTION, SOLUTION INTRAMUSCULAR; INTRAVENOUS at 08:38

## 2025-04-02 RX ADMIN — SODIUM CHLORIDE, PRESERVATIVE FREE 10 ML: 5 INJECTION INTRAVENOUS at 08:46

## 2025-04-02 RX ADMIN — BUMETANIDE 2 MG: 0.25 INJECTION INTRAMUSCULAR; INTRAVENOUS at 08:38

## 2025-04-02 NOTE — RESULT ENCOUNTER NOTE
Labs and CHF Clinic note reviewed    Wt Readings from Last 3 Encounters:  04/02/25 : 124.2 kg (273 lb 12.8 oz)  04/01/25 : 124.3 kg (274 lb)  03/25/25 : 121.1 kg (267 lb)    BP Readings from Last 1 Encounters:  04/02/25 : (!) 94/55    Pulse Readings from Last 1 Encounters:  04/02/25 : 77      STAT appointment today for 6 lb weight gain despite increasing Torsemide to 60 mg daily.   It is imperative that patient starts wearing her BiPaP    Received IV diuretics today.   Start Metolazone 2.5 mg three times weekly   Follow up in CHF clinic in 5-7 days     Thank you

## 2025-04-08 ENCOUNTER — RESULTS FOLLOW-UP (OUTPATIENT)
Age: 80
End: 2025-04-08

## 2025-04-08 ENCOUNTER — HOSPITAL ENCOUNTER (OUTPATIENT)
Dept: OTHER | Age: 80
Setting detail: THERAPIES SERIES
Discharge: HOME | End: 2025-04-08
Payer: MEDICARE

## 2025-04-08 VITALS
WEIGHT: 268 LBS | HEART RATE: 72 BPM | RESPIRATION RATE: 18 BRPM | SYSTOLIC BLOOD PRESSURE: 126 MMHG | DIASTOLIC BLOOD PRESSURE: 56 MMHG | OXYGEN SATURATION: 99 % | BODY MASS INDEX: 46 KG/M2

## 2025-04-08 DIAGNOSIS — I50.33 ACUTE ON CHRONIC HEART FAILURE WITH PRESERVED EJECTION FRACTION (HCC): Primary | ICD-10-CM

## 2025-04-08 LAB
ANION GAP SERPL CALCULATED.3IONS-SCNC: 10 MMOL/L (ref 7–16)
BNP SERPL-MCNC: 2994 PG/ML (ref 0–450)
BUN SERPL-MCNC: 41 MG/DL (ref 6–23)
CALCIUM SERPL-MCNC: 9.5 MG/DL (ref 8.6–10.2)
CHLORIDE SERPL-SCNC: 88 MMOL/L (ref 98–107)
CO2 SERPL-SCNC: 39 MMOL/L (ref 22–29)
CREAT SERPL-MCNC: 1.9 MG/DL (ref 0.5–1)
GFR, ESTIMATED: 27 ML/MIN/1.73M2
GLUCOSE SERPL-MCNC: 112 MG/DL (ref 74–99)
POTASSIUM SERPL-SCNC: 3 MMOL/L (ref 3.5–5)
SODIUM SERPL-SCNC: 137 MMOL/L (ref 132–146)

## 2025-04-08 PROCEDURE — 83880 ASSAY OF NATRIURETIC PEPTIDE: CPT

## 2025-04-08 PROCEDURE — 80048 BASIC METABOLIC PNL TOTAL CA: CPT

## 2025-04-08 PROCEDURE — 99215 OFFICE O/P EST HI 40 MIN: CPT

## 2025-04-08 PROCEDURE — 96374 THER/PROPH/DIAG INJ IV PUSH: CPT

## 2025-04-08 PROCEDURE — 36415 COLL VENOUS BLD VENIPUNCTURE: CPT

## 2025-04-08 RX ORDER — POTASSIUM CHLORIDE 750 MG/1
10 TABLET, EXTENDED RELEASE ORAL DAILY
Qty: 90 TABLET | Refills: 3 | Status: SHIPPED | OUTPATIENT
Start: 2025-04-08

## 2025-04-08 RX ORDER — BUMETANIDE 0.25 MG/ML
2 INJECTION, SOLUTION INTRAMUSCULAR; INTRAVENOUS ONCE
Status: DISCONTINUED | OUTPATIENT
Start: 2025-04-08 | End: 2025-04-09 | Stop reason: HOSPADM

## 2025-04-08 RX ORDER — BUMETANIDE 0.25 MG/ML
INJECTION, SOLUTION INTRAMUSCULAR; INTRAVENOUS
Status: DISPENSED
Start: 2025-04-08 | End: 2025-04-08

## 2025-04-08 RX ORDER — SODIUM CHLORIDE 0.9 % (FLUSH) 0.9 %
10 SYRINGE (ML) INJECTION ONCE
Status: DISCONTINUED | OUTPATIENT
Start: 2025-04-08 | End: 2025-04-09 | Stop reason: HOSPADM

## 2025-04-08 NOTE — RESULT ENCOUNTER NOTE
Labs and CHF clinic note reviewed  Give 40 meq potassium today and then start 10 meq potassium daily tomorrow   Hold metolazone   Follow up BMP on Thursday and fax to CHF clinic

## 2025-04-08 NOTE — PROGRESS NOTES
Congestive Heart Failure Clinic   [facility]      Referring Provider:   Primary Care Physician: Fuentes Herrera MD   Cardiologist: Dr. Ceja   Nephrologist: Nino      HISTORY OF PRESENT ILLNESS:    Aggie Carter is a 79 y.o. (1945) female with a history of HFpEF (EF> 50%)  Pre Cupid:     Lab Results   Component Value Date    LVEF 50 02/26/2025     Post Cupid:    Lab Results   Component Value Date    EFBP 61 02/20/2025     She presents to the CHF clinic for ongoing evaluation and monitoring of heart failure.  In the CHF clinic today she denies any adverse symptoms except:  [] Dizziness or lightheadedness   [] Syncope or near syncope  [] Recent Fall  [] Shortness of breath at rest   [x] Dyspnea with exertion  [] Decline in functional capacity (unable to perform activities they had previously been able to do)  [] Fatigue   [x] Orthopnea  [x] PND  [] Nocturnal cough  [] Hemoptysis  [] Chest pain, pressure, or discomfort  [] Palpitations  [] Abdominal distention  [] Abdominal bloating  [] Early satiety  [] Blood in stool   [] Diarrhea  [] Constipation  [] Nausea/Vomiting  [] Decreased urinary response to oral diuretic   [] Scrotal swelling   [x] Lower extremity edema 3+ pitting BLE   [] Used PRN doses of oral diuretic   [] Weight gain     Wt Readings from Last 3 Encounters:   04/08/25 121.6 kg (268 lb)   04/02/25 124.2 kg (273 lb 12.8 oz)   04/01/25 124.3 kg (274 lb)       SOCIAL HISTORY:  [x] Denies tobacco, alcohol or illicit drug abuse  [] Tobacco use:  [] ETOH use:  [] Illicit drug use:        MEDICATIONS:    Allergies   Allergen Reactions    Dust Mite Extract Other (See Comments)    Grass Pollen(K-O-R-T-Swt Esa) Other (See Comments)    Prednisone Swelling     Prior to Visit Medications    Medication Sig Taking? Authorizing Provider   OXYGEN Inhale 4 L into the lungs Yes Provider, MD nathaniel MadisonFENesin-dextromethorphan (ROBITUSSIN DM)

## 2025-04-16 PROBLEM — J96.11 CHRONIC RESPIRATORY FAILURE WITH HYPOXIA AND HYPERCAPNIA (HCC): Status: ACTIVE | Noted: 2025-04-16

## 2025-04-16 PROBLEM — E66.01 MORBID OBESITY (HCC): Status: ACTIVE | Noted: 2025-04-16

## 2025-04-16 PROBLEM — J96.12 CHRONIC RESPIRATORY FAILURE WITH HYPOXIA AND HYPERCAPNIA (HCC): Status: ACTIVE | Noted: 2025-04-16

## 2025-04-16 PROBLEM — E66.2 OBESITY HYPOVENTILATION SYNDROME (HCC): Status: ACTIVE | Noted: 2025-04-16

## 2025-04-16 PROBLEM — G47.33 OSA (OBSTRUCTIVE SLEEP APNEA): Status: ACTIVE | Noted: 2025-04-16

## 2025-04-30 ENCOUNTER — HOSPITAL ENCOUNTER (OUTPATIENT)
Dept: OTHER | Age: 80
Setting detail: THERAPIES SERIES
Discharge: HOME | End: 2025-04-30
Payer: MEDICARE

## 2025-04-30 ENCOUNTER — RESULTS FOLLOW-UP (OUTPATIENT)
Age: 80
End: 2025-04-30

## 2025-04-30 VITALS
BODY MASS INDEX: 43.67 KG/M2 | RESPIRATION RATE: 18 BRPM | SYSTOLIC BLOOD PRESSURE: 92 MMHG | WEIGHT: 254.4 LBS | OXYGEN SATURATION: 96 % | DIASTOLIC BLOOD PRESSURE: 50 MMHG | HEART RATE: 73 BPM

## 2025-04-30 LAB
ANION GAP SERPL CALCULATED.3IONS-SCNC: 8 MMOL/L (ref 7–16)
BNP SERPL-MCNC: 1949 PG/ML (ref 0–450)
BUN SERPL-MCNC: 32 MG/DL (ref 6–23)
CALCIUM SERPL-MCNC: 9.3 MG/DL (ref 8.6–10.2)
CHLORIDE SERPL-SCNC: 94 MMOL/L (ref 98–107)
CO2 SERPL-SCNC: 37 MMOL/L (ref 22–29)
CREAT SERPL-MCNC: 1.5 MG/DL (ref 0.5–1)
GFR, ESTIMATED: 36 ML/MIN/1.73M2
GLUCOSE SERPL-MCNC: 109 MG/DL (ref 74–99)
POTASSIUM SERPL-SCNC: 3.7 MMOL/L (ref 3.5–5)
SODIUM SERPL-SCNC: 139 MMOL/L (ref 132–146)

## 2025-04-30 PROCEDURE — 80048 BASIC METABOLIC PNL TOTAL CA: CPT

## 2025-04-30 PROCEDURE — 36415 COLL VENOUS BLD VENIPUNCTURE: CPT

## 2025-04-30 PROCEDURE — 83880 ASSAY OF NATRIURETIC PEPTIDE: CPT

## 2025-04-30 PROCEDURE — 99214 OFFICE O/P EST MOD 30 MIN: CPT

## 2025-04-30 NOTE — PROGRESS NOTES
Congestive Heart Failure Clinic   [facility]      Referring Provider:   Primary Care Physician: Fuentes Herrera MD   Cardiologist: Dr. Ceja   Nephrologist: Nino      HISTORY OF PRESENT ILLNESS:    Aggie Carter is a 79 y.o. (1945) female with a history of HFpEF (EF> 50%)  Pre Cupid:     Lab Results   Component Value Date    LVEF 50 02/26/2025     Post Cupid:    Lab Results   Component Value Date    EFBP 61 02/20/2025     She presents to the CHF clinic for ongoing evaluation and monitoring of heart failure.  In the CHF clinic today she denies any adverse symptoms except:  [] Dizziness or lightheadedness   [] Syncope or near syncope  [] Recent Fall  [] Shortness of breath at rest   [x] Dyspnea with exertion  [] Decline in functional capacity (unable to perform activities they had previously been able to do)  [] Fatigue   [] Orthopnea  [] PND  [] Nocturnal cough  [] Hemoptysis  [] Chest pain, pressure, or discomfort  [] Palpitations  [] Abdominal distention  [] Abdominal bloating  [] Early satiety  [] Blood in stool   [] Diarrhea  [] Constipation  [] Nausea/Vomiting  [] Decreased urinary response to oral diuretic   [] Scrotal swelling   [x] Lower extremity edema 2+ pitting BLE   [] Used PRN doses of oral diuretic   [] Weight gain     Wt Readings from Last 3 Encounters:   04/30/25 115.4 kg (254 lb 6.4 oz)   04/16/25 121.6 kg (268 lb)   04/08/25 121.6 kg (268 lb)       SOCIAL HISTORY:  [x] Denies tobacco, alcohol or illicit drug abuse  [] Tobacco use:  [] ETOH use:  [] Illicit drug use:        MEDICATIONS:    Allergies   Allergen Reactions    Dust Mite Extract Other (See Comments)    Grass Pollen(K-O-R-T-Swt Esa) Other (See Comments)    Prednisone Swelling     Prior to Visit Medications    Medication Sig Taking? Authorizing Provider   formoterol (PERFOROMIST) 20 MCG/2ML nebulizer solution Take 2 mLs by nebulization every 12 hours Yes Provider,

## 2025-04-30 NOTE — RESULT ENCOUNTER NOTE
Labs and CHF Clinic note reviewed    HFpEF    At last visit on 4/8/2025, patient advised to hold metolazone and was ordered to receive potassium 40 mEq p.o. x 1 followed by 10 mEq daily to start the following day.  Repeat BMP from 4/10/2025 showed resolution of hypokalemia.    Euvolemic in clinic today patient's weight is down 14 pounds from last visit on 4/8/2025.  Urinating well on oral diuretic    Wt Readings from Last 3 Encounters:  04/30/25 : 115.4 kg (254 lb 6.4 oz)  04/16/25 : 121.6 kg (268 lb)  04/08/25 : 121.6 kg (268 lb)    BP Readings from Last 1 Encounters:  04/30/25 : (!) 92/50    Pulse Readings from Last 1 Encounters:  04/30/25 : 73      Current GDMT:  Toprol-XL 25 mg p.o. twice daily  Torsemide 60 mg p.o. daily  Potassium chloride 10 mEq p.o. daily    Metolazone 2.5 mg 3 times weekly stopped on 4/8/2025 due to hypokalemia and worsening renal function    1. Continue cardiac meds the same   2.  Will consider addition of SGLT2 inhibitor +/-MRA in future if renal function remains stable and BP tolerates.  3.  Follow-up with CHF clinic on 5/23/2025 as scheduled

## 2025-05-23 ENCOUNTER — HOSPITAL ENCOUNTER (OUTPATIENT)
Dept: OTHER | Age: 80
Setting detail: THERAPIES SERIES
Discharge: HOME | End: 2025-05-23
Payer: MEDICARE

## 2025-05-23 ENCOUNTER — RESULTS FOLLOW-UP (OUTPATIENT)
Age: 80
End: 2025-05-23

## 2025-05-23 VITALS
SYSTOLIC BLOOD PRESSURE: 110 MMHG | HEART RATE: 72 BPM | WEIGHT: 249.4 LBS | RESPIRATION RATE: 16 BRPM | DIASTOLIC BLOOD PRESSURE: 73 MMHG | BODY MASS INDEX: 42.81 KG/M2 | OXYGEN SATURATION: 97 %

## 2025-05-23 DIAGNOSIS — I50.32 CHRONIC HEART FAILURE WITH PRESERVED EJECTION FRACTION (HFPEF) (HCC): Primary | ICD-10-CM

## 2025-05-23 LAB
ANION GAP SERPL CALCULATED.3IONS-SCNC: 9 MMOL/L (ref 7–16)
BNP SERPL-MCNC: 1458 PG/ML (ref 0–450)
BUN SERPL-MCNC: 39 MG/DL (ref 6–23)
CALCIUM SERPL-MCNC: 9.4 MG/DL (ref 8.6–10.2)
CHLORIDE SERPL-SCNC: 96 MMOL/L (ref 98–107)
CO2 SERPL-SCNC: 35 MMOL/L (ref 22–29)
CREAT SERPL-MCNC: 1.4 MG/DL (ref 0.5–1)
GFR, ESTIMATED: 39 ML/MIN/1.73M2
GLUCOSE SERPL-MCNC: 105 MG/DL (ref 74–99)
POTASSIUM SERPL-SCNC: 3.7 MMOL/L (ref 3.5–5)
SODIUM SERPL-SCNC: 140 MMOL/L (ref 132–146)

## 2025-05-23 PROCEDURE — 99214 OFFICE O/P EST MOD 30 MIN: CPT

## 2025-05-23 PROCEDURE — 36415 COLL VENOUS BLD VENIPUNCTURE: CPT

## 2025-05-23 PROCEDURE — 80048 BASIC METABOLIC PNL TOTAL CA: CPT

## 2025-05-23 PROCEDURE — 83880 ASSAY OF NATRIURETIC PEPTIDE: CPT

## 2025-05-23 RX ORDER — TORSEMIDE 20 MG/1
40 TABLET ORAL DAILY
Qty: 60 TABLET | Refills: 3 | Status: SHIPPED | OUTPATIENT
Start: 2025-05-23

## 2025-05-23 NOTE — PROGRESS NOTES
Pulse: 72               LAB DATA:  BMP:  Sodium (mmol/L)   Date Value   05/23/2025 140   04/30/2025 139   04/08/2025 137     Potassium (mmol/L)   Date Value   05/23/2025 3.7   04/30/2025 3.7   04/08/2025 3.0 (L)     Potassium reflex Magnesium (mmol/L)   Date Value   04/14/2019 4.1     Chloride (mmol/L)   Date Value   05/23/2025 96 (L)   04/30/2025 94 (L)   04/08/2025 88 (L)     CO2 (mmol/L)   Date Value   05/23/2025 35 (H)   04/30/2025 37 (H)   04/08/2025 39 (H)     BUN (mg/dL)   Date Value   05/23/2025 39 (H)   04/30/2025 32 (H)   04/08/2025 41 (H)     Creatinine (mg/dL)   Date Value   05/23/2025 1.4 (H)   04/30/2025 1.5 (H)   04/08/2025 1.9 (H)     Glucose (mg/dL)   Date Value   05/23/2025 105 (H)   04/30/2025 109 (H)   04/08/2025 112 (H)     Calcium (mg/dL)   Date Value   05/23/2025 9.4   04/30/2025 9.3   04/08/2025 9.5     BNP:  NT Pro-BNP (pg/mL)   Date Value   05/23/2025 1,458 (H)   04/30/2025 1,949 (H)   04/08/2025 2,994 (H)      CBC:  WBC (k/uL)   Date Value   03/17/2025 11.0     Hemoglobin (g/dL)   Date Value   03/17/2025 13.3     Hematocrit (%)   Date Value   03/17/2025 46.6     Platelets (k/uL)   Date Value   03/17/2025 174     Iron Studies:  Ferritin (ng/mL)   Date Value   03/06/2025 612     Iron (ug/dL)   Date Value   03/06/2025 49     TIBC (ug/dL)   Date Value   03/06/2025 260     Hepatic:  AST (U/L)   Date Value   03/17/2025 18     ALT (U/L)   Date Value   03/17/2025 35 (H)     Total Bilirubin (mg/dL)   Date Value   03/17/2025 0.4     Alkaline Phosphatase (U/L)   Date Value   03/17/2025 85     INR:  INR (no units)   Date Value   04/14/2019 1.0         Wt Readings from Last 3 Encounters:   05/23/25 113.1 kg (249 lb 6.4 oz)   04/30/25 115.4 kg (254 lb 6.4 oz)   04/16/25 121.6 kg (268 lb)           ASSESSMENT/PLAN:    [x] Euvolemic          [] Hypervolemic, with increase from baseline:  [] Shortness of breath/AUSTIN  [] JVD  [] HJR  [] Abnormal lung assessment:  [] Orthopnea  [] PND  [] Decreased urinary  Azithromycin Counseling:  I discussed with the patient the risks of azithromycin including but not limited to GI upset, allergic reaction, drug rash, diarrhea, and yeast infections.

## 2025-05-23 NOTE — RESULT ENCOUNTER NOTE
Labs and CHF Clinic note reviewed    HFpEF    Here for 1 month follow-up.  Euvolemic on exam in clinic today weight 249 pounds and 6 ounces today compared to 254 pounds and 6 ounces at last visit on 4/30/2025  Feeling good and reports good urinary response to oral diuretic    Wt Readings from Last 3 Encounters:  05/23/25 : 113.1 kg (249 lb 6.4 oz)  04/30/25 : 115.4 kg (254 lb 6.4 oz)  04/16/25 : 121.6 kg (268 lb)    BP Readings from Last 1 Encounters:  05/23/25 : 110/73    Pulse Readings from Last 1 Encounters:  05/23/25 : 72      Current GDMT:  Toprol-XL 25 mg p.o. twice daily  Torsemide 60 mg daily  Potassium chloride 10 mEq p.o. daily    NT proBNP elevated at 1458 compared to 1949 on 4/30/2025 and 2994 on 4/8/2025  BMP shows stable renal function and electrolytes compared to prior.  Creatinine 1.4 with BUN of 39 and GFR low at 39.  Potassium 3.7    1.  Add Jardiance 10mg daily  2.  Decrease torsemide to 40 mg daily (from 60 mg daily) when starting Jardiance  3.  Check BMP in 2 weeks following addition of Jardiance  4.  Follow-up in CHF clinic on 7/7/2025 as scheduled or sooner if needed

## 2025-06-28 ENCOUNTER — HOSPITAL ENCOUNTER (OUTPATIENT)
Dept: SLEEP CENTER | Age: 80
Discharge: HOME | End: 2025-06-28
Payer: MEDICARE

## 2025-06-28 DIAGNOSIS — G47.30 SLEEP APNEA, UNSPECIFIED TYPE: ICD-10-CM

## 2025-06-28 DIAGNOSIS — J96.02 ACUTE HYPERCAPNIC RESPIRATORY FAILURE (HCC): ICD-10-CM

## 2025-06-28 PROCEDURE — 95811 POLYSOM 6/>YRS CPAP 4/> PARM: CPT

## 2025-07-07 ENCOUNTER — HOSPITAL ENCOUNTER (OUTPATIENT)
Dept: OTHER | Age: 80
Setting detail: THERAPIES SERIES
Discharge: HOME | End: 2025-07-07
Payer: MEDICARE

## 2025-07-07 ENCOUNTER — HOSPITAL ENCOUNTER (OUTPATIENT)
Dept: PULMONOLOGY | Age: 80
Discharge: HOME | End: 2025-07-07
Attending: STUDENT IN AN ORGANIZED HEALTH CARE EDUCATION/TRAINING PROGRAM
Payer: MEDICARE

## 2025-07-07 VITALS
BODY MASS INDEX: 42.95 KG/M2 | WEIGHT: 250.2 LBS | OXYGEN SATURATION: 92 % | SYSTOLIC BLOOD PRESSURE: 132 MMHG | RESPIRATION RATE: 16 BRPM | HEART RATE: 80 BPM | DIASTOLIC BLOOD PRESSURE: 75 MMHG

## 2025-07-07 DIAGNOSIS — J96.02 ACUTE HYPERCAPNIC RESPIRATORY FAILURE (HCC): ICD-10-CM

## 2025-07-07 LAB
ANION GAP SERPL CALCULATED.3IONS-SCNC: 12 MMOL/L (ref 7–16)
BNP SERPL-MCNC: 1753 PG/ML (ref 0–450)
BUN SERPL-MCNC: 42 MG/DL (ref 8–23)
CALCIUM SERPL-MCNC: 10 MG/DL (ref 8.8–10.2)
CHLORIDE SERPL-SCNC: 99 MMOL/L (ref 98–107)
CO2 SERPL-SCNC: 32 MMOL/L (ref 22–29)
CREAT SERPL-MCNC: 1.6 MG/DL (ref 0.5–1)
GFR, ESTIMATED: 32 ML/MIN/1.73M2
GLUCOSE SERPL-MCNC: 114 MG/DL (ref 74–99)
POTASSIUM SERPL-SCNC: 4.1 MMOL/L (ref 3.5–5.1)
SODIUM SERPL-SCNC: 143 MMOL/L (ref 136–145)

## 2025-07-07 PROCEDURE — 94060 EVALUATION OF WHEEZING: CPT

## 2025-07-07 PROCEDURE — 36415 COLL VENOUS BLD VENIPUNCTURE: CPT

## 2025-07-07 PROCEDURE — 80048 BASIC METABOLIC PNL TOTAL CA: CPT

## 2025-07-07 PROCEDURE — 94726 PLETHYSMOGRAPHY LUNG VOLUMES: CPT

## 2025-07-07 PROCEDURE — 94729 DIFFUSING CAPACITY: CPT

## 2025-07-07 PROCEDURE — 99214 OFFICE O/P EST MOD 30 MIN: CPT

## 2025-07-07 PROCEDURE — 83880 ASSAY OF NATRIURETIC PEPTIDE: CPT

## 2025-07-07 ASSESSMENT — PATIENT HEALTH QUESTIONNAIRE - PHQ9
SUM OF ALL RESPONSES TO PHQ QUESTIONS 1-9: 0
SUM OF ALL RESPONSES TO PHQ QUESTIONS 1-9: 0
1. LITTLE INTEREST OR PLEASURE IN DOING THINGS: NOT AT ALL
SUM OF ALL RESPONSES TO PHQ QUESTIONS 1-9: 0
SUM OF ALL RESPONSES TO PHQ QUESTIONS 1-9: 0
2. FEELING DOWN, DEPRESSED OR HOPELESS: NOT AT ALL

## 2025-07-07 NOTE — PROGRESS NOTES
Congestive Heart Failure Clinic   [facility]      Referring Provider:   Primary Care Physician: Fuentes Herrera MD   Cardiologist: Dr. Ceja   Nephrologist: Nino      HISTORY OF PRESENT ILLNESS:    Aggie Carter is a 79 y.o. (1945) female with a history of HFpEF (EF> 50%)  Pre Cupid:     Lab Results   Component Value Date    LVEF 50 02/26/2025     Post Cupid:    Lab Results   Component Value Date    EFBP 61 02/20/2025     She presents to the CHF clinic for ongoing evaluation and monitoring of heart failure.  In the CHF clinic today she denies any adverse symptoms except:  [] Dizziness or lightheadedness   [] Syncope or near syncope  [] Recent Fall  [] Shortness of breath at rest   [] Dyspnea with exertion  [] Decline in functional capacity (unable to perform activities they had previously been able to do)  [] Fatigue   [] Orthopnea  [] PND  [] Nocturnal cough  [] Hemoptysis  [] Chest pain, pressure, or discomfort  [] Palpitations  [] Abdominal distention  [] Abdominal bloating  [] Early satiety  [] Blood in stool   [] Diarrhea  [] Constipation  [] Nausea/Vomiting  [] Decreased urinary response to oral diuretic   [] Scrotal swelling   [] Lower extremity edema    [] Used PRN doses of oral diuretic   [] Weight gain     Wt Readings from Last 3 Encounters:   07/07/25 113.5 kg (250 lb 3.2 oz)   05/23/25 113.1 kg (249 lb 6.4 oz)   04/30/25 115.4 kg (254 lb 6.4 oz)       SOCIAL HISTORY:  [x] Denies tobacco, alcohol or illicit drug abuse  [] Tobacco use:  [] ETOH use:  [] Illicit drug use:        MEDICATIONS:    Allergies   Allergen Reactions    Dust Mite Extract Other (See Comments)    Grass Pollen(K-O-R-T-Swt Esa) Other (See Comments)    Prednisone Swelling    Keflex [Cephalexin] Rash     Prior to Visit Medications    Medication Sig Taking? Authorizing Provider   empagliflozin (JARDIANCE) 10 MG tablet Take 1 tablet by mouth daily Yes Yas Fatima

## 2025-07-10 NOTE — PROCEDURES
[place] [address]                           PULMONARY FUNCTION      PATIENT NAME: REINALDO GARCIA              : 1945  MED REC NO: [id number]                        ROOM:   ACCOUNT NO: [id number]                       ADMIT DATE: 2025  PROVIDER: Petey Thibodeaux      DATE OF PROCEDURE: 07/10/2025    SURGEON:  Petey Thibodeaux    REFERRING PHYSICIAN:  Petey Thibodeaux    DATA:  Spirometry demonstrates an FVC of 1.08 L, which is 43% of predicted with an FEV1 of 0.70 L, which is 37% of predicted.  FEV1/FVC ratio is 66%.  Mid expiratory flow rates are 23% of predicted.  Maximum voluntary ventilation is 25.88 L/minute, which is 32% of predicted.  Total lung capacity is 3.67 L, which is 72% of predicted.  DLCO is 12.08 mL/minute per mmHg, which is 66% of predicted.  Flow volume loop shows no signs of intrathoracic or extrathoracic obstruction.    PFT INTERPRETATION:    1. No obstruction noted.  2. Restriction suggested by decreased FVC, FEV1, and TLC.  3. Significant bronchodilator response is noted in flows, notably FVC and FEV1.  4. Decreased pulmonary reserve as evident by the reduced MVV.  5. Increased RV/TLC ratio suggest air trapping/hyperinflation.  6. Moderate diffusion impairment which corrects for alveolar volumes.          PETEY THIBODEAUX      D:  07/10/2025 12:24:53     T:  07/10/2025 12:38:51     JANAK/JEFFREY  Job #:  [id number]     Doc#:  [id number]     
Opt out

## 2025-07-18 ENCOUNTER — OFFICE VISIT (OUTPATIENT)
Dept: CARDIOLOGY CLINIC | Age: 80
End: 2025-07-18
Payer: MEDICARE

## 2025-07-18 VITALS
BODY MASS INDEX: 42.34 KG/M2 | TEMPERATURE: 96.8 F | SYSTOLIC BLOOD PRESSURE: 132 MMHG | HEART RATE: 76 BPM | WEIGHT: 248 LBS | HEIGHT: 64 IN | DIASTOLIC BLOOD PRESSURE: 74 MMHG | RESPIRATION RATE: 18 BRPM

## 2025-07-18 DIAGNOSIS — G47.33 OSA (OBSTRUCTIVE SLEEP APNEA): ICD-10-CM

## 2025-07-18 DIAGNOSIS — I10 PRIMARY HYPERTENSION: Primary | ICD-10-CM

## 2025-07-18 DIAGNOSIS — I48.19 PERSISTENT ATRIAL FIBRILLATION (HCC): ICD-10-CM

## 2025-07-18 DIAGNOSIS — I35.0 NONRHEUMATIC AORTIC VALVE STENOSIS: ICD-10-CM

## 2025-07-18 DIAGNOSIS — I50.33 ACUTE ON CHRONIC HEART FAILURE WITH PRESERVED EJECTION FRACTION (HFPEF) (HCC): ICD-10-CM

## 2025-07-18 PROCEDURE — 93000 ELECTROCARDIOGRAM COMPLETE: CPT | Performed by: INTERNAL MEDICINE

## 2025-07-18 PROCEDURE — 1159F MED LIST DOCD IN RCRD: CPT | Performed by: INTERNAL MEDICINE

## 2025-07-18 PROCEDURE — 99214 OFFICE O/P EST MOD 30 MIN: CPT | Performed by: INTERNAL MEDICINE

## 2025-07-18 PROCEDURE — 3075F SYST BP GE 130 - 139MM HG: CPT | Performed by: INTERNAL MEDICINE

## 2025-07-18 PROCEDURE — 3078F DIAST BP <80 MM HG: CPT | Performed by: INTERNAL MEDICINE

## 2025-07-18 PROCEDURE — 1123F ACP DISCUSS/DSCN MKR DOCD: CPT | Performed by: INTERNAL MEDICINE

## 2025-07-18 NOTE — PROGRESS NOTES
OFFICE FOLLOW-UP    Name: Aggie Carter     Age: 79 y.o.    Date of Service: 7/18/2025    Chief Complaint: Follow-up for HFpEF, aortic stenosis, AF    Referring Physician: Fuentes Herrera MD    Interim History:   Chronic AUSTIN with mild levels of exertion (\"stable\" per patient). ++LE edema / history of LE lymphedema. She ambulates with a walker/wheelchair. Previously on chronic O2 --> off O2 for the past ~ 2 weeks (SPO2 96 on RA today). She denies chest pain, palpitations, PND, orthopnea, or syncope. Currently with no active cardiac complaints at rest. Rate controlled AF on EKG. She currently resides at [facility] (being discharged to home soon per patient).    Review of Systems:   Cardiac: As per HPI  General: No fever, chills  Pulmonary: As per HPI  HEENT: No visual disturbances, difficult swallowing  GI: No nausea, vomiting  : No dysuria, hematuria  Endocrine: +thyroid disease, +DM  Musculoskeletal: LAND x 4, no focal motor deficits  Skin: Intact, no rashes  Neuro: No headache, seizures  Psych: Currently with no depression, anxiety    Past Medical History:  Past Medical History:   Diagnosis Date    Atrial fibrillation (HCC)     Dyspnea on exertion     Family history of heart disease     Hyperlipidemia     Hypertension     Mild aortic stenosis 05/10/2013       Past Surgical History:  Past Surgical History:   Procedure Laterality Date    TUBAL LIGATION         Family History:  Family History   Problem Relation Age of Onset    Heart Attack Brother        Social History:  Social History     Socioeconomic History    Marital status:      Spouse name: Not on file    Number of children: Not on file    Years of education: Not on file    Highest education level: Not on file   Occupational History    Not on file   Tobacco Use    Smoking status: Former     Current packs/day: 0.00     Average packs/day: 0.3 packs/day for 1 year (0.3 ttl pk-yrs)     Types: Cigarettes     Start date:

## 2025-07-23 LAB
25(OH)D3 SERPL-MCNC: 88.2 NG/ML (ref 30–100)
ALBUMIN SERPL-MCNC: 4.1 G/DL (ref 3.5–5.2)
ALP SERPL-CCNC: 85 U/L (ref 35–104)
ALT SERPL-CCNC: 9 U/L (ref 0–35)
ANION GAP SERPL CALCULATED.3IONS-SCNC: 17 MMOL/L (ref 7–16)
AST SERPL-CCNC: 26 U/L (ref 0–35)
BASOPHILS # BLD: 0.07 K/UL (ref 0–0.2)
BASOPHILS NFR BLD: 1 % (ref 0–2)
BILIRUB SERPL-MCNC: 0.5 MG/DL (ref 0–1.2)
BNP SERPL-MCNC: 1386 PG/ML (ref 0–450)
BUN SERPL-MCNC: 43 MG/DL (ref 8–23)
CALCIUM SERPL-MCNC: 10 MG/DL (ref 8.8–10.2)
CHLORIDE SERPL-SCNC: 97 MMOL/L (ref 98–107)
CHOLEST SERPL-MCNC: 201 MG/DL
CO2 SERPL-SCNC: 27 MMOL/L (ref 22–29)
CREAT SERPL-MCNC: 1.9 MG/DL (ref 0.5–1)
EOSINOPHIL # BLD: 0.26 K/UL (ref 0.05–0.5)
EOSINOPHILS RELATIVE PERCENT: 4 % (ref 0–6)
ERYTHROCYTE [DISTWIDTH] IN BLOOD BY AUTOMATED COUNT: 14.8 % (ref 11.5–15)
FOLATE SERPL-MCNC: 10.7 NG/ML (ref 4.6–34.8)
GFR, ESTIMATED: 27 ML/MIN/1.73M2
GLUCOSE SERPL-MCNC: 87 MG/DL (ref 74–99)
HBA1C MFR BLD: 5.8 % (ref 4–5.6)
HCT VFR BLD AUTO: 47.7 % (ref 34–48)
HDLC SERPL-MCNC: 40 MG/DL
HGB BLD-MCNC: 15 G/DL (ref 11.5–15.5)
IMM GRANULOCYTES # BLD AUTO: <0.03 K/UL (ref 0–0.58)
IMM GRANULOCYTES NFR BLD: 0 % (ref 0–5)
LDLC SERPL CALC-MCNC: 134 MG/DL
LYMPHOCYTES NFR BLD: 1.11 K/UL (ref 1.5–4)
LYMPHOCYTES RELATIVE PERCENT: 16 % (ref 20–42)
MAGNESIUM SERPL-MCNC: 2.4 MG/DL (ref 1.6–2.4)
MCH RBC QN AUTO: 26.7 PG (ref 26–35)
MCHC RBC AUTO-ENTMCNC: 31.4 G/DL (ref 32–34.5)
MCV RBC AUTO: 85 FL (ref 80–99.9)
MONOCYTES NFR BLD: 0.61 K/UL (ref 0.1–0.95)
MONOCYTES NFR BLD: 9 % (ref 2–12)
NEUTROPHILS NFR BLD: 71 % (ref 43–80)
NEUTS SEG NFR BLD: 4.98 K/UL (ref 1.8–7.3)
PHOSPHATE SERPL-MCNC: 4.1 MG/DL (ref 2.5–4.5)
PLATELET CONFIRMATION: NORMAL
PLATELET, FLUORESCENCE: 157 K/UL (ref 130–450)
PMV BLD AUTO: 12.7 FL (ref 7–12)
POTASSIUM SERPL-SCNC: 4.1 MMOL/L (ref 3.5–5.1)
PROT SERPL-MCNC: 7.6 G/DL (ref 6.4–8.3)
PTH-INTACT SERPL-MCNC: 59 PG/ML (ref 15–65)
RBC # BLD AUTO: 5.61 M/UL (ref 3.5–5.5)
SODIUM SERPL-SCNC: 141 MMOL/L (ref 136–145)
T4 SERPL-MCNC: 9.3 UG/DL (ref 4.5–11.7)
TRIGL SERPL-MCNC: 135 MG/DL
TSH SERPL DL<=0.05 MIU/L-ACNC: 0.26 UIU/ML (ref 0.27–4.2)
VIT B12 SERPL-MCNC: 520 PG/ML (ref 232–1245)
VLDLC SERPL CALC-MCNC: 27 MG/DL
WBC OTHER # BLD: 7.1 K/UL (ref 4.5–11.5)

## 2025-08-18 ENCOUNTER — HOSPITAL ENCOUNTER (OUTPATIENT)
Dept: OTHER | Age: 80
Setting detail: THERAPIES SERIES
Discharge: HOME | End: 2025-08-18
Payer: MEDICARE

## 2025-08-18 VITALS
WEIGHT: 240.8 LBS | RESPIRATION RATE: 16 BRPM | HEART RATE: 70 BPM | DIASTOLIC BLOOD PRESSURE: 61 MMHG | OXYGEN SATURATION: 92 % | BODY MASS INDEX: 41.33 KG/M2 | SYSTOLIC BLOOD PRESSURE: 121 MMHG

## 2025-08-18 LAB
ANION GAP SERPL CALCULATED.3IONS-SCNC: 12 MMOL/L (ref 7–16)
BNP SERPL-MCNC: 880 PG/ML (ref 0–450)
BUN SERPL-MCNC: 34 MG/DL (ref 8–23)
CALCIUM SERPL-MCNC: 9.9 MG/DL (ref 8.8–10.2)
CHLORIDE SERPL-SCNC: 100 MMOL/L (ref 98–107)
CO2 SERPL-SCNC: 29 MMOL/L (ref 22–29)
CREAT SERPL-MCNC: 1.8 MG/DL (ref 0.5–1)
GFR, ESTIMATED: 27 ML/MIN/1.73M2
GLUCOSE SERPL-MCNC: 97 MG/DL (ref 74–99)
POTASSIUM SERPL-SCNC: 4.5 MMOL/L (ref 3.5–5.1)
SODIUM SERPL-SCNC: 141 MMOL/L (ref 136–145)

## 2025-08-18 PROCEDURE — 36415 COLL VENOUS BLD VENIPUNCTURE: CPT

## 2025-08-18 PROCEDURE — 99214 OFFICE O/P EST MOD 30 MIN: CPT

## 2025-08-18 PROCEDURE — 80048 BASIC METABOLIC PNL TOTAL CA: CPT

## 2025-08-18 PROCEDURE — 83880 ASSAY OF NATRIURETIC PEPTIDE: CPT
